# Patient Record
Sex: FEMALE | Race: WHITE | NOT HISPANIC OR LATINO | Employment: FULL TIME | URBAN - METROPOLITAN AREA
[De-identification: names, ages, dates, MRNs, and addresses within clinical notes are randomized per-mention and may not be internally consistent; named-entity substitution may affect disease eponyms.]

---

## 2022-01-10 ENCOUNTER — TELEPHONE (OUTPATIENT)
Dept: OBGYN | Facility: CLINIC | Age: 32
End: 2022-01-10
Payer: COMMERCIAL

## 2022-01-10 DIAGNOSIS — Z32.01 PREGNANCY TEST POSITIVE: Primary | ICD-10-CM

## 2022-01-10 NOTE — TELEPHONE ENCOUNTER
[facility] Call Center    Phone Message    May a detailed message be left on voicemail: yes     Reason for Call: Other: Pt scheduled first OB appt for 1/31/22.  She is wondering if she can continue to take a daily baby aspirin.  Please call Rosemarie to discuss     Action Taken: Message routed to:  [facility] day    Travel Screening: Not Applicable

## 2022-01-10 NOTE — TELEPHONE ENCOUNTER
Called and spoke with patient. States she is not currently taking aspirin, however, was prescribed in a previous pregnancy.    Spoke with PABLO Tejeda CNM. States if indicated, aspirin is started at 12 weeks.     Advised patient to not start taking aspirin at this time, will be evaluated at first visit and will be started at 12 weeks if indicated. Pt verbalized understanding and agreement, denies further questions/concerns.

## 2022-01-10 NOTE — TELEPHONE ENCOUNTER
[facility] Call Center    Phone Message    May a detailed message be left on voicemail: yes     Reason for Call: Order(s): Other:   Reason for requested: US new OBI  Date needed: 1/31/22  Provider name: Latasha Carias      Action Taken: Message routed to:  [facility] s    Travel Screening: Not Applicable

## 2022-01-31 ENCOUNTER — LAB (OUTPATIENT)
Dept: LAB | Facility: CLINIC | Age: 32
End: 2022-01-31
Attending: REGISTERED NURSE
Payer: COMMERCIAL

## 2022-01-31 ENCOUNTER — TRANSCRIBE ORDERS (OUTPATIENT)
Dept: MATERNAL FETAL MEDICINE | Facility: CLINIC | Age: 32
End: 2022-01-31
Payer: COMMERCIAL

## 2022-01-31 ENCOUNTER — ANCILLARY PROCEDURE (OUTPATIENT)
Dept: ULTRASOUND IMAGING | Facility: CLINIC | Age: 32
End: 2022-01-31
Attending: REGISTERED NURSE
Payer: COMMERCIAL

## 2022-01-31 ENCOUNTER — OFFICE VISIT (OUTPATIENT)
Dept: OBGYN | Facility: CLINIC | Age: 32
End: 2022-01-31
Attending: REGISTERED NURSE
Payer: COMMERCIAL

## 2022-01-31 VITALS
WEIGHT: 179 LBS | DIASTOLIC BLOOD PRESSURE: 85 MMHG | BODY MASS INDEX: 27.13 KG/M2 | HEIGHT: 68 IN | HEART RATE: 79 BPM | SYSTOLIC BLOOD PRESSURE: 125 MMHG

## 2022-01-31 DIAGNOSIS — Z87.59 HISTORY OF PRIOR PREGNANCY WITH IUGR NEWBORN: ICD-10-CM

## 2022-01-31 DIAGNOSIS — O10.919 CHRONIC HYPERTENSION AFFECTING PREGNANCY: ICD-10-CM

## 2022-01-31 DIAGNOSIS — Z92.29 COVID-19 VACCINE SERIES COMPLETED: ICD-10-CM

## 2022-01-31 DIAGNOSIS — O09.299 H/O POSTPARTUM HEMORRHAGE, CURRENTLY PREGNANT: ICD-10-CM

## 2022-01-31 DIAGNOSIS — Z32.01 PREGNANCY TEST POSITIVE: ICD-10-CM

## 2022-01-31 DIAGNOSIS — O09.90 HIGH-RISK PREGNANCY, UNSPECIFIED TRIMESTER: Primary | ICD-10-CM

## 2022-01-31 DIAGNOSIS — O09.299 HISTORY OF PLACENTA ACCRETA IN PRIOR PREGNANCY, CURRENTLY PREGNANT: ICD-10-CM

## 2022-01-31 DIAGNOSIS — O09.90 HIGH-RISK PREGNANCY, UNSPECIFIED TRIMESTER: ICD-10-CM

## 2022-01-31 DIAGNOSIS — O26.90 PREGNANCY RELATED CONDITION, ANTEPARTUM: Primary | ICD-10-CM

## 2022-01-31 PROBLEM — O43.219 PLACENTA ACCRETA: Status: RESOLVED | Noted: 2020-04-16 | Resolved: 2022-01-31

## 2022-01-31 PROBLEM — O43.219 PLACENTA ACCRETA: Status: ACTIVE | Noted: 2020-04-16

## 2022-01-31 LAB
ABO/RH(D): NORMAL
ANTIBODY SCREEN: NEGATIVE
DEPRECATED CALCIDIOL+CALCIFEROL SERPL-MC: 31 UG/L (ref 20–75)
ERYTHROCYTE [DISTWIDTH] IN BLOOD BY AUTOMATED COUNT: 12.9 % (ref 10–15)
HBV SURFACE AB SERPL IA-ACNC: 126 M[IU]/ML
HBV SURFACE AG SERPL QL IA: NONREACTIVE
HCT VFR BLD AUTO: 40.3 % (ref 35–47)
HCV AB SERPL QL IA: NONREACTIVE
HGB BLD-MCNC: 13.4 G/DL (ref 11.7–15.7)
HIV 1+2 AB+HIV1 P24 AG SERPL QL IA: NONREACTIVE
MCH RBC QN AUTO: 28.2 PG (ref 26.5–33)
MCHC RBC AUTO-ENTMCNC: 33.3 G/DL (ref 31.5–36.5)
MCV RBC AUTO: 85 FL (ref 78–100)
PLATELET # BLD AUTO: 225 10E3/UL (ref 150–450)
RBC # BLD AUTO: 4.76 10E6/UL (ref 3.8–5.2)
SPECIMEN EXPIRATION DATE: NORMAL
T PALLIDUM AB SER QL: NONREACTIVE
WBC # BLD AUTO: 11.3 10E3/UL (ref 4–11)

## 2022-01-31 PROCEDURE — 99207 PR PRENATAL VISIT: CPT | Performed by: REGISTERED NURSE

## 2022-01-31 PROCEDURE — 86762 RUBELLA ANTIBODY: CPT

## 2022-01-31 PROCEDURE — 86706 HEP B SURFACE ANTIBODY: CPT

## 2022-01-31 PROCEDURE — 86901 BLOOD TYPING SEROLOGIC RH(D): CPT | Performed by: REGISTERED NURSE

## 2022-01-31 PROCEDURE — 87086 URINE CULTURE/COLONY COUNT: CPT | Performed by: REGISTERED NURSE

## 2022-01-31 PROCEDURE — 87340 HEPATITIS B SURFACE AG IA: CPT

## 2022-01-31 PROCEDURE — 36415 COLL VENOUS BLD VENIPUNCTURE: CPT

## 2022-01-31 PROCEDURE — 76817 TRANSVAGINAL US OBSTETRIC: CPT

## 2022-01-31 PROCEDURE — 87389 HIV-1 AG W/HIV-1&-2 AB AG IA: CPT

## 2022-01-31 PROCEDURE — 86780 TREPONEMA PALLIDUM: CPT

## 2022-01-31 PROCEDURE — 86803 HEPATITIS C AB TEST: CPT

## 2022-01-31 PROCEDURE — 85027 COMPLETE CBC AUTOMATED: CPT

## 2022-01-31 PROCEDURE — 82306 VITAMIN D 25 HYDROXY: CPT

## 2022-01-31 PROCEDURE — G0463 HOSPITAL OUTPT CLINIC VISIT: HCPCS

## 2022-01-31 PROCEDURE — 86787 VARICELLA-ZOSTER ANTIBODY: CPT

## 2022-01-31 PROCEDURE — 76817 TRANSVAGINAL US OBSTETRIC: CPT | Mod: 26 | Performed by: OBSTETRICS & GYNECOLOGY

## 2022-01-31 RX ORDER — PRENATAL VIT/IRON FUM/FOLIC AC 27MG-0.8MG
1 TABLET ORAL DAILY
COMMUNITY
End: 2023-12-15

## 2022-01-31 RX ORDER — LOSARTAN POTASSIUM 50 MG/1
1 TABLET ORAL DAILY
COMMUNITY
Start: 2021-10-06 | End: 2022-01-31

## 2022-01-31 RX ORDER — CALCIUM CARBONATE 750 MG/1
2 TABLET, CHEWABLE ORAL
COMMUNITY
End: 2022-11-18

## 2022-01-31 RX ORDER — NIFEDIPINE 30 MG/1
30 TABLET, EXTENDED RELEASE ORAL
COMMUNITY
Start: 2021-08-18 | End: 2022-05-10

## 2022-01-31 ASSESSMENT — MIFFLIN-ST. JEOR: SCORE: 1562.5

## 2022-01-31 ASSESSMENT — PAIN SCALES - GENERAL: PAINLEVEL: NO PAIN (0)

## 2022-01-31 NOTE — LETTER
"2022       RE: Rosemarie Johnson  [address] [place] [address]     Dear Colleague,    Thank you for referring your patient, Rosemarie Johnson, to the [facility] [place] at [facility]. Please see a copy of my visit note below.    S OB Intake note  Subjective   32 year old female presents to clinic for initiation of OB care. Patient's last menstrual period was 2021.  at 7w2d by Estimated Date of Delivery: Sep 17, 2022 based on LMP c/w US. Reviewed dating ultrasound. Pregnancy is unplanned but welcomed.    Partner name - , Shun.     #1 C/S 3/28/2020 @ 30+2 weeks Male \"Mroro\"-- early delivery d/t IUGR (2%ile) w/ intermittent reverse doppler flow and vasa previa w/ focal accreta. Pregnancy was also complicated by chronic hypertension (upon arrival to L&D for delivery was found to have sustained severe range Bps: required nifedipine short and long acting and received 12h magnesium for neuro protection prior to delivery), IUGR, and 2 vessel cord. Morro is doing very well now. On the growth charts and catching up! Delivery was at [facility].       Symptoms since LMP include nausea and fatigue. Patient has tried these relief measures: small frequent meals and increased rest. Tums prn for acid reflux.     - Genetic/Infection questionnaire completed, risks include hx varicella, Japanese heritage, aunt and cousins with Factor V Leiden (she and her mother have been tested and are negative). Pt  does not have a recent known exposure to Parvo or CMV so IgG/IgM testing WILL NOT be ordered.   Recommended Flu Vaccine.  Flu Vaccine Given   Fully vaccinated against COVID-19 + booster     - Current Medications    Current Outpatient Medications   Medication Sig Dispense Refill     calcium carbonate 750 MG CHEW Take 2 tablets by mouth       NIFEdipine ER OSMOTIC (PROCARDIA XL) 30 MG 24 hr tablet Take 30 mg by mouth       Prenatal " Vit-Fe Fumarate-FA (PRENATAL MULTIVITAMIN W/IRON) 27-0.8 MG tablet Take 1 tablet by mouth daily           - Co-morbids:   Past Medical History:   Diagnosis Date     Anemia of mother during pregnancy, delivered 3/28/2020    Formatting of this note might be different from the original. Pitx3, cytotec, hemabate, txax2 Hb 10.1 (from 13.6) Hemorrhage 1760 total      delivery delivered 3/27/2020    Last Assessment & Plan:  Formatting of this note might be different from the original. Rosemarie Johnson is a 30 year old  who was admitted at 30w1d with pregnancy complicated by chronic hypertension, vasa previa, single umbilical artery, and IUGR, 2%ile. She was admitted on 3/27 from a scheduled ultrasound for prolonged monitoring due to decelerations in the heart rate and intermitted reversed do     Chronic hypertension      Delayed or secondary postpartum hemorrhage 3/28/2020    Formatting of this note might be different from the original. 1003cc after expressed clots. cytotec 800, oxytocinx3     High-risk pregnancy, unspecified trimester 2022     PCOS (polycystic ovarian syndrome)     dx age 12     Placenta accreta 2020    Formatting of this note might be different from the original. Pathology after delivery shows focal placenta accreta.  Patient currently asymptomatic     Vasa previa 2020     - Risk for GDM : No known risk factors for GDMso  WILL NOT have an early GCT and Hgb A1C    - High risk factors for Pre E-  Chronic hypertension       - Moderate risk factor for Pre E No moderate risk factors  Meets one high risk factors or one of the moderate risk facrtors  so WILL consider starting low dose aspirin (81mg) starting between 12 and 28 weeks to prevent early onset preeclampsia    - The patient  does not have a history of spontaneous  birth so  WILL NOT consider progesterone starting at 16-20 weeks and/or serial transvaginal cervical length ultrasounds from 16-24 weeks.     -The patient does  not have a history of immunosuppresion or HIV so Toxoplasma IgG/IgM WILL NOT be ordered.    -Assess risk for asymptomatic latent TB (prior infection, recent immigrant from epidemic areas, immunosuppression, living in overcrowded environment):   WILL NOT have PPD skin test or Quantiferon-TB Gold Plus blood draw. *both options valid*       PERSONAL/SOCIAL HISTORY    with partner and almost 1yo  Employment: Full time in marketing.  Job involves light activity. Working from home right now but will move to [employer] in a few weeks. 5 days/week of working out.   Her partner works as a marketing for [employer].   History of anxiety or depression  No   Additional items: None    Objective  -VS: reviewed and within normal limits   -General appearance: no acute distress, patient is comfortable   NEUROLOGICAL/PSYCHIATRIC   - Orientated x3,   -Mood and affect: : normal     Assessment/Plan  Rsoemarie was seen today for prenatal care.    Diagnoses and all orders for this visit:    High-risk pregnancy, unspecified trimester  -     Hepatitis B Surface Antibody; Future  -     Rubella Antibody IgG; Future  -     Varicella Zoster Virus Antibody IgG; Future  -     Urine Culture  -     Hepatitis C antibody; Future  -     ABO/Rh type and screen  -     CBC with platelets; Future  -     Hepatitis B surface antigen; Future  -     HIV Antigen Antibody Combo; Future  -     Treponema Abs w Reflex to RPR and Titer; Future  -     25- OH-Vitamin D; Future  -     Mat Fetal Med Ctr Referral - Pregnancy; Future    Chronic hypertension affecting pregnancy    History of placenta accreta in prior pregnancy, currently pregnant    Vasa previa, single or unspecified fetus    H/O postpartum hemorrhage, currently pregnant    COVID-19 vaccine series completed    History of prior pregnancy with IUGR         32 year old  7w2d weeks of pregnancy with CRISTHIAN of Sep 17, 2022 by LMP of Patient's last menstrual period was 2021.. Ultrasound  confirms.   Outpatient Encounter Medications as of 1/31/2022   Medication Sig Dispense Refill     calcium carbonate 750 MG CHEW Take 2 tablets by mouth       NIFEdipine ER OSMOTIC (PROCARDIA XL) 30 MG 24 hr tablet Take 30 mg by mouth       Prenatal Vit-Fe Fumarate-FA (PRENATAL MULTIVITAMIN W/IRON) 27-0.8 MG tablet Take 1 tablet by mouth daily       [DISCONTINUED] losartan (COZAAR) 50 MG tablet Take 1 tablet by mouth daily (Patient not taking: Reported on 1/31/2022)       No facility-administered encounter medications on file as of 1/31/2022.      Orders Placed This Encounter   Procedures     Hepatitis B Surface Antibody     Rubella Antibody IgG     Varicella Zoster Virus Antibody IgG     Hepatitis C antibody     CBC with platelets     Hepatitis B surface antigen     HIV Antigen Antibody Combo     Treponema Abs w Reflex to RPR and Titer     25- OH-Vitamin D     Mat Fetal Med Ctr Referral - Pregnancy     Adult Type and Screen       Orders Placed This Encounter   Procedures     Hepatitis B Surface Antibody     Rubella Antibody IgG     Varicella Zoster Virus Antibody IgG     Hepatitis C antibody     CBC with platelets     Hepatitis B surface antigen     HIV Antigen Antibody Combo     Treponema Abs w Reflex to RPR and Titer     25- OH-Vitamin D     Mat Fetal Med Ctr Referral - Pregnancy     Adult Type and Screen     ABO/Rh type and screen       - Oriented to Practice, types of care, and how to reach a provider.  Pt prefers MD team  - Patient received 1st trimester new OB education packet complete with aide of The Expectant Family booklet including information on genetic screening test options.  - Patient desires 1st trimester screening and desires level II ultrasound which were ordered.  - Educational handout on the prevention of infections diseases during pregnancy provided.  - Patient was encouraged to start prenatal vitamins as tolerated.    - Patient was sent to lab for routine OB labs including varicella.   -  Reviewed recommendation for 17P, pt not a candidate due to PTB related to placental abnormality and fetal concern.      - Reviewed risk for diabetes in pregnancy, pt not a candidate for early screening. Plan routine screening 24-28 weeks  - Reviewed recommendation for low dose aspirin daily to prevent pre eclampsia, pt agrees, follow up at NOB visit.   - Pregnancy concerns to be addressed by provider at new OB exam include:     #previous  C/S x1 IUGR w/intermittent reverse doppler flow and vasa previa  #chronic hypertension: planning to start ASA @ 12 weeks     Pt to RTO for NOB visit in 3 weeks and prn if questions or concerns    LASHAY Pedraza CNM            Again, thank you for allowing me to participate in the care of your patient.      Sincerely,    LASHAY Pedraza CNM

## 2022-01-31 NOTE — LETTER
Date:February 1, 2022      Patient was self referred, no letter generated. Do not send.        [facility] Health Information

## 2022-01-31 NOTE — NURSING NOTE
Chief Complaint   Patient presents with     Prenatal Care     OB intake CRISTHIAN 9/17/2022   Charito Donaldson LPN

## 2022-01-31 NOTE — PROGRESS NOTES
"[facility] OB Intake note  Subjective   32 year old female presents to clinic for initiation of OB care. Patient's last menstrual period was 2021.  at 7w2d by Estimated Date of Delivery: Sep 17, 2022 based on LMP c/w US. Reviewed dating ultrasound. Pregnancy is unplanned but welcomed.    Partner name - , Shun.     #1 C/S 3/28/2020 @ 30+2 weeks Male \"Morro\"-- early delivery d/t IUGR (2%ile) w/ intermittent reverse doppler flow and vasa previa w/ focal accreta. Pregnancy was also complicated by chronic hypertension (upon arrival to L&D for delivery was found to have sustained severe range Bps: required nifedipine short and long acting and received 12h magnesium for neuro protection prior to delivery), IUGR, and 2 vessel cord. Morro is doing very well now. On the growth charts and catching up! Delivery was at [facility].       Symptoms since LMP include nausea and fatigue. Patient has tried these relief measures: small frequent meals and increased rest. Tums prn for acid reflux.     - Genetic/Infection questionnaire completed, risks include hx varicella, Bulgarian heritage, aunt and cousins with Factor V Leiden (she and her mother have been tested and are negative). Pt  does not have a recent known exposure to Parvo or CMV so IgG/IgM testing WILL NOT be ordered.   Recommended Flu Vaccine.  Flu Vaccine Given   Fully vaccinated against COVID-19 + booster     - Current Medications    Current Outpatient Medications   Medication Sig Dispense Refill     calcium carbonate 750 MG CHEW Take 2 tablets by mouth       NIFEdipine ER OSMOTIC (PROCARDIA XL) 30 MG 24 hr tablet Take 30 mg by mouth       Prenatal Vit-Fe Fumarate-FA (PRENATAL MULTIVITAMIN W/IRON) 27-0.8 MG tablet Take 1 tablet by mouth daily           - Co-morbids:   Past Medical History:   Diagnosis Date     Anemia of mother during pregnancy, delivered 3/28/2020    Formatting of this note might be different from the original. Pitx3, cytotec, hemabate, txax2 " Hb 10.1 (from 13.6) Hemorrhage 1760 total      delivery delivered 3/27/2020    Last Assessment & Plan:  Formatting of this note might be different from the original. Rosemarie Johnson is a 30 year old  who was admitted at 30w1d with pregnancy complicated by chronic hypertension, vasa previa, single umbilical artery, and IUGR, 2%ile. She was admitted on 3/27 from a scheduled ultrasound for prolonged monitoring due to decelerations in the heart rate and intermitted reversed do     Chronic hypertension      Delayed or secondary postpartum hemorrhage 3/28/2020    Formatting of this note might be different from the original. 1003cc after expressed clots. cytotec 800, oxytocinx3     High-risk pregnancy, unspecified trimester 2022     PCOS (polycystic ovarian syndrome)     dx age 12     Placenta accreta 2020    Formatting of this note might be different from the original. Pathology after delivery shows focal placenta accreta.  Patient currently asymptomatic     Vasa previa 2020     - Risk for GDM : No known risk factors for GDMso  WILL NOT have an early GCT and Hgb A1C    - High risk factors for Pre E-  Chronic hypertension       - Moderate risk factor for Pre E No moderate risk factors  Meets one high risk factors or one of the moderate risk facrtors  so WILL consider starting low dose aspirin (81mg) starting between 12 and 28 weeks to prevent early onset preeclampsia    - The patient  does not have a history of spontaneous  birth so  WILL NOT consider progesterone starting at 16-20 weeks and/or serial transvaginal cervical length ultrasounds from 16-24 weeks.     -The patient does not have a history of immunosuppresion or HIV so Toxoplasma IgG/IgM WILL NOT be ordered.    -Assess risk for asymptomatic latent TB (prior infection, recent immigrant from epidemic areas, immunosuppression, living in overcrowded environment):   WILL NOT have PPD skin test or Quantiferon-TB Gold Plus blood draw.  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 5 days/week of working out.   Her partner works as a marketing for [employer].   History of anxiety or depression  No   Additional items: None    Objective  -VS: reviewed and within normal limits   -General appearance: no acute distress, patient is comfortable   NEUROLOGICAL/PSYCHIATRIC   - Orientated x3,   -Mood and affect: : normal     Assessment/Plan  Rosemarie was seen today for prenatal care.    Diagnoses and all orders for this visit:    High-risk pregnancy, unspecified trimester  -     Hepatitis B Surface Antibody; Future  -     Rubella Antibody IgG; Future  -     Varicella Zoster Virus Antibody IgG; Future  -     Urine Culture  -     Hepatitis C antibody; Future  -     ABO/Rh type and screen  -     CBC with platelets; Future  -     Hepatitis B surface antigen; Future  -     HIV Antigen Antibody Combo; Future  -     Treponema Abs w Reflex to RPR and Titer; Future  -     25- OH-Vitamin D; Future  -     Mat Fetal Med Ctr Referral - Pregnancy; Future    Chronic hypertension affecting pregnancy    History of placenta accreta in prior pregnancy, currently pregnant    Vasa previa, single or unspecified fetus    H/O postpartum hemorrhage, currently pregnant    COVID-19 vaccine series completed    History of prior pregnancy with IUGR         32 year old  7w2d weeks of pregnancy with CRISTHIAN of Sep 17, 2022 by LMP of Patient's last menstrual period was 2021.. Ultrasound confirms.   Outpatient Encounter Medications as of 2022   Medication Sig Dispense Refill     calcium carbonate 750 MG CHEW Take 2 tablets by mouth       NIFEdipine ER OSMOTIC (PROCARDIA XL) 30 MG 24 hr tablet Take 30 mg by mouth       Prenatal Vit-Fe Fumarate-FA (PRENATAL MULTIVITAMIN W/IRON) 27-0.8 MG tablet Take  1 tablet by mouth daily       [DISCONTINUED] losartan (COZAAR) 50 MG tablet Take 1 tablet by mouth daily (Patient not taking: Reported on 1/31/2022)       No facility-administered encounter medications on file as of 1/31/2022.      Orders Placed This Encounter   Procedures     Hepatitis B Surface Antibody     Rubella Antibody IgG     Varicella Zoster Virus Antibody IgG     Hepatitis C antibody     CBC with platelets     Hepatitis B surface antigen     HIV Antigen Antibody Combo     Treponema Abs w Reflex to RPR and Titer     25- OH-Vitamin D     Mat Fetal Med Ctr Referral - Pregnancy     Adult Type and Screen       Orders Placed This Encounter   Procedures     Hepatitis B Surface Antibody     Rubella Antibody IgG     Varicella Zoster Virus Antibody IgG     Hepatitis C antibody     CBC with platelets     Hepatitis B surface antigen     HIV Antigen Antibody Combo     Treponema Abs w Reflex to RPR and Titer     25- OH-Vitamin D     Mat Fetal Med Ctr Referral - Pregnancy     Adult Type and Screen     ABO/Rh type and screen       - Oriented to Practice, types of care, and how to reach a provider.  Pt prefers MD team  - Patient received 1st trimester new OB education packet complete with aide of The Expectant Family booklet including information on genetic screening test options.  - Patient desires 1st trimester screening and desires level II ultrasound which were ordered.  - Educational handout on the prevention of infections diseases during pregnancy provided.  - Patient was encouraged to start prenatal vitamins as tolerated.    - Patient was sent to lab for routine OB labs including varicella.   - Reviewed recommendation for 17P, pt not a candidate due to PTB related to placental abnormality and fetal concern.      - Reviewed risk for diabetes in pregnancy, pt not a candidate for early screening. Plan routine screening 24-28 weeks  - Reviewed recommendation for low dose aspirin daily to prevent pre eclampsia, pt  agrees, follow up at NOB visit.   - Pregnancy concerns to be addressed by provider at new OB exam include:     #previous  C/S x1 IUGR w/intermittent reverse doppler flow and vasa previa  #chronic hypertension: planning to start ASA @ 12 weeks     Pt to RTO for NOB visit in 3 weeks and prn if questions or concerns    LASHAY PedrazaM

## 2022-02-01 LAB
BACTERIA UR CULT: NORMAL
RUBV IGG SERPL QL IA: 2.38 INDEX
RUBV IGG SERPL QL IA: POSITIVE
VZV IGG SER QL IA: 1033 INDEX
VZV IGG SER QL IA: POSITIVE

## 2022-02-20 ASSESSMENT — ANXIETY QUESTIONNAIRES
7. FEELING AFRAID AS IF SOMETHING AWFUL MIGHT HAPPEN: NOT AT ALL
4. TROUBLE RELAXING: SEVERAL DAYS
2. NOT BEING ABLE TO STOP OR CONTROL WORRYING: NOT AT ALL
7. FEELING AFRAID AS IF SOMETHING AWFUL MIGHT HAPPEN: NOT AT ALL
5. BEING SO RESTLESS THAT IT IS HARD TO SIT STILL: NOT AT ALL
1. FEELING NERVOUS, ANXIOUS, OR ON EDGE: SEVERAL DAYS
GAD7 TOTAL SCORE: 2
6. BECOMING EASILY ANNOYED OR IRRITABLE: NOT AT ALL
3. WORRYING TOO MUCH ABOUT DIFFERENT THINGS: NOT AT ALL
GAD7 TOTAL SCORE: 2

## 2022-02-21 ENCOUNTER — OFFICE VISIT (OUTPATIENT)
Dept: OBGYN | Facility: CLINIC | Age: 32
End: 2022-02-21
Attending: ADVANCED PRACTICE MIDWIFE
Payer: COMMERCIAL

## 2022-02-21 VITALS
DIASTOLIC BLOOD PRESSURE: 98 MMHG | SYSTOLIC BLOOD PRESSURE: 147 MMHG | HEART RATE: 70 BPM | BODY MASS INDEX: 27.14 KG/M2 | HEIGHT: 68 IN | WEIGHT: 179.1 LBS

## 2022-02-21 DIAGNOSIS — Z87.59 HISTORY OF PRIOR PREGNANCY WITH IUGR NEWBORN: ICD-10-CM

## 2022-02-21 DIAGNOSIS — O09.90 HIGH-RISK PREGNANCY, UNSPECIFIED TRIMESTER: ICD-10-CM

## 2022-02-21 DIAGNOSIS — O10.919 CHRONIC HYPERTENSION AFFECTING PREGNANCY: ICD-10-CM

## 2022-02-21 DIAGNOSIS — Z92.29 COVID-19 VACCINE SERIES COMPLETED: ICD-10-CM

## 2022-02-21 DIAGNOSIS — O09.299 HISTORY OF PLACENTA ACCRETA IN PRIOR PREGNANCY, CURRENTLY PREGNANT: ICD-10-CM

## 2022-02-21 DIAGNOSIS — O09.299 H/O POSTPARTUM HEMORRHAGE, CURRENTLY PREGNANT: ICD-10-CM

## 2022-02-21 PROCEDURE — G0463 HOSPITAL OUTPT CLINIC VISIT: HCPCS

## 2022-02-21 PROCEDURE — 99207 PR PRENATAL VISIT: CPT | Performed by: ADVANCED PRACTICE MIDWIFE

## 2022-02-21 PROCEDURE — 87591 N.GONORRHOEAE DNA AMP PROB: CPT | Performed by: ADVANCED PRACTICE MIDWIFE

## 2022-02-21 PROCEDURE — 87491 CHLMYD TRACH DNA AMP PROBE: CPT | Performed by: ADVANCED PRACTICE MIDWIFE

## 2022-02-21 ASSESSMENT — ANXIETY QUESTIONNAIRES: GAD7 TOTAL SCORE: 2

## 2022-02-21 NOTE — LETTER
2022       RE: Rosemarie Johnson  [address] [place] [address]     Dear Colleague,    Thank you for referring your patient, Rosemarie Johnson, to the [facility] [place] at [facility]. Please see a copy of my visit note below.    SUBJECTIVE:   Rosemarie is a 32 year old female who presents to clinic for a new OB visit.   at 11w1d with Estimated Date of Delivery: Sep 17, 2022 based on US confirms. Feels well. Has  started PNV.     She has not had bleeding since her LMP.   She has had mild nausea. Weight loss has not occurred.   This was a planned pregnancy.   Partner is involved,     OTHER CONCERNS: elevated BPs today. States all nml at home, does endorse getting anxious about coming to clinic, forgot med yesterday but did take this morning  Has follow up appts scheduled with MFM already    ===========================================   ROS: 10 point ROS neg other than the symptoms noted above in the HPI.      PSYCHIATRIC:  Denies mood changes.      PHQ-9 score:  No flowsheet data found.        RITO-7 SCORE 2022   Total Score 2 (minimal anxiety)   Total Score 2         Past History:  Her past medical history   Past Medical History:   Diagnosis Date     Anemia of mother during pregnancy, delivered 3/28/2020    Formatting of this note might be different from the original. Pitx3, cytotec, hemabate, txax2 Hb 10.1 (from 13.6) Hemorrhage 1760 total      delivery delivered 3/27/2020    Last Assessment & Plan:  Formatting of this note might be different from the original. Rosemarie Johnson is a 30 year old  who was admitted at 30w1d with pregnancy complicated by chronic hypertension, vasa previa, single umbilical artery, and IUGR, 2%ile. She was admitted on 3/27 from a scheduled ultrasound for prolonged monitoring due to decelerations in the heart rate and intermitted reversed do     Chronic hypertension      Delayed or  secondary postpartum hemorrhage 3/28/2020    Formatting of this note might be different from the original. 1003cc after expressed clots. cytotec 800, oxytocinx3     High-risk pregnancy, unspecified trimester 1/31/2022     PCOS (polycystic ovarian syndrome)     dx age 12     Placenta accreta 4/16/2020    Formatting of this note might be different from the original. Pathology after delivery shows focal placenta accreta.  Patient currently asymptomatic     Vasa previa 1/30/2020   .   She has a history of  pre-term delivery at 30 weeks  Since her last LMP she denies use of alcohol, tobacco and street drugs.  HISTORY:  Family History   Problem Relation Age of Onset     Hypertension Mother      Hypertension Father      Sleep Apnea Father      Hypertension Maternal Grandmother      Arthritis Maternal Grandmother      Parkinsonism Maternal Grandfather      Heart Disease Maternal Grandfather      Leukemia Maternal Grandfather      Heart Surgery Paternal Grandfather      Social History     Socioeconomic History     Marital status:      Spouse name: Shun     Number of children: 1     Years of education: None     Highest education level: None   Occupational History     Occupation: [employer]-full time   Tobacco Use     Smoking status: Never Smoker     Smokeless tobacco: Never Used   Substance and Sexual Activity     Alcohol use: Not Currently     Drug use: Never     Sexual activity: Yes     Partners: Male     Birth control/protection: None   Other Topics Concern     None   Social History Narrative     None     Social Determinants of Health     Financial Resource Strain: Not on file   Food Insecurity: Not on file   Transportation Needs: Not on file   Physical Activity: Not on file   Stress: Not on file   Social Connections: Not on file   Intimate Partner Violence: Not on file   Housing Stability: Not on file     Current Outpatient Medications   Medication Sig     calcium carbonate 750 MG CHEW Take 2 tablets by mouth      NIFEdipine ER OSMOTIC (PROCARDIA XL) 30 MG 24 hr tablet Take 30 mg by mouth     Prenatal Vit-Fe Fumarate-FA (PRENATAL MULTIVITAMIN W/IRON) 27-0.8 MG tablet Take 1 tablet by mouth daily     No current facility-administered medications for this visit.     No Known Allergies    ============================================  MEDICAL HISTORY  Past Medical History:   Diagnosis Date     Anemia of mother during pregnancy, delivered 3/28/2020    Formatting of this note might be different from the original. Pitx3, cytotec, hemabate, txax2 Hb 10.1 (from 13.6) Hemorrhage 1760 total      delivery delivered 3/27/2020    Last Assessment & Plan:  Formatting of this note might be different from the original. Rosemarie Johnson is a 30 year old  who was admitted at 30w1d with pregnancy complicated by chronic hypertension, vasa previa, single umbilical artery, and IUGR, 2%ile. She was admitted on 3/27 from a scheduled ultrasound for prolonged monitoring due to decelerations in the heart rate and intermitted reversed do     Chronic hypertension      Delayed or secondary postpartum hemorrhage 3/28/2020    Formatting of this note might be different from the original. 1003cc after expressed clots. cytotec 800, oxytocinx3     High-risk pregnancy, unspecified trimester 2022     PCOS (polycystic ovarian syndrome)     dx age 12     Placenta accreta 2020    Formatting of this note might be different from the original. Pathology after delivery shows focal placenta accreta.  Patient currently asymptomatic     Vasa previa 2020     Past Surgical History:   Procedure Laterality Date     C ORAL SURGERY SINGLE TOOTH      removal of impacted tooth     WISDOM TOOTH EXTRACTION         OB History    Para Term  AB Living   2 1 0 1 0 1   SAB IAB Ectopic Multiple Live Births   0 0 0 0 1      # Outcome Date GA Lbr Slava/2nd Weight Sex Delivery Anes PTL Lv   2 Current            1  20 30w2d  0.86 kg (1 lb 14.3 oz)  "M IAB  N RUI         GYN History-  Abnormal Pap Smears                        Cervical procedures: no                        History of STI: no    I personally reviewed the past social/family/medical and surgical history on the date of service.   I reviewed lab work done at Intake visit with patient.    EXAM:  BP (!) 147/98   Pulse 70   Ht 1.715 m (5' 7.5\")   Wt 81.2 kg (179 lb 1.6 oz)   LMP 2021   BMI 27.64 kg/m     EXAM:  GENERAL:  Pleasant pregnant female, alert, cooperative  and well groomed.  SKIN:  Warm and dry, without lesions or rashes  HEAD: Symmetrical features.  MOUTH:  Buccal mucosa pink, moist without lesions.  Teeth in unable to assess repair.    NECK:  Thyroid without enlargement and nodules.  Lymph nodes not palpable.   LUNGS:  Clear to auscultation.  BREAST:    No dominant, fixed or suspicious masses are noted.  No skin or nipple changes or axillary nodes.   Nipples everted.      HEART:  RRR without murmur.  ABDOMEN: Soft without masses , tenderness or organomegaly.  No CVA tenderness.  Uterus not palpable at size equal to dates.  Well healed scar from  section. Fetal heart tones present.  MUSCULOSKELETAL:  Full range of motion  EXTREMITIES:  No edema. No significant varicosities.   PELVIC EXAM: deferred    GC/CHLAMYDIA CULTURE OBTAINED:YES    No results found for: PAP     Recommended Flu Vaccine.  Patient already received Flu Vaccine      ASSESSMENT:  32 year old , 10w2d weeks of pregnancy with CRISTHIAN of Sep 17, 2022 by US confirms  Intrauterine pregnancy 10 w 2d size  consistent with dates  Genetic Screening: First Trimester Screen    ICD-10-CM    1. History of placenta accreta in prior pregnancy, currently pregnant  O09.299    2. Chronic hypertension affecting pregnancy  O10.919    3. H/O postpartum hemorrhage, currently pregnant  O09.299    4. COVID-19 vaccine series completed  Z92.29    5. History of prior pregnancy with IUGR   Z87.59    6. High-risk pregnancy, " unspecified trimester  O09.90 Chlamydia trachomatis PCR     Neisseria gonorrhoeae PCR       PLAN:  - Reviewed use of triage nurse line and contacting the on-call provider after hours for an urgent need such as fever, vagina bleeding, bladder or vaginal infection, rupture of membranes,  or term labor.    - Reviewed best evidence for: weight gain for her weight and height for pregnancy:  Based on pre-pregnancy Body mass index is 27.64 kg/m . RECOMMENDED WEIGHT GAIN: 25-35 lbs.        - Reviewed healthy diet and foods to avoid, exercise and activity during pregnancy; avoiding exposure to toxoplasmosis; and maintenance of a generally healthy lifestyle.   - Discussed the harms, benefits, side effects and alternative therapies for current prescribed and OTC medications.    - Reviewed high risk for gestational diabetes d/t No known risk factors for GDM, IS NOT agreeable to early 1 hour today.  - Reviewed high risk for pre term labor, IS NOTagreeable  to 17P.  Cervical length screening IS NOT indicated.  - Reviewed High risk for Pre Eclampsia d/t Chronic hypertension  or meets two or more of the moderate risk factors including No moderate risk factors and ISagreeable to starting 81mg aspirin daily after 12 weeks .    - All pt's questions discussed and answered.  Pt verbalized understanding of and agreement to plan of care.   -will ref to chandrika for eval of HTN and med use  - Continue scheduled prenatal care and prn if questions or concerns    LASHAY Mackey CNM          Answers for HPI/ROS submitted by the patient on 2022  RITO 7 TOTAL SCORE: 2  General Symptoms: No  Skin Symptoms: No  HENT Symptoms: No  EYE SYMPTOMS: No  HEART SYMPTOMS: No  LUNG SYMPTOMS: No  INTESTINAL SYMPTOMS: No  URINARY SYMPTOMS: No  GYNECOLOGIC SYMPTOMS: No  BREAST SYMPTOMS: No  SKELETAL SYMPTOMS: No  BLOOD SYMPTOMS: No  NERVOUS SYSTEM SYMPTOMS: No  MENTAL HEALTH SYMPTOMS: No

## 2022-02-22 LAB
C TRACH DNA SPEC QL NAA+PROBE: NEGATIVE
N GONORRHOEA DNA SPEC QL NAA+PROBE: NEGATIVE

## 2022-02-25 ENCOUNTER — VIRTUAL VISIT (OUTPATIENT)
Dept: INTERNAL MEDICINE | Facility: CLINIC | Age: 32
End: 2022-02-25
Attending: INTERNAL MEDICINE
Payer: COMMERCIAL

## 2022-02-25 DIAGNOSIS — O10.919 CHRONIC HYPERTENSION AFFECTING PREGNANCY: Primary | ICD-10-CM

## 2022-02-25 PROCEDURE — 99204 OFFICE O/P NEW MOD 45 MIN: CPT | Mod: 95 | Performed by: INTERNAL MEDICINE

## 2022-02-25 NOTE — PROGRESS NOTES
"Rosemarie is a 32 year old who is being evaluated via a billable telephone visit.      What phone number would you like to be contacted at? [phone]  How would you like to obtain your AVS? Elzaharsrinath    Assessment & Plan     Chronic hypertension affecting pregnancy  Patient reports that her blood pressure is currently within acceptable range.  Advised on risks associated with chronic hypertension in pregnancy.  Patient was also advised on signs and symptoms of preeclampsia.  Reviewed preventive steps, such as start of aspirin therapy after 12 weeks gestation.  Patient was advised to continue with current medical therapy with nifedipine without any changes.  Recommend close monitoring.  Patient will come in for blood pressure check at her next prenatal visit.        I spent a total of 45 minutes on the day of the visit.   Time spent doing chart review, history and exam, documentation and further activities per the note       BMI:   Estimated body mass index is 27.64 kg/m  as calculated from the following:    Height as of 2/21/22: 1.715 m (5' 7.5\").    Weight as of 2/21/22: 81.2 kg (179 lb 1.6 oz).           No follow-ups on file.    Marti Russo MD  [facility] [place]    Martha Houston is a 32 year old who presents for the following health issues     HPI     Patient was advised to follow up on elevated blood pressure. She was diagnosed with chronic hypertension in her first pregnancy. She was started on nifedipine and was closely monitored. She was delivered at 30 weeks. She was nifedipine for some time after her delivery and was transitioned to losartan. She has switched to nifedipine again when she decided to have another child. She reports that her blood pressure has been under good control for the most part, however, was she forgot to take the medication one day. She reports that her blood pressure was elevated last Monday, however, she had a stressful weekend. She states that " she has been taking her blood pressure at home. She states that her blood pressure at home has been in 120-130/80's.      Review of Systems   Constitutional, HEENT, cardiovascular, pulmonary, GI, , musculoskeletal, neuro, skin, endocrine and psych systems are negative, except as otherwise noted.      Objective           Vitals:  No vitals were obtained today due to virtual visit.    Physical Exam   healthy, alert and no distress  PSYCH: Alert and oriented times 3; coherent speech, normal   rate and volume, able to articulate logical thoughts, able   to abstract reason, no tangential thoughts, no hallucinations   or delusions  Her affect is normal  RESP: No cough, no audible wheezing, able to talk in full sentences  Remainder of exam unable to be completed due to telephone visits                Phone call duration: 40 minutes

## 2022-02-25 NOTE — LETTER
"2/25/2022       RE: Rosemarie Johnson  [address] [place] [address]     Dear Colleague,    Thank you for referring your patient, Rosemarie Johnson, to the [facility] at [facility]. Please see a copy of my visit note below.    Rosemarie is a 32 year old who is being evaluated via a billable telephone visit.      What phone number would you like to be contacted at? [phone]  How would you like to obtain your AVS? MyChart    Assessment & Plan     Chronic hypertension affecting pregnancy  Patient reports that her blood pressure is currently within acceptable range.  Advised on risks associated with chronic hypertension in pregnancy.  Patient was also advised on signs and symptoms of preeclampsia.  Reviewed preventive steps, such as start of aspirin therapy after 12 weeks gestation.  Patient was advised to continue with current medical therapy with nifedipine without any changes.  Recommend close monitoring.  Patient will come in for blood pressure check at her next prenatal visit.        I spent a total of 45 minutes on the day of the visit.   Time spent doing chart review, history and exam, documentation and further activities per the note       BMI:   Estimated body mass index is 27.64 kg/m  as calculated from the following:    Height as of 2/21/22: 1.715 m (5' 7.5\").    Weight as of 2/21/22: 81.2 kg (179 lb 1.6 oz).           No follow-ups on file.    Marti Russo MD  [facility]    Martha Houston is a 32 year old who presents for the following health issues     HPI     Patient was advised to follow up on elevated blood pressure. She was diagnosed with chronic hypertension in her first pregnancy. She was started on nifedipine and was closely monitored. She was delivered at 30 weeks. She was nifedipine for some time after her delivery and was transitioned to losartan. She has switched to " nifedipine again when she decided to have another child. She reports that her blood pressure has been under good control for the most part, however, was she forgot to take the medication one day. She reports that her blood pressure was elevated last Monday, however, she had a stressful weekend. She states that she has been taking her blood pressure at home. She states that her blood pressure at home has been in 120-130/80's.      Review of Systems   Constitutional, HEENT, cardiovascular, pulmonary, GI, , musculoskeletal, neuro, skin, endocrine and psych systems are negative, except as otherwise noted.      Objective           Vitals:  No vitals were obtained today due to virtual visit.    Physical Exam   healthy, alert and no distress  PSYCH: Alert and oriented times 3; coherent speech, normal   rate and volume, able to articulate logical thoughts, able   to abstract reason, no tangential thoughts, no hallucinations   or delusions  Her affect is normal  RESP: No cough, no audible wheezing, able to talk in full sentences  Remainder of exam unable to be completed due to telephone visits                Phone call duration: 40 minutes      Again, thank you for allowing me to participate in the care of your patient.      Sincerely,    Marti Russo MD

## 2022-02-25 NOTE — LETTER
Date:February 28, 2022      Provider requested that no letter be sent. Do not send.       [facility]

## 2022-02-27 NOTE — PROGRESS NOTES
SUBJECTIVE:   Rosemarie is a 32 year old female who presents to clinic for a new OB visit.   at 11w1d with Estimated Date of Delivery: Sep 17, 2022 based on US confirms. Feels well. Has  started PNV.     She has not had bleeding since her LMP.   She has had mild nausea. Weight loss has not occurred.   This was a planned pregnancy.   Partner is involved,     OTHER CONCERNS: elevated BPs today. States all nml at home, does endorse getting anxious about coming to clinic, forgot med yesterday but did take this morning  Has follow up appts scheduled with MFM already    ===========================================   ROS: 10 point ROS neg other than the symptoms noted above in the HPI.      PSYCHIATRIC:  Denies mood changes.      PHQ-9 score:  No flowsheet data found.        RITO-7 SCORE 2022   Total Score 2 (minimal anxiety)   Total Score 2         Past History:  Her past medical history   Past Medical History:   Diagnosis Date     Anemia of mother during pregnancy, delivered 3/28/2020    Formatting of this note might be different from the original. Pitx3, cytotec, hemabate, txax2 Hb 10.1 (from 13.6) Hemorrhage 1760 total      delivery delivered 3/27/2020    Last Assessment & Plan:  Formatting of this note might be different from the original. Rosemarie Johnson is a 30 year old  who was admitted at 30w1d with pregnancy complicated by chronic hypertension, vasa previa, single umbilical artery, and IUGR, 2%ile. She was admitted on 3/27 from a scheduled ultrasound for prolonged monitoring due to decelerations in the heart rate and intermitted reversed do     Chronic hypertension      Delayed or secondary postpartum hemorrhage 3/28/2020    Formatting of this note might be different from the original. 1003cc after expressed clots. cytotec 800, oxytocinx3     High-risk pregnancy, unspecified trimester 2022     PCOS (polycystic ovarian syndrome)     dx age 12     Placenta accreta 2020    Formatting of  this note might be different from the original. Pathology after delivery shows focal placenta accreta.  Patient currently asymptomatic     Vasa previa 1/30/2020   .   She has a history of  pre-term delivery at 30 weeks  Since her last LMP she denies use of alcohol, tobacco and street drugs.  HISTORY:  Family History   Problem Relation Age of Onset     Hypertension Mother      Hypertension Father      Sleep Apnea Father      Hypertension Maternal Grandmother      Arthritis Maternal Grandmother      Parkinsonism Maternal Grandfather      Heart Disease Maternal Grandfather      Leukemia Maternal Grandfather      Heart Surgery Paternal Grandfather      Social History     Socioeconomic History     Marital status:      Spouse name: Shun     Number of children: 1     Years of education: None     Highest education level: None   Occupational History     Occupation: [employer]-full time   Tobacco Use     Smoking status: Never Smoker     Smokeless tobacco: Never Used   Substance and Sexual Activity     Alcohol use: Not Currently     Drug use: Never     Sexual activity: Yes     Partners: Male     Birth control/protection: None   Other Topics Concern     None   Social History Narrative     None     Social Determinants of Health     Financial Resource Strain: Not on file   Food Insecurity: Not on file   Transportation Needs: Not on file   Physical Activity: Not on file   Stress: Not on file   Social Connections: Not on file   Intimate Partner Violence: Not on file   Housing Stability: Not on file     Current Outpatient Medications   Medication Sig     calcium carbonate 750 MG CHEW Take 2 tablets by mouth     NIFEdipine ER OSMOTIC (PROCARDIA XL) 30 MG 24 hr tablet Take 30 mg by mouth     Prenatal Vit-Fe Fumarate-FA (PRENATAL MULTIVITAMIN W/IRON) 27-0.8 MG tablet Take 1 tablet by mouth daily     No current facility-administered medications for this visit.     No Known  Allergies    ============================================  MEDICAL HISTORY  Past Medical History:   Diagnosis Date     Anemia of mother during pregnancy, delivered 3/28/2020    Formatting of this note might be different from the original. Pitx3, cytotec, hemabate, txax2 Hb 10.1 (from 13.6) Hemorrhage 1760 total      delivery delivered 3/27/2020    Last Assessment & Plan:  Formatting of this note might be different from the original. Rosemarie Johnson is a 30 year old  who was admitted at 30w1d with pregnancy complicated by chronic hypertension, vasa previa, single umbilical artery, and IUGR, 2%ile. She was admitted on 3/27 from a scheduled ultrasound for prolonged monitoring due to decelerations in the heart rate and intermitted reversed do     Chronic hypertension      Delayed or secondary postpartum hemorrhage 3/28/2020    Formatting of this note might be different from the original. 1003cc after expressed clots. cytotec 800, oxytocinx3     High-risk pregnancy, unspecified trimester 2022     PCOS (polycystic ovarian syndrome)     dx age 12     Placenta accreta 2020    Formatting of this note might be different from the original. Pathology after delivery shows focal placenta accreta.  Patient currently asymptomatic     Vasa previa 2020     Past Surgical History:   Procedure Laterality Date     C ORAL SURGERY SINGLE TOOTH      removal of impacted tooth     WISDOM TOOTH EXTRACTION         OB History    Para Term  AB Living   2 1 0 1 0 1   SAB IAB Ectopic Multiple Live Births   0 0 0 0 1      # Outcome Date GA Lbr Slava/2nd Weight Sex Delivery Anes PTL Lv   2 Current            1  20 30w2d  0.86 kg (1 lb 14.3 oz) M IAB  N RUI         GYN History-  Abnormal Pap Smears                        Cervical procedures: no                        History of STI: no    I personally reviewed the past social/family/medical and surgical history on the date of service.   I reviewed lab  "work done at Intake visit with patient.    EXAM:  BP (!) 147/98   Pulse 70   Ht 1.715 m (5' 7.5\")   Wt 81.2 kg (179 lb 1.6 oz)   LMP 2021   BMI 27.64 kg/m     EXAM:  GENERAL:  Pleasant pregnant female, alert, cooperative  and well groomed.  SKIN:  Warm and dry, without lesions or rashes  HEAD: Symmetrical features.  MOUTH:  Buccal mucosa pink, moist without lesions.  Teeth in unable to assess repair.    NECK:  Thyroid without enlargement and nodules.  Lymph nodes not palpable.   LUNGS:  Clear to auscultation.  BREAST:    No dominant, fixed or suspicious masses are noted.  No skin or nipple changes or axillary nodes.   Nipples everted.      HEART:  RRR without murmur.  ABDOMEN: Soft without masses , tenderness or organomegaly.  No CVA tenderness.  Uterus not palpable at size equal to dates.  Well healed scar from  section. Fetal heart tones present.  MUSCULOSKELETAL:  Full range of motion  EXTREMITIES:  No edema. No significant varicosities.   PELVIC EXAM: deferred    GC/CHLAMYDIA CULTURE OBTAINED:YES    No results found for: PAP     Recommended Flu Vaccine.  Patient already received Flu Vaccine      ASSESSMENT:  32 year old , 10w2d weeks of pregnancy with CRISTHIAN of Sep 17, 2022 by US confirms  Intrauterine pregnancy 10 w 2d size  consistent with dates  Genetic Screening: First Trimester Screen    ICD-10-CM    1. History of placenta accreta in prior pregnancy, currently pregnant  O09.299    2. Chronic hypertension affecting pregnancy  O10.919    3. H/O postpartum hemorrhage, currently pregnant  O09.299    4. COVID-19 vaccine series completed  Z92.29    5. History of prior pregnancy with IUGR   Z87.59    6. High-risk pregnancy, unspecified trimester  O09.90 Chlamydia trachomatis PCR     Neisseria gonorrhoeae PCR       PLAN:  - Reviewed use of triage nurse line and contacting the on-call provider after hours for an urgent need such as fever, vagina bleeding, bladder or vaginal infection, " rupture of membranes,  or term labor.    - Reviewed best evidence for: weight gain for her weight and height for pregnancy:  Based on pre-pregnancy Body mass index is 27.64 kg/m . RECOMMENDED WEIGHT GAIN: 25-35 lbs.        - Reviewed healthy diet and foods to avoid, exercise and activity during pregnancy; avoiding exposure to toxoplasmosis; and maintenance of a generally healthy lifestyle.   - Discussed the harms, benefits, side effects and alternative therapies for current prescribed and OTC medications.    - Reviewed high risk for gestational diabetes d/t No known risk factors for GDM, IS NOT agreeable to early 1 hour today.  - Reviewed high risk for pre term labor, IS NOTagreeable  to 17P.  Cervical length screening IS NOT indicated.  - Reviewed High risk for Pre Eclampsia d/t Chronic hypertension  or meets two or more of the moderate risk factors including No moderate risk factors and ISagreeable to starting 81mg aspirin daily after 12 weeks .    - All pt's questions discussed and answered.  Pt verbalized understanding of and agreement to plan of care.   -will ref to chandrika for eval of HTN and med use  - Continue scheduled prenatal care and prn if questions or concerns    LASHAY Mackey CNM          Answers for HPI/ROS submitted by the patient on 2022  RITO 7 TOTAL SCORE: 2  General Symptoms: No  Skin Symptoms: No  HENT Symptoms: No  EYE SYMPTOMS: No  HEART SYMPTOMS: No  LUNG SYMPTOMS: No  INTESTINAL SYMPTOMS: No  URINARY SYMPTOMS: No  GYNECOLOGIC SYMPTOMS: No  BREAST SYMPTOMS: No  SKELETAL SYMPTOMS: No  BLOOD SYMPTOMS: No  NERVOUS SYSTEM SYMPTOMS: No  MENTAL HEALTH SYMPTOMS: No

## 2022-02-28 ENCOUNTER — PRE VISIT (OUTPATIENT)
Dept: MATERNAL FETAL MEDICINE | Facility: CLINIC | Age: 32
End: 2022-02-28
Payer: COMMERCIAL

## 2022-02-28 DIAGNOSIS — O10.919 CHRONIC HYPERTENSION AFFECTING PREGNANCY: Primary | ICD-10-CM

## 2022-03-06 ENCOUNTER — HEALTH MAINTENANCE LETTER (OUTPATIENT)
Age: 32
End: 2022-03-06

## 2022-03-07 ENCOUNTER — OFFICE VISIT (OUTPATIENT)
Dept: MATERNAL FETAL MEDICINE | Facility: CLINIC | Age: 32
End: 2022-03-07
Attending: REGISTERED NURSE
Payer: COMMERCIAL

## 2022-03-07 ENCOUNTER — OFFICE VISIT (OUTPATIENT)
Dept: MATERNAL FETAL MEDICINE | Facility: CLINIC | Age: 32
End: 2022-03-07
Attending: OBSTETRICS & GYNECOLOGY
Payer: COMMERCIAL

## 2022-03-07 ENCOUNTER — HOSPITAL ENCOUNTER (OUTPATIENT)
Dept: ULTRASOUND IMAGING | Facility: CLINIC | Age: 32
End: 2022-03-07
Attending: REGISTERED NURSE
Payer: COMMERCIAL

## 2022-03-07 ENCOUNTER — LAB (OUTPATIENT)
Dept: LAB | Facility: CLINIC | Age: 32
End: 2022-03-07
Attending: REGISTERED NURSE
Payer: COMMERCIAL

## 2022-03-07 VITALS — DIASTOLIC BLOOD PRESSURE: 98 MMHG | SYSTOLIC BLOOD PRESSURE: 137 MMHG

## 2022-03-07 DIAGNOSIS — Z36.9 ENCOUNTER FOR ANTENATAL SCREENING: ICD-10-CM

## 2022-03-07 DIAGNOSIS — O26.90 PREGNANCY RELATED CONDITION, ANTEPARTUM: ICD-10-CM

## 2022-03-07 DIAGNOSIS — Z36.9 ENCOUNTER FOR ANTENATAL SCREENING: Primary | ICD-10-CM

## 2022-03-07 DIAGNOSIS — O10.919 CHRONIC HYPERTENSION AFFECTING PREGNANCY: Primary | ICD-10-CM

## 2022-03-07 PROCEDURE — 76813 OB US NUCHAL MEAS 1 GEST: CPT

## 2022-03-07 PROCEDURE — 96040 HC GENETIC COUNSELING, EACH 30 MINUTES: CPT | Performed by: GENETIC COUNSELOR, MS

## 2022-03-07 PROCEDURE — 36415 COLL VENOUS BLD VENIPUNCTURE: CPT

## 2022-03-07 PROCEDURE — 76813 OB US NUCHAL MEAS 1 GEST: CPT | Mod: 26 | Performed by: OBSTETRICS & GYNECOLOGY

## 2022-03-07 PROCEDURE — 99242 OFF/OP CONSLTJ NEW/EST SF 20: CPT | Mod: 25 | Performed by: OBSTETRICS & GYNECOLOGY

## 2022-03-07 NOTE — PROGRESS NOTES
[facility]  Genetic Counseling Consult    Patient: Rosemarie Johnson YOB: 1990   Date of Service: 3/07/22      Rosemarie Johnson was seen at [facility] for genetic consultation to discuss the options for routine screening for fetal chromosome abnormalities. The patient was accompanied by her partner, Shun to today's visit.       Impression/Plan:   1.  Rosemarie had an ultrasound and blood draw for NIPT through [facility].  Results are expected within 7-10 days, and will be available in [facility].  We will contact her to discuss the results, and a copy will be forwarded to the office of the referring OB provider. Rosemarie provided verbal permission for results to be left on her voicemail, they have opted out of fetal sex reporting.     2. Maternal serum AFP (single marker screen) is recommended after 15 weeks to screen for open neural tube defects. A quad screen should not be performed.    3. Rosemarie has a history of chronic hypertension managed with nifedipine. Rosemarie had a MFM consultation today, please see corresponding report.     Pregnancy History:   /Parity:    Age at Delivery: 32 year old  CRISTHIAN: 2022, by Last Menstrual Period  Gestational Age: 12w2d    No significant complications or exposures were reported in the current pregnancy.    Rosemarie s pregnancy history is significant for  c/s delivery at 30+2, pregnancy was complicated by IUGR, two vessel cord, and vasa previa.     Medical History:   Rosemarie has a history of chronic hypertension managed with nifedipine and she will be beginning baby ASA. She reported undergoing renal ultrasound after her last pregnancy which was WNL. Rosemarie had a MFM consultation today, please see corresponding report.        Family History:   A three-generation pedigree was obtained, and is scanned under the  Media  tab.   The following significant findings were reported by  Rosemarie:  Rosemarie's maternal aunt and maternal first cousin were reported to have factor V leiden and a history of blood clots in her aunt. Factor V Leiden thrombophilia is an inherited blood clotting disorder caused by a particular genetic variant in the F5 gene that provides instructions for a protein called coagulator factor V. Individuals with factor V leiden thrombophilia are at higher risk of developing a deep venous thrombosis (DVT) which can occur throughout the body. Rosemarie reported undergoing testing, which was negative. Result not available for review at time of our discussion.   The couple's son, Morro was reported to have a PDA. We reviewed that this is a normal hole in heart structure that infants are born with that typically closes shortly after birth on it's own. However, in a portion of infants it may not close properly. We discussed that prenatal screening by ultrasound or fetal echocardiogram are not necessarily useful for these types of heart defects as we would expect this hole to be open in a fetus. Rosemarie was encouraged to share this family history information with their pediatrician.   Shun's paternal first cousin was reported to have a concern regarding genitalia at birth and required many healthcare visits. It is also thought she may have a blood disorder. We reviewed that these features can be part of a broader underlying genetic etiology, however without more specific information, micha assessment is challenging. If more information is gathered regarding this individual it would be reasonable to revisit this family history for a more accurate risk assessment.     Otherwise, the reported family history is negative for multiple miscarriages, stillbirths, birth defects, intellectual disability, known genetic conditions, and consanguinity.       Carrier Screening:   The patient reports that she and the father of the pregnancy have  ancestry:     Cystic fibrosis is an autosomal recessive  genetic condition that occurs with increased frequency in individuals of  ancestry and carrier screening for this condition is available.  In addition,  screening in the [facility] includes cystic fibrosis.      Expanded carrier screening for mutations in a large panel of genes associated with autosomal recessive conditions including cystic fibrosis, spinal muscular atrophy, and others, is now available.      The patient has declined the carrier screening options reviewed today.       Risk Assessment for Chromosome Conditions:   We explained that the risk for fetal chromosome abnormalities increases with maternal age. We discussed specific features of common chromosome abnormalities, including Down syndrome, trisomy 13, trisomy 18, and sex chromosome trisomies.      - At age 32 at midtrimester, the risk to have a baby with Down syndrome is 1 in 508.     - At age 32 at midtrimester, the risk to have a baby with any chromosome abnormality is 1 in 254.        Testing Options:   We discussed the following options:   First trimester screening    First trimester ultrasound with nuchal translucency and nasal bone assessments, maternal plasma hCG, DEVAN-A, and AFP measurement    Screens for fetal trisomy 21, trisomy 13, and trisomy 18    Cannot screen for open neural tube defects; maternal serum AFP after 15 weeks is recommended     Non-invasive Prenatal Testing (NIPT)    Maternal plasma cell-free DNA testing; first trimester ultrasound with nuchal translucency and nasal bone assessment is recommended, when appropriate    Screens for fetal trisomy 21, trisomy 13, trisomy 18, and sex chromosome aneuploidy    Cannot screen for open neural tube defects; maternal serum AFP after 15 weeks is recommended     Chorionic villus sampling (CVS)    Invasive procedure typically performed in the first trimester by which placental villi are obtained for the purpose of chromosome analysis and/or other prenatal genetic  analysis    Diagnostic results; >99% sensitivity for fetal chromosome abnormalities    Cannot test for open neural tube defects; maternal serum AFP after 15 weeks is recommended     Genetic Amniocentesis    Invasive procedure typically performed in the second trimester by which amniotic fluid is obtained for the purpose of chromosome analysis and/or other prenatal genetic analysis    Diagnostic results; >99% sensitivity for fetal chromosome abnormalities    AFAFP measurement tests for open neural tube defects     Comprehensive (Level II) ultrasound: Detailed ultrasound performed between 18-22 weeks gestation to screen for major birth defects and markers for aneuploidy.      We reviewed the benefits and limitations of this testing.  Screening tests provide a risk assessment specific to the pregnancy for certain fetal chromosome abnormalities, but cannot definitively diagnose or exclude a fetal chromosome abnormality.  Follow-up genetic counseling and consideration of diagnostic testing is recommended with any abnormal screening result.     Diagnostic tests carry inherent risks- including risk of miscarriage- that require careful consideration.  These tests can detect fetal chromosome abnormalities with greater than 99% certainty.  Results can be compromised by maternal cell contamination or mosaicism, and are limited by the resolution of cytogenetic G-banding technology.  There is no screening nor diagnostic test that can detect all forms of birth defects or mental disability.     It was a pleasure to be involved with Rosemarie [facility]. Face-to-face time of the meeting was 30 minutes.    Kary Rudd MS, [facility]  Licensed Genetic Counselor  [facility]  Maternal Fetal Medicine  Ph: [phone]  [email or web address]

## 2022-03-09 DIAGNOSIS — O09.291 HISTORY OF PRE-ECLAMPSIA IN PRIOR PREGNANCY, CURRENTLY PREGNANT IN FIRST TRIMESTER: Primary | ICD-10-CM

## 2022-03-09 NOTE — PROGRESS NOTES
Dear Dr. Pinto,     Thank you for your request for a Maternal Fetal Medicine consultation on Ms. Rosemarie Johnson due to her prior complicated pregnancy.     Rosemarie is a 31 y/o  @ 12 3/7 weeks gestation here for a consultation and genetic screening.  She is taking nifedipine 30mg XL for chronic HTN.  She has a hx of chronic HTN, presumed to be essential HTN.  She had a renal artery US following in Oct 2020 which was normal.  Strong family hx of HTN.  Patient is not sure how long she has had HTN.  When she donates blood to [facility] she has been turned away previously for higher BP.  Her primary physician at the time was just monitoring it without medications.  During her pregnancy, Rosemarie's BP began increasing around 16 weeks and she was started on nifedipine 30mg XL along with a baby ASA.  Her pregnancy was complicated by FGR diagnosed around 29 weeks with abnormal UA Dopplers with intermittent reversal.  She was sent to [facility] for prolonged fetal monitoring.  While being observed, she had sustained severe range BPs requiring short acting antihypertensive agents. Despite not having proteinuria >300mg on 24 hr specimen, her clinical picture was concerning for superimposed preE.  Other complications in that pregnancy included a 2 vessel umbilical cord, severe FGR 2%ile, vasa previa.  She was delivered via CD. A HIGH TRANSVERSE INCISION was made (thought to be low transverse but after delivery was noted to be in the contractile portion of the uterus).      Her postoperative course was complicated by PPH due to uterine atony. She received pitocin and rectal cytotec, uterine massage in the OR.  Her QBL was 765 ml.  In PACU she had another 1000 ml blood loss and was given hemabate, TXA x 2.  She was ultimately transfused with 1 unit(s) PRBC prior to discharge.  The final placental path showed a small focal accreta which likely contributed to her PPH.  See the full path report below.     Her BP remained mild  and she was maintained on nifedipine 30 mg XL.  She transitioned to an ARB once she wasn't breastfeeding and then went back on nifedipine 30mg XL in preparation for another pregnancy.     Regarding her infant, her baby weighed 840 grams at 30 2/7 weeks (1lb 13 oz).  Baby spent 60 days in the NICU and went home on low flow O2.  Now her almost 2 yr old is great with no complications of prematurity.  Had a PDA (may still have one) and had courses of tylenol in NICU to close it.        Placental Path:    FINAL DIAGNOSIS   A. Allen placenta: Superficially disrupted and   incomplete, 199 g, small (<10th percentile) for gestational   age of 30 weeks.   Umbilical cord   -  Two blood vessels (single umbilical artery)   -  Acute umbilical vein vasculitis, mild   Extraplacental membranes   -  No abnormalities   Chorionic plate   -  Thrombus, multiple, occlusive and non-occlusive   Intervillous space   -  No abnormalities   Stem and terminal villi   -  Distal villous hypoplasia   -  High grade fetal vascular malperfusion:  Villous   stromal-vascular karyorrhexis, stem vessel obliteration,   avascular villi, chorionic plate thrombi (see above)   -  Infarct, single, remote, involving <1% of the placental   parenchyma   Decidua   -  Decidual arteriopathy:  Mural hypertrophy, fibrinoid   necrosis with acute atherosis   -  Focal occult placenta accreta      Rosemarie is understandably worried about this pregnancy given her history.     PMH:   Chronic HTN- on nifedipine. Patient takes her BP at home.  Today BP was 137/98.     PSH:   Primary CD- HIGH TRANSVERSE    Meds:   PNV, Nifedipine  Plans to begin a baby ASA 81 mg today    All: none    FH:   Mom and Dad have HTN  See GC report for further details    Social: moved with  to [facility] area in Nov 2021, works in [employer], denies t/e/d    Chronic Hypertension    The concern with chronic hypertension is that such patients are more likely to develop preeclampsia during the  pregnancy, with a risk of 20-50%.  Women with chronic hypertension are at increased risk for early-onset preeclampsia.  Low dose aspirin has been used to lower this risk.  Chronic hypertension also increases the risk of maternal stroke, pulmonary edema, renal failure, gestational diabetes, iatrogenic  birth, fetal growth restriction, placental abruption and  mortality rate including stillbirth    Without baseline laboratory assessment, it may be difficult to distinguish an exacerbation of hypertension from preeclampsia, especially in the third trimester. We reviewed that it is generally anticipated that blood pressure will gradually decrease during early pregnancy, reaching at cecelia at 28-32 weeks, and then slowly rise to pre-pregnancy levels.     Guidelines suggest starting antihypertensive medication in women with chronic hypertension if the systolic blood pressure is persistently 160 mmHg or higher or the diastolic blood pressure is persistently 110 mmHg or higher.  If there is evidence of end organ damage (renal insufficiency, left ventricular hypertrophy or severe thrombocytopenia) a lower threshold of 150 mmHg systolic and/or 100 mm Hg diastolic is recommended. Treatment with antihypertensives is generally used to maintain blood pressure in the general range of 120-159 mmHg systolic and  mmHg diastolic. Lower blood pressures may increase the risk of fetal growth restriction. The primary reason for antihypertensive is to reduce the risk of maternal stroke. Recommended antihypertensives with studied safety profiles in pregnancy include nifedipine and labetalol. Unfortunately, even exquisite control of blood pressure does not reduce the risk of superimposed preeclampsia.     Recommendations:    Continue/initiate necessary medications and titrate as needed to achieve blood pressure goal    Low dose aspirin for preeclampsia prevention, ideally started between 12-16 weeks.  Patient will start  today.     Baseline studies:   o Electrocardiogram, if abnormal this should be followed up with an echocardiogram  o Liver function tests, creatinine and blood urea nitrogen (ordered, patient to go to the lab)  o Spot protein/creatinine ratio (ordered patient to go to the lab)    Close monitoring evidence of superimposed preeclampsia  o More frequent blood pressure monitoring; she should check her blood pressure at least weekly until 28 weeks at which point she should check it at least two times per week; if elevated or medication adjustments are needed, it should be taken more frequently  o Her blood pressure cuff should be titrated in the office.  She brought it today and it was similar to our clinic BP cuff.   o More frequent prenatal visits are indicated with hypertension when medication modifications are necessary  o Calling parameters should be clearly laid out with regards to blood pressure and symptoms  o Repeat labs as clinically indicated, with a low threshold to rule-out superimposed preeclampsia, especially if it is thought that medication may need to be increased    Comprehensive (level II) anatomic ultrasound- Scheduled with MFM.     Ultrasounds for growth monthly after comprehensive ultrasound- to be done by MFM per patient preference at our [place] location.     fetal surveillance with weekly BPP starting at 32 weeks     Delivery via repeat CD at 36-37 weeks given hx of prior HIGH TRANSVERSE  SECTION.     Early postpartum visit (within the first week) for blood pressure assessment    Postpartum, medication should be adjusted to maintain the systolic blood pressure below 150 mm Hg and the diastolic blood pressure below 100 mm Hg    Long-term care with primary care/cardiology- Patient has seen Dr. Russo.     Postpartum lifestyle modifications if appropriate      Previous Pregnancy Complicated by Fetal Growth Restriction  Fetal growth restriction (FGR) is defined as estimated fetal weight  below the 10th percentile for gestational age.  The etiology of FGR can be classified as maternal, fetal or placental.  Examples of maternal etiologies of FGR include medical conditions such as pre-existing diabetes, lupus, antiphospholipid antibody syndrome, hypertension, certain infections as well as medication/substance use.   IVF is also assocaited with an increased risk. Fetal etiologies include multiple gestation, aneuploidy, and structural anomalies.  Placental or umbilical cord abnormalities can also lead to growth restriction.  Pregnancies with fetal growth restriction are at increased risk of adverse  outcomes, such as fetal demise, especially when the estimated fetal weight is below the 5th percentile.  Other adverse outcomes include hypoglycemia, hyperbilirubienemia, hypothermia, intraventricular hemorrhage, necrotizing enterocolitis, seizures, sepsis, respiratory distress syndrome and  death.  Additionally, some pregnancies are delivered  in the setting of abnormal Doppler studies, lack of interval growth, or abnormal fetal testing on NST or biophysical profile.  Furthermore there is growing evidence that small for gestational age children have increased risks of certain diseases in adulthood.      In women who have previously had a small for gestational age  the recurrence risk is estimated to be 20 percent.  A careful history should be obtained to identify potential modifiable risk factors.  Women should be evaluated for the antiphospholipid antibody syndrome if they have had a prior delivery at or before 34 weeks of a morphologically normal infant in the setting of growth restriction. Although there is no known prevention, these subsequent pregnancies can have serial assessments of fetal growth by ultrasound.      Recommendations:    Serial estimates of fetal weight via ultrasounds- see above for US schedule    APLS evaluation with lupus anticoagulant, anticardiolipin  antibody and beta-2 glycoprotein (ordered)- patient to stop by the lab.       Previous Pregnancy Complicated By Preeclampsia    A history of preeclampsia in the past confers a recurrence risk of up to 25-30%. A history of severe preeclampsia remote from term may have a recurrence risk of almost 70%.  Subsequent pregnancies in women with a history of severe preeclampsia or eclampsia are also at increased risk of other obstetric complications compared to women with no such history. These problems include:  placental abruption,  delivery, intrauterine growth restriction, and increased  mortality.  Low dose aspirin is recommended in women with previous preeclampsia to prevent recurrence.  It should be initiated late in the first trimester. Women who have experienced preeclampsia are at a lifelong increased risk for cardiovascular disease and healthy lifestyle should be emphasized.    Previous Pregnancy Complicated by PPH/focal accreta on placental pathology    We reviewed the higher risk for PPH again or the possibility of abnormal placentation.  We will continue to evaluate the placenta-uterine interface throughout the pregnancy with US, but ultimately, preparations should be in place for the potential of a PPH at the time of her repeat CD.   - Optimize hgb values in pregnancy by treating iron deficiency aggressively.  - Type and cross at the time of delivery  - Uterotonic medications to have on hand include hemabate, pitocin, cytotec  - Consider TXA  - Avoid methergine       Thank you for the opportunity to participate in the care of your patient.  We will continue to co-manage with you.     I spent a total of 38 minutes with patient  - 22 min counseling  - 10 min chart review  - 6 min documentation    Nicole Izaguirre DO [facility]  Maternal Fetal Medicine Specialist  Pager: [phone]  Mobile: [phone]

## 2022-03-14 ENCOUNTER — TELEPHONE (OUTPATIENT)
Dept: MATERNAL FETAL MEDICINE | Facility: CLINIC | Age: 32
End: 2022-03-14
Payer: COMMERCIAL

## 2022-03-14 LAB — SCANNED LAB RESULT: NORMAL

## 2022-03-14 NOTE — TELEPHONE ENCOUNTER
March 14, 2022   I spoke with Rosemarie regarding her NIPT results.     Results indicate NO ANEUPLOIDY DETECTED for chromosomes 21, 18, 13, or sex chromosomes.     This puts her current pregnancy at low risk for Down syndrome, trisomy 18, trisomy 13 and sex chromosome abnormalities. This test is reported to have the following sensitivities: Down syndrome: 99.99%, trisomy 18: 99.99%, and trisomy 13: 99.99%. Although these results are reassuring, this does not replace a standard chromosome analysis from a chorionic villus sampling or amniocentesis.     MSAFP is the appropriate second trimester screening test for open neural tube defects; the maternal quad screen is not recommended.    Her results are available in her Epic chart for her primary OB to review.     Kary Rudd MS, [facility]  Licensed Genetic Counselor  [facility]  Maternal Fetal Medicine  Ph: [phone]  [email or web address]

## 2022-03-31 ENCOUNTER — LAB (OUTPATIENT)
Dept: LAB | Facility: CLINIC | Age: 32
End: 2022-03-31
Payer: COMMERCIAL

## 2022-03-31 DIAGNOSIS — O10.919 CHRONIC HYPERTENSION AFFECTING PREGNANCY: ICD-10-CM

## 2022-03-31 DIAGNOSIS — O09.291 HISTORY OF PRE-ECLAMPSIA IN PRIOR PREGNANCY, CURRENTLY PREGNANT IN FIRST TRIMESTER: ICD-10-CM

## 2022-03-31 LAB
CREAT UR-MCNC: 90 MG/DL
PROT UR-MCNC: 0.08 G/L
PROT/CREAT 24H UR: 0.09 G/G CR (ref 0–0.2)

## 2022-03-31 PROCEDURE — 85730 THROMBOPLASTIN TIME PARTIAL: CPT

## 2022-03-31 PROCEDURE — 36415 COLL VENOUS BLD VENIPUNCTURE: CPT

## 2022-03-31 PROCEDURE — 85613 RUSSELL VIPER VENOM DILUTED: CPT

## 2022-03-31 PROCEDURE — 82565 ASSAY OF CREATININE: CPT

## 2022-03-31 PROCEDURE — 86146 BETA-2 GLYCOPROTEIN ANTIBODY: CPT

## 2022-03-31 PROCEDURE — 85390 FIBRINOLYSINS SCREEN I&R: CPT | Performed by: PATHOLOGY

## 2022-03-31 PROCEDURE — 84156 ASSAY OF PROTEIN URINE: CPT

## 2022-03-31 PROCEDURE — 86147 CARDIOLIPIN ANTIBODY EA IG: CPT

## 2022-03-31 PROCEDURE — 84450 TRANSFERASE (AST) (SGOT): CPT

## 2022-03-31 PROCEDURE — 84460 ALANINE AMINO (ALT) (SGPT): CPT

## 2022-03-31 PROCEDURE — 86146 BETA-2 GLYCOPROTEIN ANTIBODY: CPT | Mod: 59

## 2022-04-01 LAB
ALT SERPL W P-5'-P-CCNC: 22 U/L (ref 0–50)
AST SERPL W P-5'-P-CCNC: 14 U/L (ref 0–45)
CREAT SERPL-MCNC: 0.59 MG/DL (ref 0.52–1.04)
DRVVT SCREEN RATIO: 0.97
GFR SERPL CREATININE-BSD FRML MDRD: >90 ML/MIN/1.73M2
INR PPP: 1.01 (ref 0.85–1.15)
LA PPP-IMP: NEGATIVE
LUPUS INTERPRETATION: NORMAL
PTT RATIO: 0.97
THROMBIN TIME: 15 SECONDS (ref 13–19)

## 2022-04-04 LAB
B2 GLYCOPROT1 IGG SERPL IA-ACNC: <0.8 U/ML
B2 GLYCOPROT1 IGM SERPL IA-ACNC: <2.4 U/ML
CARDIOLIPIN IGG SER IA-ACNC: <2 GPL-U/ML
CARDIOLIPIN IGG SER IA-ACNC: NEGATIVE
CARDIOLIPIN IGM SER IA-ACNC: 2.5 MPL-U/ML
CARDIOLIPIN IGM SER IA-ACNC: NEGATIVE

## 2022-04-11 ENCOUNTER — OFFICE VISIT (OUTPATIENT)
Dept: OBGYN | Facility: CLINIC | Age: 32
End: 2022-04-11
Attending: OBSTETRICS & GYNECOLOGY
Payer: COMMERCIAL

## 2022-04-11 VITALS
HEART RATE: 86 BPM | DIASTOLIC BLOOD PRESSURE: 92 MMHG | WEIGHT: 186 LBS | SYSTOLIC BLOOD PRESSURE: 141 MMHG | BODY MASS INDEX: 28.7 KG/M2

## 2022-04-11 DIAGNOSIS — O09.90 HIGH-RISK PREGNANCY, UNSPECIFIED TRIMESTER: Primary | ICD-10-CM

## 2022-04-11 PROCEDURE — 99207 PR PRENATAL VISIT: CPT | Performed by: OBSTETRICS & GYNECOLOGY

## 2022-04-11 NOTE — PROGRESS NOTES
Subjective:      32 year old  at 17w2d presents for a routine prenatal appointment.      Denies vaginal bleeding or leakage of fluid.  No contractions. Very mild, infrequent, tolerable cramping.   Occasionally notices fetal movements.       No HA, visual changes, RUQ or epigastric pain.   The patient presents with the following concerns: COVID prevention during pregnancy, BP> 140/90 during pregnancy  Level II US  Scheduled.   Offered QS, AFP and DECLINED     Objective:  Vitals:    22 1604   Weight: 84.4 kg (186 lb)     BP (!) 141/92   Pulse 86   Wt 84.4 kg (186 lb)   LMP 2021   Breastfeeding No   BMI 28.70 kg/m      See OB flowsheet    Physical exam:  General - pleasant female in no acute distress.  Neck - supple without lymphadenopathy or thyromegaly.  Lungs - normal work and rate of breathing  Heart - hemodynamically stable  Musculoskeletal - no gross deformities.  Neurological - normal mental status, spontaneous movement  Psych - mentation and affect normal    Assessment/Plan  17-24 week visit     - Fetal heart rate normal at 135-142 bpm  - Provided general masking, social distancing, etc advice for COVID-19, pt has received full series of recommended vaccinations.  - Reinforced that pt BP in goal and goal not to overly-aggressively treat hypertension given risk of fetal growth restriction. Continue on current dose of nifedipine.  - Reviewed why/how to contact provider.    Patient education/orders or handouts today: None   Return to clinic in 12 weeks and prn if questions or concerns.     This report was completed by:  Lino Torres, MS3  [employer]    The above patient was seen and evaluated with the medical student who acted as my scribe for the above note. Agree with note, changes made as appropriate.    Tabitha Pinto MD

## 2022-04-11 NOTE — LETTER
Date:April 14, 2022      Provider requested that no letter be sent. Do not send.       [facility]

## 2022-04-11 NOTE — PROGRESS NOTES
Chief Complaint   Patient presents with     Prenatal Care     MAURICE 17 weeks and 2 days   Charito Donaldson LPN

## 2022-04-11 NOTE — LETTER
2022       RE: Rosemarie Johnson  [address] [place] [address]     Dear Colleague,    Thank you for referring your patient, Rosemarie Johnson, to the [facility] [place] at [facility]. Please see a copy of my visit note below.    Chief Complaint   Patient presents with     Prenatal Care     MAURICE 17 weeks and 2 days   Charito Donaldson LPN    Subjective:      32 year old  at 17w2d presents for a routine prenatal appointment.      Denies vaginal bleeding or leakage of fluid.  No contractions. Very mild, infrequent, tolerable cramping.   Occasionally notices fetal movements.       No HA, visual changes, RUQ or epigastric pain.   The patient presents with the following concerns: COVID prevention during pregnancy, BP> 140/90 during pregnancy  Level II US  Scheduled.   Offered QS, AFP and DECLINED     Objective:  Vitals:    22 1604   Weight: 84.4 kg (186 lb)     BP (!) 141/92   Pulse 86   Wt 84.4 kg (186 lb)   LMP 2021   Breastfeeding No   BMI 28.70 kg/m      See OB flowsheet    Physical exam:  General - pleasant female in no acute distress.  Neck - supple without lymphadenopathy or thyromegaly.  Lungs - normal work and rate of breathing  Heart - hemodynamically stable  Musculoskeletal - no gross deformities.  Neurological - normal mental status, spontaneous movement  Psych - mentation and affect normal    Assessment/Plan  17-24 week visit     - Fetal heart rate normal at 135-142 bpm  - Provided general masking, social distancing, etc advice for COVID-19, pt has received full series of recommended vaccinations.  - Reinforced that pt BP in goal and goal not to overly-aggressively treat hypertension given risk of fetal growth restriction. Continue on current dose of nifedipine.  - Reviewed why/how to contact provider.    Patient education/orders or handouts today: None   Return to clinic in 12 weeks and prn if  questions or concerns.     This report was completed by:  Lino Torres, MS3  [employer]    The above patient was seen and evaluated with the medical student who acted as my scribe for the above note. Agree with note, changes made as appropriate.    Tabitha Pinto MD              Again, thank you for allowing me to participate in the care of your patient.      Sincerely,    Tabitha Pinto MD

## 2022-04-25 ENCOUNTER — HOSPITAL ENCOUNTER (OUTPATIENT)
Dept: ULTRASOUND IMAGING | Facility: CLINIC | Age: 32
Discharge: HOME | End: 2022-04-25
Attending: OBSTETRICS & GYNECOLOGY
Payer: COMMERCIAL

## 2022-04-25 ENCOUNTER — OFFICE VISIT (OUTPATIENT)
Dept: MATERNAL FETAL MEDICINE | Facility: CLINIC | Age: 32
End: 2022-04-25
Attending: OBSTETRICS & GYNECOLOGY
Payer: COMMERCIAL

## 2022-04-25 DIAGNOSIS — O10.919 CHRONIC HYPERTENSION AFFECTING PREGNANCY: Primary | ICD-10-CM

## 2022-04-25 DIAGNOSIS — O10.919 CHRONIC HYPERTENSION AFFECTING PREGNANCY: ICD-10-CM

## 2022-04-25 PROCEDURE — 76811 OB US DETAILED SNGL FETUS: CPT

## 2022-04-25 PROCEDURE — 76811 OB US DETAILED SNGL FETUS: CPT | Mod: 26 | Performed by: OBSTETRICS & GYNECOLOGY

## 2022-04-25 NOTE — PROGRESS NOTES
"Please see \"imaging\" tab under \"Chart Review\" for details of today's US at the [facility].    Zain Savage MD  Maternal-Fetal Medicine    "

## 2022-05-10 ENCOUNTER — OFFICE VISIT (OUTPATIENT)
Dept: INTERNAL MEDICINE | Facility: CLINIC | Age: 32
End: 2022-05-10
Attending: INTERNAL MEDICINE
Payer: COMMERCIAL

## 2022-05-10 VITALS
WEIGHT: 191 LBS | HEART RATE: 88 BPM | BODY MASS INDEX: 29.47 KG/M2 | SYSTOLIC BLOOD PRESSURE: 131 MMHG | DIASTOLIC BLOOD PRESSURE: 94 MMHG

## 2022-05-10 DIAGNOSIS — O10.919 CHRONIC HYPERTENSION AFFECTING PREGNANCY: Primary | ICD-10-CM

## 2022-05-10 PROCEDURE — G0463 HOSPITAL OUTPT CLINIC VISIT: HCPCS

## 2022-05-10 PROCEDURE — 99214 OFFICE O/P EST MOD 30 MIN: CPT | Performed by: INTERNAL MEDICINE

## 2022-05-10 RX ORDER — NIFEDIPINE 60 MG/1
60 TABLET, EXTENDED RELEASE ORAL DAILY
Qty: 30 TABLET | Refills: 3 | Status: SHIPPED | OUTPATIENT
Start: 2022-05-10 | End: 2022-06-20

## 2022-05-10 ASSESSMENT — ANXIETY QUESTIONNAIRES
1. FEELING NERVOUS, ANXIOUS, OR ON EDGE: SEVERAL DAYS
7. FEELING AFRAID AS IF SOMETHING AWFUL MIGHT HAPPEN: NOT AT ALL
8. IF YOU CHECKED OFF ANY PROBLEMS, HOW DIFFICULT HAVE THESE MADE IT FOR YOU TO DO YOUR WORK, TAKE CARE OF THINGS AT HOME, OR GET ALONG WITH OTHER PEOPLE?: NOT DIFFICULT AT ALL
GAD7 TOTAL SCORE: 2
6. BECOMING EASILY ANNOYED OR IRRITABLE: NOT AT ALL
3. WORRYING TOO MUCH ABOUT DIFFERENT THINGS: SEVERAL DAYS
5. BEING SO RESTLESS THAT IT IS HARD TO SIT STILL: NOT AT ALL
GAD7 TOTAL SCORE: 2
GAD7 TOTAL SCORE: 2
4. TROUBLE RELAXING: NOT AT ALL
2. NOT BEING ABLE TO STOP OR CONTROL WORRYING: NOT AT ALL
7. FEELING AFRAID AS IF SOMETHING AWFUL MIGHT HAPPEN: NOT AT ALL

## 2022-05-10 NOTE — PROGRESS NOTES
"  Assessment & Plan     Chronic hypertension affecting pregnancy  Discussed blood pressure goals in pregnancy.  Given chronic hypertension, recommend blood pressure goal under 140/90 given that patient's blood pressure is elevated today, recommend increasing the dose of nifedipine to 60 mg.  Patient has follow-up with OB/GYN for routine prenatal visit in 1 week, will have her blood pressure checked at the visit.  Patient was also advised on home monitoring of blood pressure.  Discussed signs and symptoms of preeclampsia.  Patient is aware of concerning signs and the need to seek urgent medical evaluation.  - NIFEdipine ER OSMOTIC (PROCARDIA XL) 60 MG 24 hr tablet; Take 1 tablet (60 mg) by mouth daily      I spent a total of 30 minutes on the day of the visit.   Time spent doing chart review, history and exam, documentation and further activities per the note       BMI:   Estimated body mass index is 29.47 kg/m  as calculated from the following:    Height as of 2/21/22: 1.715 m (5' 7.5\").    Weight as of this encounter: 86.6 kg (191 lb).           No follow-ups on file.    Marti Russo MD  [facility] J[place] Houston is a 32 year old who presents for the following health issues     HPI     Patient is here for follow up on elevated blood pressure in pregnancy.     Review of Systems   Constitutional, HEENT, cardiovascular, pulmonary, GI, , musculoskeletal, neuro, skin, endocrine and psych systems are negative, except as otherwise noted.      Objective    BP (!) 131/94   Pulse 88   Wt 86.6 kg (191 lb)   LMP 12/11/2021   Breastfeeding No   BMI 29.47 kg/m    Body mass index is 29.47 kg/m .  Physical Exam   GENERAL: healthy, alert and no distress  EYES: Eyes grossly normal to inspection, PERRL and conjunctivae and sclerae normal  NECK: no adenopathy, no asymmetry, masses, or scars and thyroid normal to palpation  RESP: normal effort, no wheezing  CV: regular rates and " rhythm, peripheral pulses strong and no peripheral edema  ABDOMEN: soft, nontender and bowel sounds normal  MS: no gross musculoskeletal defects noted, no edema

## 2022-05-10 NOTE — LETTER
"5/10/2022       RE: Rosemarie Johnson  [address] [place] [address]     Dear Colleague,    Thank you for referring your patient, Rosemarie Johnson, to the [facility] [place] at [facility]. Please see a copy of my visit note below.    Chief Complaint   Patient presents with     [facility] Care     B/P check OB 21 weeks and 3 day   Charito Donaldson LPN  Answers for HPI/ROS submitted by the patient on 5/10/2022  RITO 7 TOTAL SCORE: 2  General Symptoms: No  Skin Symptoms: No  HENT Symptoms: No  EYE SYMPTOMS: No  HEART SYMPTOMS: No  LUNG SYMPTOMS: No  INTESTINAL SYMPTOMS: No  URINARY SYMPTOMS: No  GYNECOLOGIC SYMPTOMS: No  BREAST SYMPTOMS: No  SKELETAL SYMPTOMS: No  BLOOD SYMPTOMS: No  NERVOUS SYSTEM SYMPTOMS: No  MENTAL HEALTH SYMPTOMS: No          Assessment & Plan     Chronic hypertension affecting pregnancy  Discussed blood pressure goals in pregnancy.  Given chronic hypertension, recommend blood pressure goal under 140/90 given that patient's blood pressure is elevated today, recommend increasing the dose of nifedipine to 60 mg.  Patient has follow-up with OB/GYN for routine prenatal visit in 1 week, will have her blood pressure checked at the visit.  Patient was also advised on home monitoring of blood pressure.  Discussed signs and symptoms of preeclampsia.  Patient is aware of concerning signs and the need to seek urgent medical evaluation.  - NIFEdipine ER OSMOTIC (PROCARDIA XL) 60 MG 24 hr tablet; Take 1 tablet (60 mg) by mouth daily      I spent a total of 30 minutes on the day of the visit.   Time spent doing chart review, history and exam, documentation and further activities per the note       BMI:   Estimated body mass index is 29.47 kg/m  as calculated from the following:    Height as of 2/21/22: 1.715 m (5' 7.5\").    Weight as of this encounter: 86.6 kg (191 lb).           No follow-ups on file.    Marti Russo MD  M " [facility] JAY[place] is a 32 year old who presents for the following health issues     HPI     Patient is here for follow up on elevated blood pressure in pregnancy.     Review of Systems   Constitutional, HEENT, cardiovascular, pulmonary, GI, , musculoskeletal, neuro, skin, endocrine and psych systems are negative, except as otherwise noted.      Objective    BP (!) 131/94   Pulse 88   Wt 86.6 kg (191 lb)   LMP 12/11/2021   Breastfeeding No   BMI 29.47 kg/m    Body mass index is 29.47 kg/m .  Physical Exam   GENERAL: healthy, alert and no distress  EYES: Eyes grossly normal to inspection, PERRL and conjunctivae and sclerae normal  NECK: no adenopathy, no asymmetry, masses, or scars and thyroid normal to palpation  RESP: normal effort, no wheezing  CV: regular rates and rhythm, peripheral pulses strong and no peripheral edema  ABDOMEN: soft, nontender and bowel sounds normal  MS: no gross musculoskeletal defects noted, no edema                    Again, thank you for allowing me to participate in the care of your patient.      Sincerely,    Marti Russo MD

## 2022-05-10 NOTE — LETTER
Date:May 16, 2022      Provider requested that no letter be sent. Do not send.       [facility]

## 2022-05-10 NOTE — PROGRESS NOTES
Chief Complaint   Patient presents with     [facility] Care     B/P check OB 21 weeks and 3 day   Charito Donaldson LPN  Answers for HPI/ROS submitted by the patient on 5/10/2022  RITO 7 TOTAL SCORE: 2  General Symptoms: No  Skin Symptoms: No  HENT Symptoms: No  EYE SYMPTOMS: No  HEART SYMPTOMS: No  LUNG SYMPTOMS: No  INTESTINAL SYMPTOMS: No  URINARY SYMPTOMS: No  GYNECOLOGIC SYMPTOMS: No  BREAST SYMPTOMS: No  SKELETAL SYMPTOMS: No  BLOOD SYMPTOMS: No  NERVOUS SYSTEM SYMPTOMS: No  MENTAL HEALTH SYMPTOMS: No

## 2022-05-11 ASSESSMENT — ANXIETY QUESTIONNAIRES: GAD7 TOTAL SCORE: 2

## 2022-05-18 ENCOUNTER — OFFICE VISIT (OUTPATIENT)
Dept: OBGYN | Facility: CLINIC | Age: 32
End: 2022-05-18
Attending: OBSTETRICS & GYNECOLOGY
Payer: COMMERCIAL

## 2022-05-18 VITALS
HEART RATE: 80 BPM | DIASTOLIC BLOOD PRESSURE: 90 MMHG | BODY MASS INDEX: 29.63 KG/M2 | SYSTOLIC BLOOD PRESSURE: 130 MMHG | WEIGHT: 192 LBS

## 2022-05-18 DIAGNOSIS — O09.90 HIGH-RISK PREGNANCY, UNSPECIFIED TRIMESTER: Primary | ICD-10-CM

## 2022-05-18 DIAGNOSIS — O10.919 CHRONIC HYPERTENSION AFFECTING PREGNANCY: ICD-10-CM

## 2022-05-18 PROCEDURE — G0463 HOSPITAL OUTPT CLINIC VISIT: HCPCS

## 2022-05-18 PROCEDURE — 99207 PR PRENATAL VISIT: CPT | Performed by: OBSTETRICS & GYNECOLOGY

## 2022-05-18 NOTE — LETTER
Date:May 20, 2022      Provider requested that no letter be sent. Do not send.       [facility]

## 2022-05-18 NOTE — LETTER
2022       RE: Rosemarie Johnson  [address] [place] [address]     Dear Colleague,    Thank you for referring your patient, Rosemarie Johnson, to the [facility] Agency at [facility]. Please see a copy of my visit note below.    [facility]  Return OB Visit    S:   Denies vaginal bleeding, vaginal discharge, LOF, contractions.  Reports good fetal movement. Denies headache, vision changes, RUQ pain, chest pain, SOB. She does report mild round ligament pain along with constipation and bloating. Her last BM was a few days ago. She notes that her blood pressures have been in the low 120's systolic for the past few days, and she has been diligently taking her procardia, aspirin, and prenatal vitamins.     Her work has been somewhat stressful lately, though she denies any significant anxiety or sleep deficits. She inquires about scheduling all of her 4 week follow-up visits at the same time so she will have to take less time off work. She has no other concerns today.    Pregnancy notable for  - Chronic hypertension  - Prior history of IUGR, vasa previa, and focal placenta accreta    O:  BP (!) 130/90   Pulse 80   Wt 87.1 kg (192 lb)   LMP 2021   Breastfeeding No   BMI 29.63 kg/m   Weight gain: 8.618 kg (19 lb)    Gen: Well-appearing, NAD  Cardiovascular: Regular rate, well perfused  Respiratory: Breathing comfortably on room air  Abdomen: Gravid, soft, non-distended, non-tender,  BPM  Pelvic exam deferred    A/P:  Rosemarie Johnson is a 32 year old  at 22w4d by LMP c/w 7w4d US, here for return OB visit.    Prenatal care  UTD.    28 wk labs at next visit.   Needs  TDAP in 3rd tri   Rh+, rubella immune, infectious labs within normal limits    History of FGR:   - blood pressure well controlled w/ 60mg procardia Qday- recently increased.   - HELLP labs previously wnl  - Continue daily aspirin  - Appropriate weight  gain     Other issues  Patient endorses constipation and mild round ligament pain. Last BM 3-4 days ago. Encouraged OTC miralax, patient verbalizes acknowledgement of this.    Partner's name: Shun  [x ] NOB folder  [x ] Dating LMP c/w US @ 7w 2d  [x ] 3/7/22- First tri screen done, NIPT  [ ] AFPno  [x ] Fetal anatomy US ordered  [X] Rubella and varicella immune  [X] Hep B immune  [na ] Pap last 9/2020 NILM w/ neg HPV Due: 2025  [x ] Started ASA-- plan at 12w  x[ ] NO  plan utox in labor   [x ] COVID vaccine completed + booster  _____________________________________  [ ] EOB folder  [ ] PP Contraception plan: If tubal,consent date:  [ ] Labor plans:  [ ] :  [ ] Infant feeding plan  [ ] FLU shot  [ ] TDAP given  [ ] Rhogam if needed, date:  [ ] TOLAC consent done  [ ] Waterbirth declines, consent done  [ ] GCT, passed  ________________________________________  [ ] OTC PP meds sent  [ ] Planning CS-ERAS pkt    Pap and HPV nml/neg 10/2020      Return to clinic in 4 weeks. Discussed return precautions.    Seen and staffed with my attending, Dr. Pinto, who agrees with the above assessment and plan.    Dequan Carty, MS3  Obstetrics, Gynecology, and Women's Health    The above patient was seen and evaluated with the medical student who acted as my scribe for the above note. Agree with note, changes made as appropriate.    Tabitha Pinto MD        Again, thank you for allowing me to participate in the care of your patient.      Sincerely,    Tabitha Pinto MD

## 2022-05-18 NOTE — PROGRESS NOTES
[facility]  Return OB Visit    S:   Denies vaginal bleeding, vaginal discharge, LOF, contractions.  Reports good fetal movement. Denies headache, vision changes, RUQ pain, chest pain, SOB. She does report mild round ligament pain along with constipation and bloating. Her last BM was a few days ago. She notes that her blood pressures have been in the low 120's systolic for the past few days, and she has been diligently taking her procardia, aspirin, and prenatal vitamins.     Her work has been somewhat stressful lately, though she denies any significant anxiety or sleep deficits. She inquires about scheduling all of her 4 week follow-up visits at the same time so she will have to take less time off work. She has no other concerns today.    Pregnancy notable for  - Chronic hypertension  - Prior history of IUGR, vasa previa, and focal placenta accreta  - history of high transverse c/s .  O:  BP (!) 130/90   Pulse 80   Wt 87.1 kg (192 lb)   LMP 2021   Breastfeeding No   BMI 29.63 kg/m   Weight gain: 8.618 kg (19 lb)    Gen: Well-appearing, NAD  Cardiovascular: Regular rate, well perfused  Respiratory: Breathing comfortably on room air  Abdomen: Gravid, soft, non-distended, non-tender,  BPM  Pelvic exam deferred    A/P:  Rosemarie Johnson is a 32 year old  at 22w4d by LMP c/w 7w4d US, here for return OB visit.    Prenatal care  UTD.    28 wk labs at next visit.   Needs  TDAP in 3rd tri   Rh+, rubella immune, infectious labs within normal limits    History of FGR:   - blood pressure well controlled w/ 60mg procardia Qday- recently increased.   - HELLP labs previously wnl  - Continue daily aspirin  - Appropriate weight gain     Other issues  Patient endorses constipation and mild round ligament pain. Last BM 3-4 days ago. Encouraged OTC miralax, patient verbalizes acknowledgement of this.    Need to address delivery timing.  Given op note review shows Low transverse incision in the  contractile portion of the uterus, recommend no labor and delivery ~ 37 wks.      Partner's name: Shun  [x ] NOB folder  [x ] Dating LMP c/w US @ 7w 2d  [x ] 3/7/22- First tri screen done, NIPT  [ ] AFPno  [x ] Fetal anatomy US ordered  [X] Rubella and varicella immune  [X] Hep B immune  [na ] Pap last 9/2020 NILM w/ neg HPV Due: 2025  [x ] Started ASA-- plan at 12w  x[ ] NO  plan utox in labor   [x ] COVID vaccine completed + booster  _____________________________________  [ ] EOB folder  [ ] PP Contraception plan: If tubal,consent date:  [ ] Labor plans:  [ ] :  [ ] Infant feeding plan  [ ] FLU shot  [ ] TDAP given  [ ] Rhogam if needed, date:  [ ] TOLAC consent done  [ ] Waterbirth declines, consent done  [ ] GCT, passed  ________________________________________  [ ] OTC PP meds sent  [ ] Planning CS-ERAS pkt    Pap and HPV nml/neg 10/2020      Return to clinic in 4 weeks. Discussed return precautions.    Seen and staffed with my attending, Dr. Pinto, who agrees with the above assessment and plan.    Dequan Carty, MS3  Obstetrics, Gynecology, and Women's Health    The above patient was seen and evaluated with the medical student who acted as my scribe for the above note. Agree with note, changes made as appropriate.    Tabitha Pinto MD

## 2022-05-18 NOTE — NURSING NOTE
Chief Complaint   Patient presents with     Prenatal Care     MAURICE 22 weeks and 4 days   Charito Donaldson LPN

## 2022-05-23 ENCOUNTER — OFFICE VISIT (OUTPATIENT)
Dept: MATERNAL FETAL MEDICINE | Facility: CLINIC | Age: 32
End: 2022-05-23
Attending: OBSTETRICS & GYNECOLOGY
Payer: COMMERCIAL

## 2022-05-23 ENCOUNTER — HOSPITAL ENCOUNTER (OUTPATIENT)
Dept: ULTRASOUND IMAGING | Facility: CLINIC | Age: 32
Discharge: HOME | End: 2022-05-23
Attending: OBSTETRICS & GYNECOLOGY
Payer: COMMERCIAL

## 2022-05-23 DIAGNOSIS — Z87.59 HISTORY OF PRIOR PREGNANCY WITH IUGR NEWBORN: ICD-10-CM

## 2022-05-23 DIAGNOSIS — O10.919 CHRONIC HYPERTENSION AFFECTING PREGNANCY: ICD-10-CM

## 2022-05-23 DIAGNOSIS — O10.919 CHRONIC HYPERTENSION AFFECTING PREGNANCY: Primary | ICD-10-CM

## 2022-05-23 PROCEDURE — 76816 OB US FOLLOW-UP PER FETUS: CPT | Mod: 26 | Performed by: OBSTETRICS & GYNECOLOGY

## 2022-05-23 PROCEDURE — 76816 OB US FOLLOW-UP PER FETUS: CPT

## 2022-05-23 NOTE — PROGRESS NOTES
Rosemarie Johnson was seen for an ultrasound today at the Maternal-Fetal Medicine center.      For the details of the ultrasound please see the report which can be found under the imaging tab.      Laila Sellers MD  , OB/GYN  Maternal-Fetal Medicine  [email or web address]  [phone] (Main MFM Office)  [phone] or [phone] (for 24 hour MFM questions)  [phone] (Pager)

## 2022-06-19 NOTE — PROGRESS NOTES
EOB Visit 2022    S: Overall doing well.  Happy baby girl is looking well on her US today, that was very encouraging.  Patient did see Dr. Russo earlier today for BP followup and did decide to increase Nifedipine to 90 mg today as having persistent diastolic > 90s.  Patient denies any ctx/pelvic pain, VB or LOF.  + FM.  Denies any HA, vision changes, SOB, RUQ pain.  Did notice small increase in swelling recently but nothing significant.    Education completed today includes breast feeding, [facility] hand out, contraception, counting movements, signs of pre-term labor, when to present to birthplace, post partum depression, GBS, getting enough iron, vitamin K and hypertension disorders.  Birth preferences reviewed: Medicated  Labor support:    [place] Feeding plans :   Contraception planned:  Mirena IUD  The following labs were ordered today:       GCT, CBC w platelets, Vitamin D and Anti-treponema  Water birth consent form was not given.    Blood type:   Antibody Screen   Date Value Ref Range Status   2022 Negative Negative Final   Rhogam was not given.  TDAP was given.    O:  See OB Flowsheet    A/P: 31 yo  @ 27w2d presents for EOB visit  1) PNC: Rh positive, Karin negative, Infectious labs wnl. EOB labs today.  2) Genetic screening: Normal NT, Low risk NIPT, Level II US normal.  3) CHTN, History of SI preeclampsia: Increased Nifedipine to 90 mg XL daily today, follows with Dr. Russo.  Goal BP < 140/90.  Patient checking BP twice weekly at home.  On ASA for preeclampisa prophylaxis, preeclampsia precautions discussed.  Baseline HELLP labs obtained and normal, UPC 0.09.  Repeat today for 3rd trimester values. Having serial growth scans every 4 weeks.  Growth today EFW 51%ile, AC 53%ile. Plan weekly BPP at 32 weeks, ordered.   4) History of FGR: Likely related to above which was unknown in first pregnancy.  Having serial growth scans with results as above.  Had negative  APLS testing.  5) History of PHTCS: Needs delivery at 36-37 weeks,  section orders placed today for scheduling, patient prefers closer to 37 weeks if possible.  Will schedule for 22 at 37w2d per patient request.  Aware may need to be changed if concerns for rising BP or other indications for earlier delivery.  Surgical orders placed today.  6) History of PPH/focal accreta on placental pathology: No concerns on prior US this pregnancy with interface.  Does have posterior placenta with left lateral succenturiate lobe.  Plan to T&C for delivery and have uterotonics in OR at time of delivery.  7) Delivery planning: Spinal/Brerastfeed/Mirena IUD  8) Tdap today, s/p COVID x 2  9) RTC in 2 weeks for MAURICE visit, 4 weeks for Growth and MAURICE visit    Laila Ziegler MD

## 2022-06-20 ENCOUNTER — OFFICE VISIT (OUTPATIENT)
Dept: OBGYN | Facility: CLINIC | Age: 32
End: 2022-06-20
Attending: OBSTETRICS & GYNECOLOGY
Payer: COMMERCIAL

## 2022-06-20 ENCOUNTER — OFFICE VISIT (OUTPATIENT)
Dept: INTERNAL MEDICINE | Facility: CLINIC | Age: 32
End: 2022-06-20
Attending: INTERNAL MEDICINE
Payer: COMMERCIAL

## 2022-06-20 ENCOUNTER — LAB (OUTPATIENT)
Dept: LAB | Facility: CLINIC | Age: 32
End: 2022-06-20
Attending: INTERNAL MEDICINE
Payer: COMMERCIAL

## 2022-06-20 ENCOUNTER — ANCILLARY PROCEDURE (OUTPATIENT)
Dept: ULTRASOUND IMAGING | Facility: CLINIC | Age: 32
End: 2022-06-20
Attending: OBSTETRICS & GYNECOLOGY
Payer: COMMERCIAL

## 2022-06-20 VITALS
WEIGHT: 198 LBS | HEART RATE: 84 BPM | DIASTOLIC BLOOD PRESSURE: 93 MMHG | SYSTOLIC BLOOD PRESSURE: 132 MMHG | BODY MASS INDEX: 30.55 KG/M2

## 2022-06-20 VITALS
HEART RATE: 84 BPM | DIASTOLIC BLOOD PRESSURE: 93 MMHG | WEIGHT: 198 LBS | BODY MASS INDEX: 30.55 KG/M2 | SYSTOLIC BLOOD PRESSURE: 132 MMHG

## 2022-06-20 DIAGNOSIS — O09.90 HIGH-RISK PREGNANCY, UNSPECIFIED TRIMESTER: Primary | ICD-10-CM

## 2022-06-20 DIAGNOSIS — R12 HEARTBURN: Primary | ICD-10-CM

## 2022-06-20 DIAGNOSIS — O09.90 HIGH-RISK PREGNANCY, UNSPECIFIED TRIMESTER: ICD-10-CM

## 2022-06-20 DIAGNOSIS — O09.299 H/O POSTPARTUM HEMORRHAGE, CURRENTLY PREGNANT: ICD-10-CM

## 2022-06-20 DIAGNOSIS — O10.919 CHRONIC HYPERTENSION AFFECTING PREGNANCY: ICD-10-CM

## 2022-06-20 DIAGNOSIS — O09.299 HISTORY OF PLACENTA ACCRETA IN PRIOR PREGNANCY, CURRENTLY PREGNANT: ICD-10-CM

## 2022-06-20 DIAGNOSIS — Z87.59 HISTORY OF PRIOR PREGNANCY WITH IUGR NEWBORN: ICD-10-CM

## 2022-06-20 DIAGNOSIS — Z98.891 HISTORY OF CLASSICAL CESAREAN SECTION: ICD-10-CM

## 2022-06-20 LAB
ALT SERPL W P-5'-P-CCNC: 22 U/L (ref 0–50)
AST SERPL W P-5'-P-CCNC: 16 U/L (ref 0–45)
CREAT SERPL-MCNC: 0.51 MG/DL (ref 0.52–1.04)
CREAT UR-MCNC: 83 MG/DL
ERYTHROCYTE [DISTWIDTH] IN BLOOD BY AUTOMATED COUNT: 12.9 % (ref 10–15)
GFR SERPL CREATININE-BSD FRML MDRD: >90 ML/MIN/1.73M2
GLUCOSE 1H P 50 G GLC PO SERPL-MCNC: 122 MG/DL (ref 70–129)
HCT VFR BLD AUTO: 37.6 % (ref 35–47)
HGB BLD-MCNC: 12.7 G/DL (ref 11.7–15.7)
MCH RBC QN AUTO: 29.1 PG (ref 26.5–33)
MCHC RBC AUTO-ENTMCNC: 33.8 G/DL (ref 31.5–36.5)
MCV RBC AUTO: 86 FL (ref 78–100)
PLATELET # BLD AUTO: 196 10E3/UL (ref 150–450)
PROT UR-MCNC: 0.11 G/L
PROT/CREAT 24H UR: 0.13 G/G CR (ref 0–0.2)
RBC # BLD AUTO: 4.36 10E6/UL (ref 3.8–5.2)
T PALLIDUM AB SER QL: NONREACTIVE
WBC # BLD AUTO: 12.6 10E3/UL (ref 4–11)

## 2022-06-20 PROCEDURE — 82950 GLUCOSE TEST: CPT

## 2022-06-20 PROCEDURE — 76816 OB US FOLLOW-UP PER FETUS: CPT | Mod: 26 | Performed by: OBSTETRICS & GYNECOLOGY

## 2022-06-20 PROCEDURE — 84460 ALANINE AMINO (ALT) (SGPT): CPT | Performed by: OBSTETRICS & GYNECOLOGY

## 2022-06-20 PROCEDURE — 250N000011 HC RX IP 250 OP 636

## 2022-06-20 PROCEDURE — 76816 OB US FOLLOW-UP PER FETUS: CPT

## 2022-06-20 PROCEDURE — 86780 TREPONEMA PALLIDUM: CPT

## 2022-06-20 PROCEDURE — 85014 HEMATOCRIT: CPT

## 2022-06-20 PROCEDURE — 36415 COLL VENOUS BLD VENIPUNCTURE: CPT

## 2022-06-20 PROCEDURE — G0463 HOSPITAL OUTPT CLINIC VISIT: HCPCS

## 2022-06-20 PROCEDURE — 84156 ASSAY OF PROTEIN URINE: CPT | Performed by: OBSTETRICS & GYNECOLOGY

## 2022-06-20 PROCEDURE — 90715 TDAP VACCINE 7 YRS/> IM: CPT

## 2022-06-20 PROCEDURE — 99207 PR PRENATAL VISIT: CPT | Performed by: OBSTETRICS & GYNECOLOGY

## 2022-06-20 PROCEDURE — 82565 ASSAY OF CREATININE: CPT | Performed by: OBSTETRICS & GYNECOLOGY

## 2022-06-20 PROCEDURE — 90471 IMMUNIZATION ADMIN: CPT

## 2022-06-20 PROCEDURE — 84450 TRANSFERASE (AST) (SGOT): CPT | Performed by: OBSTETRICS & GYNECOLOGY

## 2022-06-20 PROCEDURE — 99214 OFFICE O/P EST MOD 30 MIN: CPT | Performed by: INTERNAL MEDICINE

## 2022-06-20 RX ORDER — MISOPROSTOL 200 UG/1
800 TABLET ORAL
Status: CANCELLED | OUTPATIENT
Start: 2022-06-20

## 2022-06-20 RX ORDER — CITRIC ACID/SODIUM CITRATE 334-500MG
30 SOLUTION, ORAL ORAL
Status: CANCELLED | OUTPATIENT
Start: 2022-06-20

## 2022-06-20 RX ORDER — LIDOCAINE 40 MG/G
CREAM TOPICAL
Status: CANCELLED | OUTPATIENT
Start: 2022-06-20

## 2022-06-20 RX ORDER — OXYTOCIN 10 [USP'U]/ML
10 INJECTION, SOLUTION INTRAMUSCULAR; INTRAVENOUS
Status: CANCELLED | OUTPATIENT
Start: 2022-06-20

## 2022-06-20 RX ORDER — FAMOTIDINE 40 MG/1
40 TABLET, FILM COATED ORAL
Qty: 60 TABLET | Refills: 3 | Status: ON HOLD | OUTPATIENT
Start: 2022-06-20 | End: 2022-07-30

## 2022-06-20 RX ORDER — NIFEDIPINE 90 MG/1
90 TABLET, EXTENDED RELEASE ORAL DAILY
Qty: 30 TABLET | Refills: 3 | Status: ON HOLD | OUTPATIENT
Start: 2022-06-20 | End: 2022-07-30

## 2022-06-20 RX ORDER — ACETAMINOPHEN 325 MG/1
975 TABLET ORAL ONCE
Status: CANCELLED | OUTPATIENT
Start: 2022-06-20 | End: 2022-06-20

## 2022-06-20 RX ORDER — CARBOPROST TROMETHAMINE 250 UG/ML
250 INJECTION, SOLUTION INTRAMUSCULAR
Status: CANCELLED | OUTPATIENT
Start: 2022-06-20

## 2022-06-20 RX ORDER — TRANEXAMIC ACID 10 MG/ML
1 INJECTION, SOLUTION INTRAVENOUS EVERY 30 MIN PRN
Status: CANCELLED | OUTPATIENT
Start: 2022-06-20

## 2022-06-20 RX ORDER — OXYTOCIN/0.9 % SODIUM CHLORIDE 30/500 ML
340 PLASTIC BAG, INJECTION (ML) INTRAVENOUS CONTINUOUS PRN
Status: CANCELLED | OUTPATIENT
Start: 2022-06-20

## 2022-06-20 RX ORDER — CEFAZOLIN SODIUM 2 G/100ML
2 INJECTION, SOLUTION INTRAVENOUS
Status: CANCELLED | OUTPATIENT
Start: 2022-06-20

## 2022-06-20 RX ORDER — OXYTOCIN/0.9 % SODIUM CHLORIDE 30/500 ML
100-340 PLASTIC BAG, INJECTION (ML) INTRAVENOUS CONTINUOUS PRN
Status: CANCELLED | OUTPATIENT
Start: 2022-06-20

## 2022-06-20 RX ORDER — CEFAZOLIN SODIUM 2 G/100ML
2 INJECTION, SOLUTION INTRAVENOUS SEE ADMIN INSTRUCTIONS
Status: CANCELLED | OUTPATIENT
Start: 2022-06-20

## 2022-06-20 RX ORDER — SODIUM CHLORIDE, SODIUM LACTATE, POTASSIUM CHLORIDE, CALCIUM CHLORIDE 600; 310; 30; 20 MG/100ML; MG/100ML; MG/100ML; MG/100ML
INJECTION, SOLUTION INTRAVENOUS CONTINUOUS
Status: CANCELLED | OUTPATIENT
Start: 2022-06-20

## 2022-06-20 RX ORDER — MISOPROSTOL 200 UG/1
400 TABLET ORAL
Status: CANCELLED | OUTPATIENT
Start: 2022-06-20

## 2022-06-20 ASSESSMENT — PAIN SCALES - GENERAL: PAINLEVEL: NO PAIN (0)

## 2022-06-20 NOTE — LETTER
6/20/2022       RE: Rosemarie Johnson  [address] [place] [address]     Dear Colleague,    Thank you for referring your patient, Rosemarie Johnson, to the [facility]. Please see a copy of my visit note below.    Chief Complaint   Patient presents with     RECHECK     OB 27 weeks and 2 days , B/P check   Charito Donaldson LPN      Assessment & Plan     Heartburn  Discussed management of heartburn with patient.  Recommend starting famotidine once daily at night on a consistent basis.  May increase the dose to twice daily if relief of heartburn is not sufficient.  Patient is in agreement with the plan.  - famotidine (PEPCID) 40 MG tablet; Take 1 tablet (40 mg) by mouth nightly as needed for heartburn    Chronic hypertension affecting pregnancy  Blood pressure is elevated today above goal of under 140/90.  Recommend increasing the dose of nifedipine to 90 mg daily.  Patient was also advised on the need for evaluation for preeclampsia.  Case discussed with Dr. Ziegler who ordered blood work for the patient today.  We will follow-up in approximately 1 week for recheck of blood pressure.  Patient was instructed on signs and symptoms of preeclampsia as well as the need for urgent attention if she has any of the symptoms.  She was encouraged to continue with home blood pressure monitoring.  - NIFEdipine ER OSMOTIC (PROCARDIA XL) 90 MG 24 hr tablet; Take 1 tablet (90 mg) by mouth daily      I spent a total of 30 minutes on the day of the visit.   Time spent doing chart review, history and exam, documentation and further activities per the note           Return in about 1 week (around 6/27/2022).    Marti Russo MD  [facility]    Martha Houston is a 32 year old, presenting for the following health issues:  RECHECK (OB 27 weeks and 2 days , B/P check)      HPI       Patient is here  for follow-up on hypertension and pregnancy.  She reports that she has recently traveled to [place] with her .  She had some lower extremity edema over the weekend.  She denies headaches, vision changes, right upper quadrant pain or radiation of pain into the right shoulder.  She has been checking her blood pressure at home intermittently and reports that it has been within about the same range, 130/90 on several occasions.  She is currently on nifedipine extended release 60 mg once daily.    Patient also reports that she has noticed worsening of heartburn over the last several weeks.  She has been taking Tums.  However, she reports that she does feel nauseous after meals, mostly in the evening.  She also reports that she is often waking up at night with significant chest pain and heartburn.    Review of Systems   Constitutional, HEENT, cardiovascular, pulmonary, GI, , musculoskeletal, neuro, skin, endocrine and psych systems are negative, except as otherwise noted.      Objective    BP (!) 132/93   Pulse 84   Wt 89.8 kg (198 lb)   LMP 12/11/2021   Breastfeeding No   BMI 30.55 kg/m    Body mass index is 30.55 kg/m .  Physical Exam   GENERAL: healthy, alert and no distress  EYES: Eyes grossly normal to inspection, PERRL and conjunctivae and sclerae normal  RESP: normal effort, no wheezing  CV: regular rates and rhythm  MS: trace edema - ankles                    .  ..      Again, thank you for allowing me to participate in the care of your patient.      Sincerely,    Marti Russo MD

## 2022-06-20 NOTE — LETTER
2022       RE: Rosemarie Johnson  [address] [place] [address]     Dear Colleague,    Thank you for referring your patient, Rosemarie Johnson, to the [facility] [place] at [facility]. Please see a copy of my visit note below.    EOB Visit 2022    S: Overall doing well.  Happy baby girl is looking well on her US today, that was very encouraging.  Patient did see Dr. Russo earlier today for BP followup and did decide to increase Nifedipine to 90 mg today as having persistent diastolic > 90s.  Patient denies any ctx/pelvic pain, VB or LOF.  + FM.  Denies any HA, vision changes, SOB, RUQ pain.  Did notice small increase in swelling recently but nothing significant.    Education completed today includes breast feeding, [facility] hand out, contraception, counting movements, signs of pre-term labor, when to present to birthplace, post partum depression, GBS, getting enough iron, vitamin K and hypertension disorders.  Birth preferences reviewed: Medicated  Labor support:    [place] Feeding plans :   Contraception planned:  Mirena IUD  The following labs were ordered today:       GCT, CBC w platelets, Vitamin D and Anti-treponema  Water birth consent form was not given.    Blood type:   Antibody Screen   Date Value Ref Range Status   2022 Negative Negative Final   Rhogam was not given.  TDAP was given.    O:  See OB Flowsheet    A/P: 31 yo  @ 27w2d presents for EOB visit  1) PNC: Rh positive, Karin negative, Infectious labs wnl. EOB labs today.  2) Genetic screening: Normal NT, Low risk NIPT, Level II US normal.  3) CHTN, History of SI preeclampsia: Increased Nifedipine to 90 mg XL daily today, follows with Dr. Russo.  Goal BP < 140/90.  Patient checking BP twice weekly at home.  On ASA for preeclampisa prophylaxis, preeclampsia precautions discussed.  Baseline HELLP labs obtained and normal,  UPC 0.09.  Repeat today for 3rd trimester values. Having serial growth scans every 4 weeks.  Growth today EFW 51%ile, AC 53%ile. Plan weekly BPP at 32 weeks, ordered.   4) History of FGR: Likely related to above which was unknown in first pregnancy.  Having serial growth scans with results as above.  Had negative APLS testing.  5) History of PHTCS: Needs delivery at 36-37 weeks,  section orders placed today for scheduling, patient prefers closer to 37 weeks if possible.  Will schedule for 22 at 37w2d per patient request.  Aware may need to be changed if concerns for rising BP or other indications for earlier delivery.  Surgical orders placed today.  6) History of PPH/focal accreta on placental pathology: No concerns on prior US this pregnancy with interface.  Does have posterior placenta with left lateral succenturiate lobe.  Plan to T&C for delivery and have uterotonics in OR at time of delivery.  7) Delivery planning: Spinal/Brerastfeed/Mirena IUD  8) Tdap today, s/p COVID x 2  9) RTC in 2 weeks for MAURICE visit, 4 weeks for Growth and MAURICE visit    Laila Ziegler MD      Again, thank you for allowing me to participate in the care of your patient.      Sincerely,    Laila Ziegler MD

## 2022-06-20 NOTE — PROGRESS NOTES
Assessment & Plan     Heartburn  Discussed management of heartburn with patient.  Recommend starting famotidine once daily at night on a consistent basis.  May increase the dose to twice daily if relief of heartburn is not sufficient.  Patient is in agreement with the plan.  - famotidine (PEPCID) 40 MG tablet; Take 1 tablet (40 mg) by mouth nightly as needed for heartburn    Chronic hypertension affecting pregnancy  Blood pressure is elevated today above goal of under 140/90.  Recommend increasing the dose of nifedipine to 90 mg daily.  Patient was also advised on the need for evaluation for preeclampsia.  Case discussed with Dr. Ziegler who ordered blood work for the patient today.  We will follow-up in approximately 1 week for recheck of blood pressure.  Patient was instructed on signs and symptoms of preeclampsia as well as the need for urgent attention if she has any of the symptoms.  She was encouraged to continue with home blood pressure monitoring.  - NIFEdipine ER OSMOTIC (PROCARDIA XL) 90 MG 24 hr tablet; Take 1 tablet (90 mg) by mouth daily      I spent a total of 30 minutes on the day of the visit.   Time spent doing chart review, history and exam, documentation and further activities per the note           Return in about 1 week (around 6/27/2022).    Marti Russo MD  [facility] JAYDEN Houston is a 32 year old, presenting for the following health issues:  RECHECK (OB 27 weeks and 2 days , B/P check)      HPI       Patient is here for follow-up on hypertension and pregnancy.  She reports that she has recently traveled to [place] with her .  She had some lower extremity edema over the weekend.  She denies headaches, vision changes, right upper quadrant pain or radiation of pain into the right shoulder.  She has been checking her blood pressure at home intermittently and reports that it has been within about the same range, 130/90 on several  occasions.  She is currently on nifedipine extended release 60 mg once daily.    Patient also reports that she has noticed worsening of heartburn over the last several weeks.  She has been taking Tums.  However, she reports that she does feel nauseous after meals, mostly in the evening.  She also reports that she is often waking up at night with significant chest pain and heartburn.    Review of Systems   Constitutional, HEENT, cardiovascular, pulmonary, GI, , musculoskeletal, neuro, skin, endocrine and psych systems are negative, except as otherwise noted.      Objective    BP (!) 132/93   Pulse 84   Wt 89.8 kg (198 lb)   LMP 12/11/2021   Breastfeeding No   BMI 30.55 kg/m    Body mass index is 30.55 kg/m .  Physical Exam   GENERAL: healthy, alert and no distress  EYES: Eyes grossly normal to inspection, PERRL and conjunctivae and sclerae normal  RESP: normal effort, no wheezing  CV: regular rates and rhythm  MS: trace edema - ankles                    .  ..

## 2022-06-20 NOTE — PROGRESS NOTES
Chief Complaint   Patient presents with     RECHECK     OB 27 weeks and 2 days , B/P check   Charito Donaldson LPN

## 2022-06-20 NOTE — LETTER
Date:June 21, 2022      Provider requested that no letter be sent. Do not send.       [facility]

## 2022-06-20 NOTE — NURSING NOTE
Chief Complaint   Patient presents with     Prenatal Care     MAURICE 27 weeks and 2 days   Charito Donaldson LPN

## 2022-06-20 NOTE — LETTER
Date:June 21, 2022      Provider requested that no letter be sent. Do not send.       [facility]

## 2022-06-27 ENCOUNTER — TELEPHONE (OUTPATIENT)
Dept: INTERNAL MEDICINE | Facility: CLINIC | Age: 32
End: 2022-06-27

## 2022-06-27 NOTE — TELEPHONE ENCOUNTER
Called and spoke with Rosemarie. She reports vomiting and diarrhea starting overnight. Patient states this woke her up during the night. Patient took a Covid test that had a negative result. Patient reports positive fetal movement. Patient states this seems like a stomach bug, and does not think it is heartburn related. Blood pressure a couple of days ago was 130/90. Patient states she will check her blood pressure and send the result via [employer].     Patient encouraged to rest, stay hydrated, and let us know if anything worsens or changes. Patient verbalized understanding and agreement to plan.

## 2022-06-27 NOTE — TELEPHONE ENCOUNTER
[facility] Call Center    Phone Message    May a detailed message be left on voicemail: yes     Reason for Call: Patient is calling to cancel her BP check appt today with Dr. Russo, she states she's too ill to come in but wonders if she can take her BP at home and call it in to Dr. Russo.  Please reach out to patient to discuss.  Thank you.    Action Taken: Other: [facility]    Travel Screening: Not Applicable

## 2022-07-06 ENCOUNTER — OFFICE VISIT (OUTPATIENT)
Dept: OBGYN | Facility: CLINIC | Age: 32
End: 2022-07-06
Payer: COMMERCIAL

## 2022-07-06 VITALS
SYSTOLIC BLOOD PRESSURE: 142 MMHG | WEIGHT: 201 LBS | HEART RATE: 91 BPM | BODY MASS INDEX: 31.02 KG/M2 | DIASTOLIC BLOOD PRESSURE: 99 MMHG

## 2022-07-06 DIAGNOSIS — Z3A.29 29 WEEKS GESTATION OF PREGNANCY: ICD-10-CM

## 2022-07-06 DIAGNOSIS — O10.919 CHRONIC HYPERTENSION AFFECTING PREGNANCY: Primary | ICD-10-CM

## 2022-07-06 PROCEDURE — 99207 PR PRENATAL VISIT: CPT | Mod: GE | Performed by: OBSTETRICS & GYNECOLOGY

## 2022-07-06 PROCEDURE — G0463 HOSPITAL OUTPT CLINIC VISIT: HCPCS

## 2022-07-06 ASSESSMENT — PAIN SCALES - GENERAL: PAINLEVEL: NO PAIN (0)

## 2022-07-06 NOTE — PROGRESS NOTES
MAURICE Visit 2022    S: Doing okay. Harder to adjust to this increase in nifedipine dosing compared to previous. She has had more headaches in the morning, including one that she had to take Tylenol to resolve. She denies vision changes, SOB, chest pain, RUQ pain. She reports gradually worsening lower extremity edema. Was not able to make it to her most recent scheduled appointment with Dr. Gray due to illness. She is planning to follow up with Dr. Gray early next week. She has been monitoring her blood pressure at home and has had a couple of DBPs>100s but largely 140s/90s. She denies ctx, LOF, VB. +FM.     O:  BP (!) 142/99   Pulse 91   Wt 91.2 kg (201 lb)   LMP 2021   Breastfeeding No   BMI 31.02 kg/m    BPs in clinic today: 147/103 > 143/96 > 136/99 > 142/99    See OB flowsheet       A/P: 31 yo  @ 29w4d presents for MAURICE visit  1) PNC: Rh positive, Karin negative, Infectious labs wnl. Third trimester labs wnl.   2) Genetic screening: Normal NT, Low risk NIPS, Level II US normal.  3) CHTN, History of SI preeclampsia: Continue Procardia XL 90 mg daily, follows with Dr. Russo. Goal BP < 140/90.  BP slightly above goal. Discussed diminishing returns with increasing Procardia XL further to 120 mg and given patient's worsening side effects, I would favor addition of labetalol 200 mg BID. Discussed option to begin today, and patient prefers to follow up with Dr. Russo early next week. Reviewed that it would be reasonable to repeat HELLP labs and UPC today as BPs are continuing to up trend despite increasing dose of Procardia XL. Patient declined today. Recommend repeat HELLP labs, UPC  at next visit. Patient checking BP twice weekly at home.  On ASA for preeclampisa prophylaxis, preeclampsia precautions discussed.  Baseline HELLP labs obtained and normal, UPC 0.09.  Repeat today for 3rd trimester values. Continue serial growth USevery 4 weeks.  Last growth  EFW 52%ile, AC 53%ile. Next  growth US . Plan weekly BPP at 32 weeks.   4) History of FGR: Likely related to above which was unknown in first pregnancy.  Having serial growth scans with results as above.  Had negative APLS testing.  5) History of PHTCS iso vasa previa: Needs delivery at 36-37 weeks. Repeat  section schedule for 22 at 37w2d per patient request.  Aware may need to be changed if concerns for rising BP or other indications for earlier delivery.  Pre-op orders placed previously.   6) History of PPH/focal accreta on placental pathology: No concerns on prior US this pregnancy with interface.  Does have posterior placenta with left lateral succenturiate lobe.  Plan to T&C for delivery and have uterotonics in OR at time of delivery.  7) Delivery planning: Spinal/Brerastfeed/Mirena IUD  8) s/p Tdap, s/p COVID x3    Reviewed kick counts, s/s of preeclampsia. RTC in 2 weeks for MAURICE visit and growth US.     Discussed with Dr. Jace Cooper MD  Ob/Gyn Resident, PGY-4  2022 3:06 PM    The Patient was seen in [facility] by MARIFER COOPER.  I reviewed the history & exam. Assessment and plan were jointly made.    Aniya Mccormick MD

## 2022-07-06 NOTE — NURSING NOTE
Chief Complaint   Patient presents with     Prenatal Care     MAURICE 29 weeks and 4 days   Charito Donaldson LPN

## 2022-07-19 ENCOUNTER — TELEPHONE (OUTPATIENT)
Dept: OBGYN | Facility: CLINIC | Age: 32
End: 2022-07-19

## 2022-07-19 NOTE — TELEPHONE ENCOUNTER
Received fax for disability paperwork to be completed.     Forms placed in Dr. Alvarez's basket in nurse triage area.     - Nicole Guzman   Clinic Services Assistant

## 2022-07-21 ENCOUNTER — LAB (OUTPATIENT)
Dept: LAB | Facility: CLINIC | Age: 32
End: 2022-07-21
Attending: OBSTETRICS & GYNECOLOGY
Payer: COMMERCIAL

## 2022-07-21 ENCOUNTER — OFFICE VISIT (OUTPATIENT)
Dept: OBGYN | Facility: CLINIC | Age: 32
End: 2022-07-21
Attending: OBSTETRICS & GYNECOLOGY
Payer: COMMERCIAL

## 2022-07-21 ENCOUNTER — ANCILLARY PROCEDURE (OUTPATIENT)
Dept: ULTRASOUND IMAGING | Facility: CLINIC | Age: 32
End: 2022-07-21
Attending: OBSTETRICS & GYNECOLOGY
Payer: COMMERCIAL

## 2022-07-21 VITALS
SYSTOLIC BLOOD PRESSURE: 140 MMHG | WEIGHT: 203 LBS | DIASTOLIC BLOOD PRESSURE: 97 MMHG | BODY MASS INDEX: 31.33 KG/M2 | HEART RATE: 101 BPM

## 2022-07-21 DIAGNOSIS — O09.299 H/O POSTPARTUM HEMORRHAGE, CURRENTLY PREGNANT: ICD-10-CM

## 2022-07-21 DIAGNOSIS — O09.893 SUPERVISION OF OTHER HIGH RISK PREGNANCIES, THIRD TRIMESTER: Primary | ICD-10-CM

## 2022-07-21 DIAGNOSIS — Z98.891 HISTORY OF CLASSICAL CESAREAN SECTION: ICD-10-CM

## 2022-07-21 DIAGNOSIS — O10.919 HTN IN PREGNANCY, CHRONIC: ICD-10-CM

## 2022-07-21 DIAGNOSIS — O09.299 HISTORY OF PLACENTA ACCRETA IN PRIOR PREGNANCY, CURRENTLY PREGNANT: ICD-10-CM

## 2022-07-21 DIAGNOSIS — O10.919 CHRONIC HYPERTENSION AFFECTING PREGNANCY: ICD-10-CM

## 2022-07-21 DIAGNOSIS — Z87.59 HISTORY OF PRIOR PREGNANCY WITH IUGR NEWBORN: ICD-10-CM

## 2022-07-21 LAB
ALT SERPL W P-5'-P-CCNC: 28 U/L (ref 0–50)
AST SERPL W P-5'-P-CCNC: 21 U/L (ref 0–45)
CREAT SERPL-MCNC: 0.52 MG/DL (ref 0.52–1.04)
CREAT UR-MCNC: 111 MG/DL
ERYTHROCYTE [DISTWIDTH] IN BLOOD BY AUTOMATED COUNT: 13 % (ref 10–15)
GFR SERPL CREATININE-BSD FRML MDRD: >90 ML/MIN/1.73M2
HCT VFR BLD AUTO: 40 % (ref 35–47)
HGB BLD-MCNC: 13.2 G/DL (ref 11.7–15.7)
MCH RBC QN AUTO: 28.6 PG (ref 26.5–33)
MCHC RBC AUTO-ENTMCNC: 33 G/DL (ref 31.5–36.5)
MCV RBC AUTO: 87 FL (ref 78–100)
PLATELET # BLD AUTO: 215 10E3/UL (ref 150–450)
PROT UR-MCNC: 0.11 G/L
PROT/CREAT 24H UR: 0.1 G/G CR (ref 0–0.2)
RBC # BLD AUTO: 4.61 10E6/UL (ref 3.8–5.2)
WBC # BLD AUTO: 13.9 10E3/UL (ref 4–11)

## 2022-07-21 PROCEDURE — 76816 OB US FOLLOW-UP PER FETUS: CPT

## 2022-07-21 PROCEDURE — 84156 ASSAY OF PROTEIN URINE: CPT

## 2022-07-21 PROCEDURE — 84460 ALANINE AMINO (ALT) (SGPT): CPT

## 2022-07-21 PROCEDURE — 82565 ASSAY OF CREATININE: CPT

## 2022-07-21 PROCEDURE — 85014 HEMATOCRIT: CPT

## 2022-07-21 PROCEDURE — 76816 OB US FOLLOW-UP PER FETUS: CPT | Mod: 26 | Performed by: OBSTETRICS & GYNECOLOGY

## 2022-07-21 PROCEDURE — 36415 COLL VENOUS BLD VENIPUNCTURE: CPT

## 2022-07-21 PROCEDURE — 99207 PR PRENATAL VISIT: CPT | Performed by: OBSTETRICS & GYNECOLOGY

## 2022-07-21 PROCEDURE — 84450 TRANSFERASE (AST) (SGOT): CPT

## 2022-07-21 PROCEDURE — G0463 HOSPITAL OUTPT CLINIC VISIT: HCPCS

## 2022-07-21 NOTE — PROGRESS NOTES
Chief Complaint   Patient presents with     Prenatal Care     MAURICE 31 weeks 5 days   Charito Donaldson LPN

## 2022-07-21 NOTE — LETTER
2022       RE: Rosemarie Johnson  [address] [place] [address]     Dear Colleague,    Thank you for referring your patient, Rosemarie Johnson, to the [facility] [place] at [facility]. Please see a copy of my visit note below.    Chief Complaint   Patient presents with     Prenatal Care     MAURICE 31 weeks 5 days   Charito Donaldson LPN    S:  Doing well overall-has made it farther this pregnancy than last.  Blood pressures at home remain at or below her goal of 140/100. No pre-e symptoms.  Planning a long weekend up [place] with friends this weekend.      O: see flow    Growth ultrasound today-EFW 20.6%ile, AC 14.3, MVP 6.54    A:  31 y/o  at 31+5 weeks, doing well.  Pregnancy complicated by chronic HTN on nifedipine 90 mg daily, history of superimposed pre-e with delivery by high transverse c/s at 30 weeks (also vasa previa, FGR, PPH with focal accreta on placenta path)    P:  Continue serial growth assessment, weekly BPPs to start next week at 32 weeks.  Will repeat pre-eclampsia labs today in anticipation of a trip this weekend given that the hospital in the town where she is going would not be able to care for her at this gestational age.      Keyanna Alvarez MD, FACOG

## 2022-07-21 NOTE — PROGRESS NOTES
S:  Doing well overall-has made it farther this pregnancy than last.  Blood pressures at home remain at or below her goal of 140/100. No pre-e symptoms.  [place]      O: see flow    Growth ultrasound today-EFW 20.6%ile, AC 14.3, MVP 6.54    A:  33 y/o  at 31+5 weeks, doing well.  Pregnancy complicated by chronic HTN on nifedipine 90 mg daily, history of superimposed pre-e with delivery by high transverse c/s at 30 weeks (also vasa previa, FGR, PPH with focal accreta on placenta path)    P:  Continue serial growth assessment, weekly BPPs to start next week at 32 weeks.  Will repeat pre-eclampsia labs today in anticipation of a trip this weekend given that the hospital in the town where she is going would not be able to care for her at this gestational age.      Keyanna Alvarez MD, FACOG

## 2022-07-28 ENCOUNTER — ALLIED HEALTH/NURSE VISIT (OUTPATIENT)
Dept: OBGYN | Facility: CLINIC | Age: 32
End: 2022-07-28
Attending: MIDWIFE
Payer: COMMERCIAL

## 2022-07-28 ENCOUNTER — ANCILLARY PROCEDURE (OUTPATIENT)
Dept: ULTRASOUND IMAGING | Facility: CLINIC | Age: 32
End: 2022-07-28
Attending: OBSTETRICS & GYNECOLOGY
Payer: COMMERCIAL

## 2022-07-28 ENCOUNTER — TELEPHONE (OUTPATIENT)
Dept: OBGYN | Facility: CLINIC | Age: 32
End: 2022-07-28

## 2022-07-28 ENCOUNTER — HOSPITAL ENCOUNTER (INPATIENT)
Facility: CLINIC | Age: 32
LOS: 2 days | Discharge: HOME | End: 2022-07-30
Attending: OBSTETRICS & GYNECOLOGY | Admitting: OBSTETRICS & GYNECOLOGY
Payer: COMMERCIAL

## 2022-07-28 VITALS — DIASTOLIC BLOOD PRESSURE: 105 MMHG | SYSTOLIC BLOOD PRESSURE: 149 MMHG

## 2022-07-28 DIAGNOSIS — O10.919 CHRONIC HYPERTENSION AFFECTING PREGNANCY: Primary | ICD-10-CM

## 2022-07-28 DIAGNOSIS — K21.00 GASTROESOPHAGEAL REFLUX DISEASE WITH ESOPHAGITIS WITHOUT HEMORRHAGE: ICD-10-CM

## 2022-07-28 DIAGNOSIS — O10.919 HTN IN PREGNANCY, CHRONIC: Primary | ICD-10-CM

## 2022-07-28 PROBLEM — Z36.89 ENCOUNTER FOR TRIAGE IN PREGNANT PATIENT: Status: ACTIVE | Noted: 2022-07-28

## 2022-07-28 LAB
ABO/RH(D): NORMAL
ALT SERPL W P-5'-P-CCNC: 33 U/L (ref 0–50)
ANTIBODY SCREEN: NEGATIVE
AST SERPL W P-5'-P-CCNC: 21 U/L (ref 0–45)
CREAT SERPL-MCNC: 0.51 MG/DL (ref 0.52–1.04)
CREAT UR-MCNC: 39 MG/DL
ERYTHROCYTE [DISTWIDTH] IN BLOOD BY AUTOMATED COUNT: 13 % (ref 10–15)
GFR SERPL CREATININE-BSD FRML MDRD: >90 ML/MIN/1.73M2
HCT VFR BLD AUTO: 39.2 % (ref 35–47)
HGB BLD-MCNC: 13.3 G/DL (ref 11.7–15.7)
MCH RBC QN AUTO: 28.9 PG (ref 26.5–33)
MCHC RBC AUTO-ENTMCNC: 33.9 G/DL (ref 31.5–36.5)
MCV RBC AUTO: 85 FL (ref 78–100)
PLATELET # BLD AUTO: 219 10E3/UL (ref 150–450)
PROT UR-MCNC: 0.08 G/L
PROT/CREAT 24H UR: 0.21 G/G CR (ref 0–0.2)
RBC # BLD AUTO: 4.61 10E6/UL (ref 3.8–5.2)
SARS-COV-2 RNA RESP QL NAA+PROBE: NEGATIVE
SPECIMEN EXPIRATION DATE: NORMAL
WBC # BLD AUTO: 12.3 10E3/UL (ref 4–11)

## 2022-07-28 PROCEDURE — 99252 IP/OBS CONSLTJ NEW/EST SF 35: CPT | Performed by: OBSTETRICS & GYNECOLOGY

## 2022-07-28 PROCEDURE — 84450 TRANSFERASE (AST) (SGOT): CPT | Performed by: OBSTETRICS & GYNECOLOGY

## 2022-07-28 PROCEDURE — 82565 ASSAY OF CREATININE: CPT | Performed by: OBSTETRICS & GYNECOLOGY

## 2022-07-28 PROCEDURE — G0463 HOSPITAL OUTPT CLINIC VISIT: HCPCS | Mod: 25

## 2022-07-28 PROCEDURE — 250N000011 HC RX IP 250 OP 636

## 2022-07-28 PROCEDURE — 120N000002 HC R&B MED SURG/OB UMMC

## 2022-07-28 PROCEDURE — 87635 SARS-COV-2 COVID-19 AMP PRB: CPT | Performed by: OBSTETRICS & GYNECOLOGY

## 2022-07-28 PROCEDURE — 84156 ASSAY OF PROTEIN URINE: CPT | Performed by: OBSTETRICS & GYNECOLOGY

## 2022-07-28 PROCEDURE — C9803 HOPD COVID-19 SPEC COLLECT: HCPCS

## 2022-07-28 PROCEDURE — 86850 RBC ANTIBODY SCREEN: CPT | Performed by: OBSTETRICS & GYNECOLOGY

## 2022-07-28 PROCEDURE — 36415 COLL VENOUS BLD VENIPUNCTURE: CPT | Performed by: OBSTETRICS & GYNECOLOGY

## 2022-07-28 PROCEDURE — 250N000011 HC RX IP 250 OP 636: Performed by: OBSTETRICS & GYNECOLOGY

## 2022-07-28 PROCEDURE — 99207 PR NO BILLABLE SERVICE THIS VISIT: CPT

## 2022-07-28 PROCEDURE — 85027 COMPLETE CBC AUTOMATED: CPT | Performed by: OBSTETRICS & GYNECOLOGY

## 2022-07-28 PROCEDURE — 250N000013 HC RX MED GY IP 250 OP 250 PS 637

## 2022-07-28 PROCEDURE — 96372 THER/PROPH/DIAG INJ SC/IM: CPT

## 2022-07-28 PROCEDURE — 999N000103 HC STATISTIC NO CHARGE FACILITY FEE

## 2022-07-28 PROCEDURE — 84460 ALANINE AMINO (ALT) (SGPT): CPT | Performed by: OBSTETRICS & GYNECOLOGY

## 2022-07-28 PROCEDURE — 76819 FETAL BIOPHYS PROFIL W/O NST: CPT | Mod: 26 | Performed by: OBSTETRICS & GYNECOLOGY

## 2022-07-28 PROCEDURE — 76819 FETAL BIOPHYS PROFIL W/O NST: CPT

## 2022-07-28 RX ORDER — SODIUM CHLORIDE, SODIUM LACTATE, POTASSIUM CHLORIDE, CALCIUM CHLORIDE 600; 310; 30; 20 MG/100ML; MG/100ML; MG/100ML; MG/100ML
10-125 INJECTION, SOLUTION INTRAVENOUS CONTINUOUS
Status: DISCONTINUED | OUTPATIENT
Start: 2022-07-28 | End: 2022-07-28

## 2022-07-28 RX ORDER — CALCIUM CARBONATE 500 MG/1
500 TABLET, CHEWABLE ORAL 3 TIMES DAILY PRN
Status: DISCONTINUED | OUTPATIENT
Start: 2022-07-28 | End: 2022-07-30 | Stop reason: HOSPADM

## 2022-07-28 RX ORDER — ASPIRIN 81 MG/1
81 TABLET, CHEWABLE ORAL DAILY
Status: ON HOLD | COMMUNITY
End: 2022-08-29

## 2022-07-28 RX ORDER — FAMOTIDINE 20 MG/1
40 TABLET, FILM COATED ORAL 2 TIMES DAILY
Status: DISCONTINUED | OUTPATIENT
Start: 2022-07-28 | End: 2022-07-30 | Stop reason: HOSPADM

## 2022-07-28 RX ORDER — BETAMETHASONE SODIUM PHOSPHATE AND BETAMETHASONE ACETATE 3; 3 MG/ML; MG/ML
12 INJECTION, SUSPENSION INTRA-ARTICULAR; INTRALESIONAL; INTRAMUSCULAR; SOFT TISSUE EVERY 24 HOURS
Status: COMPLETED | OUTPATIENT
Start: 2022-07-28 | End: 2022-07-29

## 2022-07-28 RX ORDER — LORAZEPAM 2 MG/ML
2 INJECTION INTRAMUSCULAR
Status: DISCONTINUED | OUTPATIENT
Start: 2022-07-28 | End: 2022-07-30 | Stop reason: HOSPADM

## 2022-07-28 RX ORDER — NIFEDIPINE 30 MG/1
90 TABLET, EXTENDED RELEASE ORAL DAILY
Status: DISCONTINUED | OUTPATIENT
Start: 2022-07-28 | End: 2022-07-28

## 2022-07-28 RX ORDER — PROCHLORPERAZINE MALEATE 10 MG
10 TABLET ORAL EVERY 6 HOURS PRN
Status: DISCONTINUED | OUTPATIENT
Start: 2022-07-28 | End: 2022-07-30 | Stop reason: HOSPADM

## 2022-07-28 RX ORDER — LABETALOL HYDROCHLORIDE 5 MG/ML
20-80 INJECTION, SOLUTION INTRAVENOUS EVERY 10 MIN PRN
Status: DISCONTINUED | OUTPATIENT
Start: 2022-07-28 | End: 2022-07-30 | Stop reason: HOSPADM

## 2022-07-28 RX ORDER — PANTOPRAZOLE SODIUM 40 MG/1
40 TABLET, DELAYED RELEASE ORAL
Status: DISCONTINUED | OUTPATIENT
Start: 2022-07-29 | End: 2022-07-30 | Stop reason: HOSPADM

## 2022-07-28 RX ORDER — NIFEDIPINE 30 MG/1
60 TABLET, EXTENDED RELEASE ORAL EVERY 12 HOURS
Status: DISCONTINUED | OUTPATIENT
Start: 2022-07-29 | End: 2022-07-30 | Stop reason: HOSPADM

## 2022-07-28 RX ORDER — HYDROXYZINE HYDROCHLORIDE 50 MG/1
100 TABLET, FILM COATED ORAL
Status: DISCONTINUED | OUTPATIENT
Start: 2022-07-28 | End: 2022-07-30 | Stop reason: HOSPADM

## 2022-07-28 RX ORDER — LIDOCAINE 40 MG/G
CREAM TOPICAL
Status: DISCONTINUED | OUTPATIENT
Start: 2022-07-28 | End: 2022-07-30 | Stop reason: HOSPADM

## 2022-07-28 RX ORDER — MAGNESIUM SULFATE HEPTAHYDRATE 40 MG/ML
4 INJECTION, SOLUTION INTRAVENOUS
Status: DISCONTINUED | OUTPATIENT
Start: 2022-07-28 | End: 2022-07-30 | Stop reason: HOSPADM

## 2022-07-28 RX ORDER — METOCLOPRAMIDE HYDROCHLORIDE 5 MG/ML
10 INJECTION INTRAMUSCULAR; INTRAVENOUS EVERY 6 HOURS PRN
Status: DISCONTINUED | OUTPATIENT
Start: 2022-07-28 | End: 2022-07-30 | Stop reason: HOSPADM

## 2022-07-28 RX ORDER — CALCIUM CARBONATE 500 MG/1
1500 TABLET, CHEWABLE ORAL DAILY
Status: DISCONTINUED | OUTPATIENT
Start: 2022-07-28 | End: 2022-07-30 | Stop reason: HOSPADM

## 2022-07-28 RX ORDER — MAGNESIUM SULFATE HEPTAHYDRATE 40 MG/ML
2 INJECTION, SOLUTION INTRAVENOUS
Status: DISCONTINUED | OUTPATIENT
Start: 2022-07-28 | End: 2022-07-30 | Stop reason: HOSPADM

## 2022-07-28 RX ORDER — METOCLOPRAMIDE 10 MG/1
10 TABLET ORAL EVERY 6 HOURS PRN
Status: DISCONTINUED | OUTPATIENT
Start: 2022-07-28 | End: 2022-07-30 | Stop reason: HOSPADM

## 2022-07-28 RX ORDER — ONDANSETRON 4 MG/1
4 TABLET, ORALLY DISINTEGRATING ORAL EVERY 6 HOURS PRN
Status: DISCONTINUED | OUTPATIENT
Start: 2022-07-28 | End: 2022-07-30 | Stop reason: HOSPADM

## 2022-07-28 RX ORDER — MAGNESIUM SULFATE HEPTAHYDRATE 500 MG/ML
10 INJECTION, SOLUTION INTRAMUSCULAR; INTRAVENOUS
Status: DISCONTINUED | OUTPATIENT
Start: 2022-07-28 | End: 2022-07-30 | Stop reason: HOSPADM

## 2022-07-28 RX ORDER — ONDANSETRON 2 MG/ML
4 INJECTION INTRAMUSCULAR; INTRAVENOUS EVERY 6 HOURS PRN
Status: DISCONTINUED | OUTPATIENT
Start: 2022-07-28 | End: 2022-07-30 | Stop reason: HOSPADM

## 2022-07-28 RX ORDER — ACETAMINOPHEN 325 MG/1
650 TABLET ORAL EVERY 4 HOURS PRN
Status: DISCONTINUED | OUTPATIENT
Start: 2022-07-28 | End: 2022-07-30 | Stop reason: HOSPADM

## 2022-07-28 RX ORDER — LIDOCAINE 40 MG/G
CREAM TOPICAL
Status: DISCONTINUED | OUTPATIENT
Start: 2022-07-28 | End: 2022-07-28

## 2022-07-28 RX ORDER — HYDRALAZINE HYDROCHLORIDE 20 MG/ML
10 INJECTION INTRAMUSCULAR; INTRAVENOUS
Status: DISCONTINUED | OUTPATIENT
Start: 2022-07-28 | End: 2022-07-30 | Stop reason: HOSPADM

## 2022-07-28 RX ORDER — PROCHLORPERAZINE 25 MG
25 SUPPOSITORY, RECTAL RECTAL EVERY 12 HOURS PRN
Status: DISCONTINUED | OUTPATIENT
Start: 2022-07-28 | End: 2022-07-30 | Stop reason: HOSPADM

## 2022-07-28 RX ORDER — ASPIRIN 81 MG/1
81 TABLET, CHEWABLE ORAL DAILY
Status: DISCONTINUED | OUTPATIENT
Start: 2022-07-28 | End: 2022-07-30 | Stop reason: HOSPADM

## 2022-07-28 RX ORDER — PRENATAL VIT/IRON FUM/FOLIC AC 27MG-0.8MG
1 TABLET ORAL DAILY
Status: DISCONTINUED | OUTPATIENT
Start: 2022-07-28 | End: 2022-07-30 | Stop reason: HOSPADM

## 2022-07-28 RX ADMIN — ASPIRIN 81 MG CHEWABLE TABLET 81 MG: 81 TABLET CHEWABLE at 18:29

## 2022-07-28 RX ADMIN — PRENATAL VIT W/ FE FUMARATE-FA TAB 27-0.8 MG 1 TABLET: 27-0.8 TAB at 18:30

## 2022-07-28 RX ADMIN — NIFEDIPINE 90 MG: 30 TABLET, FILM COATED, EXTENDED RELEASE ORAL at 18:31

## 2022-07-28 RX ADMIN — BETAMETHASONE SODIUM PHOSPHATE AND BETAMETHASONE ACETATE 12 MG: 3; 3 INJECTION, SUSPENSION INTRA-ARTICULAR; INTRALESIONAL; INTRAMUSCULAR at 15:29

## 2022-07-28 RX ADMIN — LABETALOL HYDROCHLORIDE 20 MG: 5 INJECTION, SOLUTION INTRAVENOUS at 15:18

## 2022-07-28 RX ADMIN — LABETALOL HYDROCHLORIDE 40 MG: 5 INJECTION, SOLUTION INTRAVENOUS at 13:47

## 2022-07-28 RX ADMIN — LABETALOL HYDROCHLORIDE 20 MG: 5 INJECTION, SOLUTION INTRAVENOUS at 13:22

## 2022-07-28 RX ADMIN — FAMOTIDINE 20 MG: 20 TABLET ORAL at 17:36

## 2022-07-28 ASSESSMENT — ACTIVITIES OF DAILY LIVING (ADL)
VISION_MANAGEMENT: CONTACTS AND GLASSES
CONCENTRATING,_REMEMBERING_OR_MAKING_DECISIONS_DIFFICULTY: NO
DRESSING/BATHING_DIFFICULTY: NO
CHANGE_IN_FUNCTIONAL_STATUS_SINCE_ONSET_OF_CURRENT_ILLNESS/INJURY: NO
ADLS_ACUITY_SCORE: 20
FALL_HISTORY_WITHIN_LAST_SIX_MONTHS: NO
ADLS_ACUITY_SCORE: 20
WEAR_GLASSES_OR_BLIND: YES
TOILETING_ISSUES: NO
ADLS_ACUITY_SCORE: 20
DOING_ERRANDS_INDEPENDENTLY_DIFFICULTY: NO
DIFFICULTY_EATING/SWALLOWING: NO
ADLS_ACUITY_SCORE: 20
WALKING_OR_CLIMBING_STAIRS_DIFFICULTY: NO

## 2022-07-28 NOTE — PROVIDER NOTIFICATION
07/28/22 1352   Provider Notification   Provider Name/Title Dr Moyer   Method of Notification Electronic Page   Request Evaluate - Remote   Notification Reason Maternal Vital Sign Change     Pt had severe range BP after 20mg of labetalol dose 10 mins later, 40mg of labetalol given per protocol. /91 after dose.   72 year old female presents to ed with chief complaint of generalized rash. Patient saw dermatologist yesterday. Patient denies fever chest pain sob

## 2022-07-28 NOTE — TELEPHONE ENCOUNTER
Pt came to clinic for BPP and requested BP.  Pt BPs 148/110,151/98,148/108 with MAP of 149/105.  Discussed with Dr Sharif.  Pt was to be sent over to be evaluated.  Called Charge and on call provider to give hand off report.

## 2022-07-28 NOTE — PROVIDER NOTIFICATION
07/28/22 1319   Provider Notification   Provider Name/Title Dr Moyer   Method of Notification Electronic Page   Request Evaluate - Remote   Notification Reason Maternal Vital Sign Change   Patient had two severe range BPs 15 mins apart, 20mg IV labetalol given.

## 2022-07-28 NOTE — H&P
Antepartum History and Physical   2022  Rosemarie Johnson  [id number]      HPI: Rosemarie Johnson is a 32 year old  at 32w5d by LMP c/w 7w4d  US who presents from clinic with severe range blood pressures. Patient had BPP today in clinic which was normal and then had her blood pressures check, which was in the severe range X2. She was sent to triage for further evaluation. Patient has a history of chronic hypertension and takes 90mg Procardia, XL nightly. She took her medications last night. She notes her blood pressures have been 130-40s/90s-100s at home. She denies any HA, vision changes, chest pain, shortness of breath, fever, chills, nausea, vomiting or other systemic complaints. She denies vaginal bleeding or loss of fluid and is feeling normal fetal movement.    Her pregnancy has been complicated by:  - Chronic Hypertension, on PTA 90mg Procardia XL  - History of SI Preeclampsia, on ASA   - History of PHTCS iso Vasa Previa  - History of PTD at 30w2d  - History of FGR  - History of PPH 2/2 focal accreta  - Placenta with Left Lateral Succenturiate Lobe    OBHX:   OB History    Para Term  AB Living   2 1 0 1 0 1   SAB IAB Ectopic Multiple Live Births   0 0 0 0 1      # Outcome Date GA Lbr Slava/2nd Weight Sex Delivery Anes PTL Lv   2 Current            1  20 30w2d  0.86 kg (1 lb 14.3 oz) M IAB  N RUI       MedicalHX:   Past Medical History:   Diagnosis Date    Anemia of mother during pregnancy, delivered 3/28/2020    Formatting of this note might be different from the original. Pitx3, cytotec, hemabate, txax2 Hb 10.1 (from 13.6) Hemorrhage 1760 total     delivery delivered 3/27/2020    Last Assessment & Plan:  Formatting of this note might be different from the original. Rosemarie Johnson is a 30 year old  who was admitted at 30w1d with pregnancy complicated by chronic hypertension, vasa previa, single umbilical artery, and IUGR, 2%ile. She was admitted on 3/27 from a  scheduled ultrasound for prolonged monitoring due to decelerations in the heart rate and intermitted reversed do    Chronic hypertension     Delayed or secondary postpartum hemorrhage 3/28/2020    Formatting of this note might be different from the original. 1003cc after expressed clots. cytotec 800, oxytocinx3    High-risk pregnancy, unspecified trimester 1/31/2022    PCOS (polycystic ovarian syndrome)     dx age 12    Placenta accreta 4/16/2020    Formatting of this note might be different from the original. Pathology after delivery shows focal placenta accreta.  Patient currently asymptomatic    Vasa previa 1/30/2020       SurgicalHX:   Past Surgical History:   Procedure Laterality Date    C ORAL SURGERY SINGLE TOOTH      removal of impacted tooth    WISDOM TOOTH EXTRACTION         Medications:   No current facility-administered medications on file prior to encounter.  aspirin (ASA) 81 MG chewable tablet, Take 81 mg by mouth daily  calcium carbonate 750 MG CHEW, Take 2 tablets by mouth  famotidine (PEPCID) 40 MG tablet, Take 1 tablet (40 mg) by mouth nightly as needed for heartburn  NIFEdipine ER OSMOTIC (PROCARDIA XL) 90 MG 24 hr tablet, Take 1 tablet (90 mg) by mouth daily  Prenatal Vit-Fe Fumarate-FA (PRENATAL MULTIVITAMIN W/IRON) 27-0.8 MG tablet, Take 1 tablet by mouth daily        Allergies:  No Known Allergies    FamilyHX:  Family History   Problem Relation Age of Onset    Hypertension Mother     Hypertension Father     Sleep Apnea Father     Hypertension Maternal Grandmother     Arthritis Maternal Grandmother     Parkinsonism Maternal Grandfather     Heart Disease Maternal Grandfather     Leukemia Maternal Grandfather     Heart Surgery Paternal Grandfather        SocialHX:   Social History     Socioeconomic History    Marital status:      Spouse name: Shun    Number of children: 1    Years of education: None    Highest education level: None   Occupational History    Occupation: [employer]-full time    Tobacco Use    Smoking status: Never Smoker    Smokeless tobacco: Never Used   Substance and Sexual Activity    Alcohol use: Not Currently    Drug use: Never    Sexual activity: Yes     Partners: Male     Birth control/protection: None       ROS: 10-point ROS negative except as indicated in HPI.    Physical Exam:  Vitals:    07/28/22 1411 07/28/22 1421 07/28/22 1431 07/28/22 1441   BP: (!) 141/94 (!) 139/96 (!) 149/100 (!) 145/102   BP Location:       Patient Position:       Resp: 18      Temp:       TempSrc:         General: alert, oriented female, resting in bed in NAD  CV: regular rate and rhythm, normal s1 and s2, no murmurs  Lungs: clear bilaterally, no crackles or wheezes  Abdomen: soft, gravid, non-tender  Extremities: bilateral lower extremities non-tender with trace edema    FHT: baseline 125, moderate variability accelerations, absent decelerations  [place]: quiet     Prenatal ultrasounds:  7/21/22 US Follow-up  BPD 7.97 cm 32w0d 49%   HC 29.51 cm 32w4d 36%   AC 26.29 cm 30w3d 14%   FL 6.02 cm 31w2d 26%   EFW (lbs/oz) 3 lbs               12ozs       EFW (g) 1,689 g 20%       Prenatal Labs:    Lab Results   Component Value Date    AS Negative 07/28/2022    HEPBANG Nonreactive 01/31/2022    CHPCRT Negative 02/21/2022    GCPCRT Negative 02/21/2022    HGB 13.3 07/28/2022       GBS Status:   No results found for: GBS    No results found for: PAP    Labs:   Results for orders placed or performed during the hospital encounter of 07/28/22 (from the past 24 hour(s))   Protein  random urine   Result Value Ref Range    Total Protein Random Urine g/L 0.08 g/L    Total Protein Urine g/gr Creatinine 0.21 (H) 0.00 - 0.20 g/g Cr    Creatinine Urine mg/dL 39 mg/dL   ABO/Rh type and screen    Narrative    The following orders were created for panel order ABO/Rh type and screen.  Procedure                               Abnormality         Status                     ---------                               -----------          ------                     Adult Type and Screen[[id number]]                            Final result                 Please view results for these tests on the individual orders.   CBC with platelets   Result Value Ref Range    WBC Count 12.3 (H) 4.0 - 11.0 10e3/uL    RBC Count 4.61 3.80 - 5.20 10e6/uL    Hemoglobin 13.3 11.7 - 15.7 g/dL    Hematocrit 39.2 35.0 - 47.0 %    MCV 85 78 - 100 fL    MCH 28.9 26.5 - 33.0 pg    MCHC 33.9 31.5 - 36.5 g/dL    RDW 13.0 10.0 - 15.0 %    Platelet Count 219 150 - 450 10e3/uL   AST   Result Value Ref Range    AST 21 0 - 45 U/L   ALT   Result Value Ref Range    ALT 33 0 - 50 U/L   Creatinine   Result Value Ref Range    Creatinine 0.51 (L) 0.52 - 1.04 mg/dL    GFR Estimate >90 >60 mL/min/1.73m2   Adult Type and Screen   Result Value Ref Range    ABO/RH(D) A POS     Antibody Screen Negative Negative    SPECIMEN EXPIRATION DATE [id number]          Assessment/Plan: 32 year old  at 32w5d by LMP c/w 7w4d US, here for continued monitoring and evaluation of severe range blood pressures in the setting of chronic hypertension.      # cHTN, rule out SI Pre-eclampsia w/ SF  Patient with a history of cHTN. She has history of SI preeclampsia in her previous pregnancy. Patient currently taking 90 mg Procardia, XL nightly. She presented to triage with sustained severe range blood pressures requiring 20, 40 and then repeat 20mg IV labetolol. HELLP labs normal and UPC 0.26. At this point, this is most consistent with uncontrolled cHTN.  Patient is currently asymptomatic. Plan to admit for further workup, BP monitoring, and evaluation for SI preE w/SF.   - Admit to antepartum   - Serial BP monitoring   - IV Antihypertensives prn for sustained severe range blood pressures (>160/>110)  - Will continue PTA 90mg Procardia XL; plan to adjust BP meds, PRN  - repeat HELLP labs in AM  - Daily weights, strict I&Os   - Will consult M at this time.     #History of High Transverse C/S  - Plan for  repeat CS  - CS consent form to be signed     Fetal well-being  - Category I FHT  - TID monitoring as long as tracing continues to be reassuring for gestational age  - Betamethasone today, 2nd dose in 24 hours    Patient seen and care plan discussed under supervision of Dr. Ziegler.    Charlotte Moyer MD  OB/GYN Resident, PGY-2    Staff MD Note    I appreciate the note by Dr. Moyer.  Any necessary changes have been made by me.  I saw and evaluated the patient and agree with the findings and plan of care as documented in the note.  Discussed patient with Dr. Nowak and will plan admission to Antepartum service for continued management.    Laila Ziegler MD

## 2022-07-28 NOTE — PLAN OF CARE
Goal Outcome Evaluation:    Plan of Care Reviewed With: patient, spouse     Overall Patient Progress: no change    Patient transferred to antepartum at 1550 for blood pressure observation and management. Patient remains stable at this time. Continue with plan of care.

## 2022-07-28 NOTE — PROVIDER NOTIFICATION
22 1246   Provider Notification   Provider Name/Title Dr Moyer   Method of Notification Electronic Page   Request Evaluate in Person   Notification Reason Patient Arrived   Patient here from clinic for rule out pre-e, elevated BPs in clinic. H/o CHTN on meds,  section, , 32w5d. Denies headache, vision changes, epigastric pain. First BP elevated 160/109, patient feeling stressed and upset about triage admission.

## 2022-07-28 NOTE — PROVIDER NOTIFICATION
07/28/22 1450   Provider Notification   Provider Name/Title Dr Ziegler   Method of Notification At Bedside   Notification Reason Status Update     Provider at bedside to discuss plan of care with patient. Plan per provider is to admit patient to antepartum for close observation of blood pressures and beta. Patient understands the plan of care and all questions were answered. Will continue to monitor blood pressures closely.

## 2022-07-28 NOTE — LETTER
July 30, 2022        Rosemarie GALVEZ Alex  [address] [place] [address]    To Whom it May Concern:    Due to her medical care needs, please allow Rosemarie Johnson to work from home when needed. Please contact us if there are any questions.     Sincerely,        Pete Mobley MD

## 2022-07-29 LAB
ALBUMIN SERPL-MCNC: 2.7 G/DL (ref 3.4–5)
ALP SERPL-CCNC: 146 U/L (ref 40–150)
ALT SERPL W P-5'-P-CCNC: 27 U/L (ref 0–50)
ALT SERPL W P-5'-P-CCNC: 29 U/L (ref 0–50)
ANION GAP SERPL CALCULATED.3IONS-SCNC: 8 MMOL/L (ref 3–14)
AST SERPL W P-5'-P-CCNC: 16 U/L (ref 0–45)
AST SERPL W P-5'-P-CCNC: 18 U/L (ref 0–45)
BILIRUB SERPL-MCNC: 0.2 MG/DL (ref 0.2–1.3)
BUN SERPL-MCNC: 11 MG/DL (ref 7–30)
CALCIUM SERPL-MCNC: 8.9 MG/DL (ref 8.5–10.1)
CHLORIDE BLD-SCNC: 106 MMOL/L (ref 94–109)
CO2 SERPL-SCNC: 23 MMOL/L (ref 20–32)
CREAT SERPL-MCNC: 0.38 MG/DL (ref 0.52–1.04)
CREAT SERPL-MCNC: 0.44 MG/DL (ref 0.52–1.04)
CREAT UR-MCNC: 40 MG/DL
ERYTHROCYTE [DISTWIDTH] IN BLOOD BY AUTOMATED COUNT: 12.9 % (ref 10–15)
ERYTHROCYTE [DISTWIDTH] IN BLOOD BY AUTOMATED COUNT: 13 % (ref 10–15)
GFR SERPL CREATININE-BSD FRML MDRD: >90 ML/MIN/1.73M2
GFR SERPL CREATININE-BSD FRML MDRD: >90 ML/MIN/1.73M2
GLUCOSE BLD-MCNC: 146 MG/DL (ref 70–99)
HCT VFR BLD AUTO: 36.1 % (ref 35–47)
HCT VFR BLD AUTO: 36.4 % (ref 35–47)
HGB BLD-MCNC: 12.1 G/DL (ref 11.7–15.7)
HGB BLD-MCNC: 12.2 G/DL (ref 11.7–15.7)
MCH RBC QN AUTO: 28.9 PG (ref 26.5–33)
MCH RBC QN AUTO: 28.9 PG (ref 26.5–33)
MCHC RBC AUTO-ENTMCNC: 33.5 G/DL (ref 31.5–36.5)
MCHC RBC AUTO-ENTMCNC: 33.5 G/DL (ref 31.5–36.5)
MCV RBC AUTO: 86 FL (ref 78–100)
MCV RBC AUTO: 86 FL (ref 78–100)
PLATELET # BLD AUTO: 203 10E3/UL (ref 150–450)
PLATELET # BLD AUTO: 204 10E3/UL (ref 150–450)
POTASSIUM BLD-SCNC: 3.6 MMOL/L (ref 3.4–5.3)
PROT SERPL-MCNC: 6.5 G/DL (ref 6.8–8.8)
PROT UR-MCNC: 0.06 G/L
PROT/CREAT 24H UR: 0.15 G/G CR (ref 0–0.2)
RBC # BLD AUTO: 4.18 10E6/UL (ref 3.8–5.2)
RBC # BLD AUTO: 4.22 10E6/UL (ref 3.8–5.2)
SODIUM SERPL-SCNC: 137 MMOL/L (ref 133–144)
WBC # BLD AUTO: 16.8 10E3/UL (ref 4–11)
WBC # BLD AUTO: 18 10E3/UL (ref 4–11)

## 2022-07-29 PROCEDURE — 250N000013 HC RX MED GY IP 250 OP 250 PS 637: Performed by: OBSTETRICS & GYNECOLOGY

## 2022-07-29 PROCEDURE — 84460 ALANINE AMINO (ALT) (SGPT): CPT | Performed by: OBSTETRICS & GYNECOLOGY

## 2022-07-29 PROCEDURE — 120N000002 HC R&B MED SURG/OB UMMC

## 2022-07-29 PROCEDURE — 80053 COMPREHEN METABOLIC PANEL: CPT | Performed by: OBSTETRICS & GYNECOLOGY

## 2022-07-29 PROCEDURE — 84156 ASSAY OF PROTEIN URINE: CPT | Performed by: OBSTETRICS & GYNECOLOGY

## 2022-07-29 PROCEDURE — 85027 COMPLETE CBC AUTOMATED: CPT | Performed by: OBSTETRICS & GYNECOLOGY

## 2022-07-29 PROCEDURE — 250N000013 HC RX MED GY IP 250 OP 250 PS 637

## 2022-07-29 PROCEDURE — 250N000011 HC RX IP 250 OP 636

## 2022-07-29 PROCEDURE — 59025 FETAL NON-STRESS TEST: CPT | Mod: 26 | Performed by: OBSTETRICS & GYNECOLOGY

## 2022-07-29 PROCEDURE — 36415 COLL VENOUS BLD VENIPUNCTURE: CPT | Performed by: OBSTETRICS & GYNECOLOGY

## 2022-07-29 PROCEDURE — 99232 SBSQ HOSP IP/OBS MODERATE 35: CPT | Mod: 25 | Performed by: OBSTETRICS & GYNECOLOGY

## 2022-07-29 PROCEDURE — 84450 TRANSFERASE (AST) (SGOT): CPT | Performed by: OBSTETRICS & GYNECOLOGY

## 2022-07-29 PROCEDURE — 250N000009 HC RX 250

## 2022-07-29 RX ORDER — LABETALOL 200 MG/1
200 TABLET, FILM COATED ORAL 2 TIMES DAILY
Status: DISCONTINUED | OUTPATIENT
Start: 2022-07-29 | End: 2022-07-30 | Stop reason: HOSPADM

## 2022-07-29 RX ADMIN — ASPIRIN 81 MG CHEWABLE TABLET 81 MG: 81 TABLET CHEWABLE at 20:11

## 2022-07-29 RX ADMIN — PRENATAL VIT W/ FE FUMARATE-FA TAB 27-0.8 MG 1 TABLET: 27-0.8 TAB at 20:11

## 2022-07-29 RX ADMIN — NIFEDIPINE 60 MG: 30 TABLET, FILM COATED, EXTENDED RELEASE ORAL at 08:08

## 2022-07-29 RX ADMIN — NIFEDIPINE 60 MG: 30 TABLET, FILM COATED, EXTENDED RELEASE ORAL at 21:10

## 2022-07-29 RX ADMIN — FAMOTIDINE 40 MG: 20 TABLET ORAL at 17:41

## 2022-07-29 RX ADMIN — LABETALOL HCL 200 MG: 200 TABLET, FILM COATED ORAL at 19:04

## 2022-07-29 RX ADMIN — Medication 20 MG: at 06:36

## 2022-07-29 RX ADMIN — BETAMETHASONE SODIUM PHOSPHATE AND BETAMETHASONE ACETATE 12 MG: 3; 3 INJECTION, SUSPENSION INTRA-ARTICULAR; INTRALESIONAL; INTRAMUSCULAR at 15:20

## 2022-07-29 RX ADMIN — PANTOPRAZOLE SODIUM 40 MG: 40 TABLET, DELAYED RELEASE ORAL at 06:35

## 2022-07-29 ASSESSMENT — ACTIVITIES OF DAILY LIVING (ADL)
ADLS_ACUITY_SCORE: 20

## 2022-07-29 NOTE — PLAN OF CARE
Data: Today is hospital day 2. Maternal status VSS, afebrile. Denies painful ctx, leaking of fluid, and/or vaginal bleeding. Endorses positive fetal movement. Denies s/sx of preE. Reports acid reflux with meals that is well controlled with medications. Fetal assessment is appropriate per gestational age.   Action: Continue with plan of care, which is adjust medications for hypertension for appropriate management and monitor VS and labs. Patient encouraged to move extremities while in bed.  Response: Patient coping well overall. She is motivated to return home to her 2-year old son.

## 2022-07-29 NOTE — PROVIDER NOTIFICATION
07/28/22 1909   Provider Notification   Provider Name/Title Dr Nowak   Method of Notification In Department   Dr Nowak okayed patient to switch to TID monitoring and come off continuous.

## 2022-07-29 NOTE — PROGRESS NOTES
"Antepartum Progress Note    S: Patient feeling well today. She denies any chest pain, SOB, headache, vision changes, or RUQ pain. She denies any contractions, vaginal bleeding or LOF. +FM.     O:   Patient Vitals for the past 24 hrs:   BP Temp Temp src Pulse Resp Height Weight   07/29/22 1417 (!) 149/89 98.8  F (37.1  C) Oral -- 18 -- --   07/29/22 1219 (!) 148/96 -- -- -- -- -- --   07/29/22 1008 (!) 142/96 -- -- -- -- -- --   07/29/22 0700 -- -- -- -- -- -- 93.5 kg (206 lb 3.2 oz)   07/29/22 0534 (!) 136/90 -- -- 76 -- -- --   07/29/22 0132 123/84 -- -- 80 -- -- --   07/28/22 2138 139/83 99.1  F (37.3  C) Oral -- 16 -- --   07/28/22 2033 132/88 -- -- -- -- -- --   07/28/22 1933 (!) 146/92 -- -- -- -- -- --   07/28/22 1825 (!) 140/92 97.9  F (36.6  C) Oral -- -- -- --   07/28/22 1752 (!) 136/97 -- -- -- -- -- --   07/28/22 1720 (!) 150/103 -- -- -- -- -- --   07/28/22 1705 (!) 144/98 -- -- -- -- -- --   07/28/22 1650 (!) 144/98 -- -- -- -- -- --   07/28/22 1635 (!) 149/97 -- -- -- -- -- --   07/28/22 1616 (!) 142/97 -- -- -- -- -- --   07/28/22 1606 (!) 140/96 -- -- -- 16 -- --   07/28/22 1545 (!) 146/101 -- -- -- -- -- --   07/28/22 1535 (!) 139/97 -- -- -- -- -- --   07/28/22 1514 (!) 165/105 -- -- -- -- -- --   07/28/22 1500 -- -- -- -- -- 1.715 m (5' 7.5\") 92.1 kg (203 lb)   07/28/22 1459 (!) 166/107 -- -- -- 18 -- --     Gen: Appears comfortable, NAD, sitting in bed  CV: S1, S2, no murmurs, Regular rate and rhythm  Pulm: non-labored breathing, CTAB  Abd: Gravid, non-tender   Ext: Warm, well perfused, 1+ edema bilaterally    Weight:   Wt Readings from Last 2 Encounters:   07/29/22 93.5 kg (206 lb 3.2 oz)   07/21/22 92.1 kg (203 lb)       FHT: 125 baseline, moderate variability, + accels, no decels  TOCO: No ctx in 10 minute period    I/Os:   Intake/Output Summary (Last 24 hours) at 7/29/2022 1532  Last data filed at 7/29/2022 1400  Gross per 24 hour   Intake 2350 ml   Output 4475 ml   Net -2125 ml       Labs: "    Latest Reference Range & Units 22 06:24   Creatinine 0.52 - 1.04 mg/dL 0.44 (L)   GFR Estimate >60 mL/min/1.73m2 >90   ALT 0 - 50 U/L 27   AST 0 - 45 U/L 18     Platelet Count   Date Value Ref Range Status   2022 203 150 - 450 10e3/uL Final     Imaging: No results found for this or any previous visit (from the past 24 hour(s)).      Assessment/Plan: 32 year old  at 32w5d by LMP c/w 7w4d US, here for continued monitoring and evaluation of severe range blood pressures in the setting of chronic hypertension.     # cHTN, rule out SI Pre-eclampsia w/ SF  Patient with a history of cHTN. She has history of SI preeclampsia in her previous pregnancy. Patient currently taking 90 mg Procardia, XL nightly. She presented to triage with sustained severe range blood pressures requiring 20, 40 and then repeat 20mg IV labetolol. HELLP labs normal and UPC 0.26. Suspect that current hypertension is related to uncontrolled cHTN, and not superimposed preeclampsia. Her blood pressures today are significantly changed from her baseline blood pressures. We discussed the updated blood pressure goals for pregnancy in women with cHTN of <140/90.  Patient is currently asymptomatic.  This afternoon her BPs rised to 140's/90.   - Antepartum admission for serial blood pressure monitoring  - IV Antihypertensives prn for sustained severe range blood pressures (>160/>110)  - Procardia XL 60 mg BID  - Labetalol 200 BID  - Daily weights, strict I&Os   - Betamethasone course  and   - Serial ultrasounds to monitor fetal growth every 4 weeks  - Weekly BPP at this time. Would recommend increasing to twice weekly if diagnosed with SI Pree.     #GERD  - Continue Pepcid 20 mg twice daily  - Add Protonix 40 mg daily  - TUMS prn     #History of High Transverse C/S  - Plan for repeat CS  - CS consent form to be signed  - T&C 2 units of blood at the time of delivery given history of PPH focal accreta     # Fetal well-being  -  Category 1 FHT  - TID monitoring  - Betamethasone ,      Discharge Plan: Anticipate inpatient observation through tonight to ensure adequate blood pressure control with new dosage of Procardia XL and Labetalol and that no other signs or symptoms of preeclampsia develop. Plan to resume care with S after discharge.    Patient seen and discussed with Dr. Samantha Brar, MS4  [facility] Medical Student    MFM Attending Attestation  I saw this patient with the medical student and agree with the medical student's findings and plan of care as documented in the medical student's note. I personally reviewed her vital signs, medications, labs, pertinent imaging, and fetal monitoring. In summary, Ms. Redmond is a 32 year old  at 32w6d admitted with a suspected exacerbation of chronic hypertension. Her pregnancy is otherwise complicated by a history of superimposed preeclampsia with severe features (based on severe range blood pressures) necessitating delivery at 30w2d due to FGR with reversed end diastolic flow. Her delivery was complicated by a postpartum hemorrhage with a focal placental accreta found on placental pathology.       With regards to her elevated blood pressures - she has been on Nifedipine this entire pregnancy (switched from her prior to pregnancy Losartan at the time of IUD removal). She was on 30mg XL until approximately 20 weeks at which time she was increased to 60mgXL. She was subsequently increased to 90mg XL at 27 weeks. In reviewing her blood pressures from both clinic and home (she has a blood pressure cuff and keeps a log) - they have been suboptimally controlled all pregnancy mostly in the 130s-140s/90s-100s. On admission yesterday she did require multiple doses of IV antihypertensives to bring her blood pressures out of severe range. She is currently asymptomatic with normal labs and clinical exam, however, her UPC increased from 0.1 to 0.21. We reviewed  "that this is likely an exacerbation of her chronic hypertension, however, given her history I would recommend ongoing inpatient observation with close blood pressure monitoring and repeat labs tomorrow. We will plan to increase her blood pressure medication to Nifedipine 120mg XL (60mg BID) and add Labetalol 200mg BID. We discussed indications for ongoing admission and reviewed what criteria we use to make a diagnosis of preeclampsia with severe features. We discussed that at this time her working diagnosis is an exacerbation of chronic hypertension but this may change as her pregnancy continues to progress. They are amenable to this plan.    BP (!) 149/89   Pulse 76   Temp 98.8  F (37.1  C) (Oral)   Resp 18   Ht 1.715 m (5' 7.5\")   Wt 93.5 kg (206 lb 3.2 oz)   LMP 12/11/2021   BMI 31.82 kg/m        FHT: Baseline 120s, moderate variability, +accels, no decels  TOCO: None  NST interpretation for today: reactive, reassuring and appropriate for GA    Time Spent on this Encounter   I spent a total of 25 minutes face-to-face or coordinating care of Ms. Johnson. Over 50% of my time on the unit was spent counseling the patient and /or coordinating care regarding diagnosis, diagnostic results, prognosis and risks and benefits of treatment options.     Date of service (when I saw the patient): 7/29/2022     Gris Singh MD  , OB/GYN  Maternal-Fetal Medicine  [phone] (Pager)           "

## 2022-07-29 NOTE — DISCHARGE SUMMARY
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  - Placenta with Left Lateral Succenturiate Lobe    Discharge Diagnosis:  Same  Chronic hypertension exacerbation    Procedures:  Betamethasone course (-)    Consultations:  M    Medications prior to admission:  Medications Prior to Admission   Medication Sig Dispense Refill Last Dose     aspirin (ASA) 81 MG chewable tablet Take 81 mg by mouth daily   2022 at Unknown time     calcium carbonate 750 MG CHEW Take 2 tablets by mouth   2022 at Unknown time     Prenatal Vit-Fe Fumarate-FA (PRENATAL MULTIVITAMIN W/IRON) 27-0.8 MG tablet Take 1 tablet by mouth daily   2022 at Unknown time     [DISCONTINUED] famotidine (PEPCID) 40 MG tablet Take 1 tablet (40 mg) by mouth nightly as needed for heartburn 60 tablet 3 2022 at Unknown time     [DISCONTINUED] NIFEdipine ER OSMOTIC (PROCARDIA XL) 90 MG 24 hr tablet Take 1 tablet (90 mg) by mouth daily 30 tablet 3 2022 at Unknown time     Brief History of Presentation:   Rosemarie Johnson is a 32 year old  at 32w5d by LMP consistent with 7w4d US who was admitted for evaluation for superimposed preeclampsia after she had severe range blood pressure in clinic today at the time of her scheduled BPP. She was sent to L&D for further evaluation for preeclampsia and her blood pressure were initially in the severe range persistently. Rosemarie was treated with labetalol 20 mg, 40 mg followed by a third dose of 20 mg IV with improvement in her blood pressures.     Hospital Course:   Rosemarie was given a betamethasone course (7/28-29) for fetal lung maturity. Her blood pressure meds were uptitrated to Nifedipine 60 mg XL BID. BPs continued to be elevated on 7/29 and added PO Labetalol 200 mg BID. On this regimen, her BP were normal range. Her HELLP labs remained normal and her UPC was 0.15 at discharge. She was discharged with strict return precautions and close follow up.     Discharge Instructions:  Call or present to labor and delivery if you experience:   -Regular painful contractions concerning for labor   -Leakage of fluid concerning for ruptured membranes   -Decreased fetal movement   -Bright red vaginal bleeding    -Headache, vision changes, upper abdominal pain, significant increase in swelling, generalized unwell feeling   - BP >160 or >110    Follow up:  Follow up in clinic on next week with Dr. Alvarez as scheduled      Discharge Medications:  Current Discharge Medication List      START taking these medications    Details   labetalol (NORMODYNE) 200 MG tablet Take 1 tablet (200 mg) by mouth 2 times daily  Qty: 60 tablet, Refills: 2    Associated Diagnoses: Chronic hypertension affecting pregnancy      pantoprazole (PROTONIX) 40 MG EC tablet Take 1 tablet (40 mg) by mouth every morning (before breakfast)  Qty: 30 tablet, Refills: 2    Associated Diagnoses: Gastroesophageal reflux disease with esophagitis without hemorrhage         CONTINUE these medications which have CHANGED    Details   famotidine (PEPCID) 40 MG tablet Take 1 tablet (40 mg) by mouth 2 times daily  Qty: 100 tablet, Refills: 1    Associated Diagnoses: Gastroesophageal reflux disease with esophagitis without hemorrhage      NIFEdipine ER OSMOTIC (ADALAT CC) 60 MG 24 hr tablet Take 1 tablet (60 mg) by mouth every 12 hours  Qty: 90 tablet, Refills: 1    Associated Diagnoses: Chronic hypertension affecting pregnancy         CONTINUE these medications which have NOT CHANGED    Details   aspirin (ASA) 81 MG chewable tablet  Take 81 mg by mouth daily      calcium carbonate 750 MG CHEW Take 2 tablets by mouth      Prenatal Vit-Fe Fumarate-FA (PRENATAL MULTIVITAMIN W/IRON) 27-0.8 MG tablet Take 1 tablet by mouth daily             Pete Mobley MD  OB/GYN PGY-3  07/30/2022 10:57 AM

## 2022-07-29 NOTE — CONSULTS
Inpatient Maternal-Fetal Medicine Consultation    Rosemarie Johsnon  : 1990  MRN: [id number]    REFERRAL: Dr. Ziegler    HPI:  Rosemarie Johnson is a 32 year old  at 32w5d by LMP consistent with 7w4d US who was admitted for evaluation for superimposed preeclampsia after she had severe range blood pressure in clinic today at the time of her scheduled BPP. She was sent to L&D for further evaluation for preeclampsia and her blood pressure were initially in the severe range persistently. Rosemarie was treated with labetalol 20 mg, 40 mg followed by a third dose of 20 mg IV with improvement in her blood pressures. She denies any signs or symptoms of preeclampsia. No headache, abdominal pain, scotoma. She does have mild lower edema, for which she has been wearing hazel stockings.     Rosemarie has been on Procardia XL 90 mg every night. She monitors her blood pressures at home and they have been in the 140's-150's/90's-100's.    Rosemarie does not a history of heartburn in the pregnancy that has been getting worse. She is currently on Pepcid twice daily, TUMS as needed, and monitoring her diet (avoiding big meals or spicy foods), but she continues to have symptomatic heartburn.    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  Pitx3, cytotec, hemabate, txax2 Hb 10.1 (from 13.6) Hemorrhage 1760 total      delivery delivered 3/27/2020    Last Assessment & Plan:  Formatting of this note might be different from the original. Rosemarie Johnson is a 30 year old  who was admitted at 30w1d with pregnancy complicated by chronic hypertension, vasa previa, single umbilical artery, and IUGR, 2%ile. She was admitted on 3/27 from a scheduled ultrasound for prolonged monitoring due to decelerations in the heart rate and intermitted reversed do     Chronic hypertension      Delayed or secondary postpartum hemorrhage 3/28/2020    Formatting of this note might be different from the original. 1003cc after expressed clots. cytotec 800, oxytocinx3     High-risk pregnancy, unspecified trimester 2022     PCOS (polycystic ovarian syndrome)     dx age 12     Placenta accreta 2020    Formatting of this note might be different from the original. Pathology after delivery shows focal placenta accreta.  Patient currently asymptomatic     Vasa previa 2020       Past Surgical History:  Past Surgical History:   Procedure Laterality Date     C ORAL SURGERY SINGLE TOOTH      removal of impacted tooth     WISDOM TOOTH EXTRACTION       Current Medications:  Prior to Admission medications    Medication Sig Last Dose Taking? Auth Provider Long Term End Date   aspirin (ASA) 81 MG chewable tablet Take 81 mg by mouth daily 2022 at Unknown time Yes Reported, Patient     calcium carbonate 750 MG CHEW Take 2 tablets by mouth 2022 at Unknown time Yes Reported, Patient     famotidine (PEPCID) 40 MG tablet Take 1 tablet (40 mg) by mouth nightly as needed for heartburn 2022 at Unknown time Yes Marti Russo MD     NIFEdipine ER OSMOTIC (PROCARDIA XL) 90 MG 24 hr tablet Take 1 tablet (90 mg) by mouth daily 2022 at Unknown time Yes Marti Russo MD Yes    Prenatal Vit-Fe Fumarate-FA (PRENATAL MULTIVITAMIN W/IRON) 27-0.8 MG tablet  "Take 1 tablet by mouth daily 7/27/2022 at Unknown time Yes Reported, Patient       Allergies:  Patient has no known allergies.    Social History:   Social History     Socioeconomic History     Marital status:      Spouse name: Shun     Number of children: 1     Years of education: Not on file     Highest education level: Not on file   Occupational History     Occupation: [employer]-full time   Tobacco Use     Smoking status: Never Smoker     Smokeless tobacco: Never Used   Substance and Sexual Activity     Alcohol use: Not Currently     Drug use: Never     Sexual activity: Yes     Partners: Male     Birth control/protection: None   Other Topics Concern     Not on file   Social History Narrative     Not on file     Social Determinants of Health     Financial Resource Strain: Not on file   Food Insecurity: Not on file   Transportation Needs: Not on file   Physical Activity: Not on file   Stress: Not on file   Social Connections: Not on file   Intimate Partner Violence: Not on file   Housing Stability: Not on file     Family History:  Family History   Problem Relation Age of Onset     Hypertension Mother      Hypertension Father      Sleep Apnea Father      Hypertension Maternal Grandmother      Arthritis Maternal Grandmother      Parkinsonism Maternal Grandfather      Heart Disease Maternal Grandfather      Leukemia Maternal Grandfather      Heart Surgery Paternal Grandfather      ROS:  10-point ROS negative except as in HPI     PHYSICAL EXAM:  /88   Temp 97.9  F (36.6  C) (Oral)   Resp 16   Ht 1.715 m (5' 7.5\")   Wt 92.1 kg (203 lb)   LMP 12/11/2021   BMI 31.33 kg/m    Gen: NAD, well appearing  Chest: Non-labored breathing  Abdomen: gravid  Extremities: trace lower extremity edema    Labs:   Results for orders placed or performed during the hospital encounter of 07/28/22 (from the past 24 hour(s))   Protein  random urine   Result Value Ref Range     Total Protein Random Urine g/L 0.08 g/L     Total " Protein Urine g/gr Creatinine 0.21 (H) 0.00 - 0.20 g/g Cr     Creatinine Urine mg/dL 39 mg/dL   ABO/Rh type and screen     Narrative     The following orders were created for panel order ABO/Rh type and screen.  Procedure                               Abnormality         Status                     ---------                               -----------         ------                     Adult Type and Screen[[id number]]                            Final result                  Please view results for these tests on the individual orders.   CBC with platelets   Result Value Ref Range     WBC Count 12.3 (H) 4.0 - 11.0 10e3/uL     RBC Count 4.61 3.80 - 5.20 10e6/uL     Hemoglobin 13.3 11.7 - 15.7 g/dL     Hematocrit 39.2 35.0 - 47.0 %     MCV 85 78 - 100 fL     MCH 28.9 26.5 - 33.0 pg     MCHC 33.9 31.5 - 36.5 g/dL     RDW 13.0 10.0 - 15.0 %     Platelet Count 219 150 - 450 10e3/uL   AST   Result Value Ref Range     AST 21 0 - 45 U/L   ALT   Result Value Ref Range     ALT 33 0 - 50 U/L   Creatinine   Result Value Ref Range     Creatinine 0.51 (L) 0.52 - 1.04 mg/dL     GFR Estimate >90 >60 mL/min/1.73m2   Adult Type and Screen   Result Value Ref Range     ABO/RH(D) A POS       Antibody Screen Negative Negative     SPECIMEN EXPIRATION DATE [id number]         Assessment/Plan: 32 year old  at 32w5d by LMP c/w 7w4d US, here for continued monitoring and evaluation of severe range blood pressures in the setting of chronic hypertension.     # cHTN, rule out SI Pre-eclampsia w/ SF  Patient with a history of cHTN. She has history of SI preeclampsia in her previous pregnancy. Patient currently taking 90 mg Procardia, XL nightly. She presented to triage with sustained severe range blood pressures requiring 20, 40 and then repeat 20mg IV labetolol. HELLP labs normal and UPC 0.26. Suspect that current hypertension is related to uncontrolled cHTN, and not superimposed preeclampsia. Her blood pressures today are significantly  changed from her baseline blood pressures. We discussed the updated blood pressure goals for pregnancy in women with cHTN of <140/90.  Patient is currently asymptomatic.    - Agree with antepartum assessment for serial blood pressure monitoring  - IV Antihypertensives prn for sustained severe range blood pressures (>160/>110)  - Will adjust her PTA medication to Procardia XL 60 mg twice daily starting tomorrow morning  - repeat HELLP labs in AM  - Daily weights, strict I&Os   - Betamethasone course for FLM  - Serial ultrasounds to monitor fetal growth every 4 weeks  - Weekly BPP at this time. Would recommend increasing to twice weekly if diagnosed with SI Pree.    #GERD  - Continue Pepcid 20 mg twice daily  - Add Protonix 40 mg daily  - TUMS prn    #History of High Transverse C/S  - Plan for repeat CS  - CS consent form to be signed  - T&C 2 units of blood at the time of delivery given history of PPH focal accreta     # Fetal well-being  - Category I FHT  - TID monitoring  - Betamethasone today, 2nd dose in 24 hours    Dispo: Anticipate inpatient observation through tomorrow evening or Saturday to ensure adequate blood pressure control with new dosage of Procardia XL and that no other signs or symptoms of preeclampsia develop. While inpatient we will assume care for Rosemarie on the Maternal-Fetal Medicine service with plan to resume care with [facility] after discharge.    Eleonora Nowak MD  Specialist in Maternal-Fetal Medicine    July 28, 2022 , 10:08 PM

## 2022-07-29 NOTE — PLAN OF CARE
Patient is here for elevated BPs, all of which have been WNL or in the mild-range overnight. All other vitals WNL. Patient denies HA, vision changes, or RUQ pain. Patient denies vaginal bleeding, changes in discharge, pelvic pressure or back pain. Plan is to start 60 mg nifedipine BID this morning. Patient to get second betamethasone later today. EFM as charted. Continue with plan of care.

## 2022-07-30 VITALS
DIASTOLIC BLOOD PRESSURE: 95 MMHG | TEMPERATURE: 98.2 F | HEIGHT: 68 IN | RESPIRATION RATE: 16 BRPM | SYSTOLIC BLOOD PRESSURE: 142 MMHG | WEIGHT: 206.2 LBS | BODY MASS INDEX: 31.25 KG/M2 | HEART RATE: 76 BPM

## 2022-07-30 DIAGNOSIS — O10.919 CHRONIC HYPERTENSION AFFECTING PREGNANCY: ICD-10-CM

## 2022-07-30 PROCEDURE — 250N000013 HC RX MED GY IP 250 OP 250 PS 637: Performed by: OBSTETRICS & GYNECOLOGY

## 2022-07-30 PROCEDURE — 250N000013 HC RX MED GY IP 250 OP 250 PS 637

## 2022-07-30 PROCEDURE — 59025 FETAL NON-STRESS TEST: CPT | Mod: 26 | Performed by: OBSTETRICS & GYNECOLOGY

## 2022-07-30 PROCEDURE — 99238 HOSP IP/OBS DSCHRG MGMT 30/<: CPT | Mod: 25 | Performed by: OBSTETRICS & GYNECOLOGY

## 2022-07-30 RX ORDER — LABETALOL 200 MG/1
200 TABLET, FILM COATED ORAL 2 TIMES DAILY
Qty: 60 TABLET | Refills: 2 | Status: SHIPPED | OUTPATIENT
Start: 2022-07-30 | End: 2022-07-30

## 2022-07-30 RX ORDER — LABETALOL 200 MG/1
200 TABLET, FILM COATED ORAL 2 TIMES DAILY
Qty: 60 TABLET | Refills: 2 | Status: ON HOLD | OUTPATIENT
Start: 2022-07-30 | End: 2022-08-30

## 2022-07-30 RX ORDER — FAMOTIDINE 40 MG/1
40 TABLET, FILM COATED ORAL 2 TIMES DAILY
Qty: 100 TABLET | Refills: 1 | Status: SHIPPED | OUTPATIENT
Start: 2022-07-30 | End: 2022-10-10

## 2022-07-30 RX ORDER — PANTOPRAZOLE SODIUM 40 MG/1
40 TABLET, DELAYED RELEASE ORAL
Qty: 30 TABLET | Refills: 2 | Status: SHIPPED | OUTPATIENT
Start: 2022-07-31 | End: 2022-11-18

## 2022-07-30 RX ADMIN — PANTOPRAZOLE SODIUM 40 MG: 40 TABLET, DELAYED RELEASE ORAL at 07:27

## 2022-07-30 RX ADMIN — Medication 20 MG: at 07:27

## 2022-07-30 RX ADMIN — NIFEDIPINE 60 MG: 30 TABLET, FILM COATED, EXTENDED RELEASE ORAL at 08:10

## 2022-07-30 RX ADMIN — LABETALOL HCL 200 MG: 200 TABLET, FILM COATED ORAL at 06:30

## 2022-07-30 ASSESSMENT — ACTIVITIES OF DAILY LIVING (ADL)
ADLS_ACUITY_SCORE: 20
ADLS_ACUITY_SCORE: 20
DEPENDENT_IADLS:: INDEPENDENT
ADLS_ACUITY_SCORE: 20

## 2022-07-30 NOTE — DISCHARGE INSTRUCTIONS
Understanding Preeclampsia  Preeclampsia is high blood pressure (hypertension) that happens during pregnancy. It often shows up around the 20th week of pregnancy. It often goes back to normal by the 12th week after you give birth. It can lead to serious health risks for you and your baby. During your pregnancy, your healthcare provider will watch your blood pressure.      Your blood pressure will be monitored regularly throughout your pregnancy to help check for preeclampsia.   Symptoms  A common symptom of preeclampsia is high blood pressure. Other symptoms may include:   Rapid weight gain  Protein in your urine  Headache  Belly (abdominal) pain on your right side  Vision problems. These include flashes or spots.  Swelling (edema) in your face or hands. This also often happens near the end of normal pregnancies, even without preeclampsia.  Tests you may have  Your healthcare provider will want to check your blood pressure throughout your pregnancy. If your blood pressure is high, you may have the following tests:   Urine tests to look for protein  Blood tests to confirm preeclampsia  Fetal monitoring to make sure that your baby is healthy  Treating preeclampsia  You may need to take a daily low dose of aspirin if you are at risk for preeclampsia. Preeclampsia almost always ends soon after you give birth. Until then, your healthcare provider can help manage your condition. If your symptoms are mild, you may need activity limits at home, including bed rest and no heavy lifting. If your symptoms are severe, you will stay in the hospital. Hospital treatment includes:   Activity limits to help control blood pressure. This means no heavy lifting and 8 hours per day lying down with the feet up.  Magnesium IV (intravenous) drip during labor to prevent seizures  Induced labor or surgical delivery by  section. Delivery is considered the cure for preeclampsia.  When to call your healthcare provider  Call your  healthcare provider if swelling, weight gain, or other symptoms come on quickly or are severe. Some cases of preeclampsia are more severe than others. Your symptoms also may change or get worse as you get closer to your due date.   Who s at risk?  No one knows what causes preeclampsia. Preeclampsia can happen in any pregnant woman. But it is more common in first-time pregnancies. Things that increase the risk include:   Previous pregnancies. You are at risk if you had preeclampsia, intrauterine growth retardation (IUGR),  birth, placental abruption, or fetal death in a past pregnancy.  Health history of mother. You are at risk if you have diabetes, high blood pressure, obesity, kidney disease, autoimmune disease such as lupus, or a family history of preeclampsia.  Current pregnancy. You are at risk if this is your first pregnancy, or if you have multiple fetuses, are younger than age 18 or older than 40, or used in vitro  fertilization.  Race. You are at risk if you are black.  Dangers of preeclampsia  If not treated, preeclampsia can cause problems for you and your baby. The placenta is the organ that nourishes your baby. It may tear away from the uterine wall. This can put the baby at risk for health problems (fetal distress) and premature birth. Preeclampsia can also cause these health problems:   Kidney failure or other organ damage  Seizures  Stroke  Once you give birth  In most cases, preeclampsia goes away on its own soon after you give birth. This is often by the 12th week after you deliver. Within days of delivery, your blood pressure, swelling, and other symptoms should get better. For some women, problems from preeclampsia can continue after delivery.   Postpartum preeclampsia that develops within the first 48 hours after delivery is rare. Another type of postpartum preeclampsia that develops more than 48 hours after delivery is called late-onset preeclampsia. It is also rare. Contact your  healthcare provider right away if you have symptoms of preeclampsia after you deliver.   [employer] last reviewed this educational content on 12/1/2019 2000-2021 [employer]. All rights reserved. This information is not intended as a substitute for professional medical care. Always follow your healthcare professional's instructions.

## 2022-07-30 NOTE — PROGRESS NOTES
Antepartum Progress Note    S: Patient feeling well today. She denies any chest pain, SOB, headache, vision changes, or RUQ pain. She denies any contractions, vaginal bleeding or LOF. +FM.     O:   Patient Vitals for the past 24 hrs:   BP Temp Temp src Resp   22 1012 (!) 142/95 -- -- --   22 0609 135/85 98.2  F (36.8  C) Oral 22 0217 117/75 -- -- --   223 128/81 -- -- --   22 139/88 -- -- --   22 136/87 -- -- --   22 (!) 152/101 98.2  F (36.8  C) Oral 22 1827 (!) 152/94 98.1  F (36.7  C) Oral 22 1642 (!) 138/93 -- -- --   22 1417 (!) 149/89 98.8  F (37.1  C) Oral 18   22 1219 (!) 148/96 -- -- --     Gen: Appears comfortable, NAD, sitting in bed  CV: S1, S2, no murmurs, Regular rate and rhythm  Pulm: non-labored breathing, CTAB  Abd: Gravid, non-tender   Ext: Warm, well perfused, 1+ edema bilaterally    Weight:   Wt Readings from Last 2 Encounters:   22 93.5 kg (206 lb 3.2 oz)   22 92.1 kg (203 lb)       FHT: 130 baseline, moderate variability, + accels, no decels  TOCO: No ctx    I/Os:   I/O last 3 completed shifts:  In: 2750 [P.O.:2750]  Out: 2750 [Urine:2750]    Labs:   No results found for this or any previous visit (from the past 12 hour(s)).    Assessment/Plan: 32 year old  at 32w5d by LMP c/w 7w4d US, here for continued monitoring and evaluation of severe range blood pressures in the setting of chronic hypertension.     # cHTN, rule out SI Pre-eclampsia w/ SF  Patient with a history of cHTN. She has history of SI preeclampsia in her previous pregnancy. Patient was taking 90 mg Procardia, XL nightly. She presented to triage with sustained severe range blood pressures requiring 20, 40 and then repeat 20mg IV labetolol. HELLP labs normal and UPC 0.26. Suspect that current hypertension is related to uncontrolled cHTN, and not superimposed preeclampsia. Her BP medications were titrated up to  procardia 60 BID and labetalol 200 BID. Patient is currently asymptomatic.   - IV Antihypertensives prn for sustained severe range blood pressures (>160/>110)  - Procardia XL 60 mg BID  - Labetalol 200 BID  - HELLP labs normal last night, UPC 0.15 last night, down from 0.21  - Daily weights, strict I&Os   - s/p Betamethasone course  - Serial ultrasounds to monitor fetal growth every 4 weeks, Weekly BPP at this time     #GERD  - Continue Pepcid 20 mg twice daily  - continue Protonix 40 mg daily  - TUMS prn     #History of High Transverse C/S  - Plan for repeat CS  - T&C 2 units of blood at the time of delivery given history of PPH focal accreta     # Fetal well-being  - Category 1 FHT  - TID monitoring  - Betamethasone ,      Discharge Plan: plan for discharge today as her BP have been well controlled on her current BP regimen.     Patient seen and discussed with Dr. Samantha Mobley MD  OB/GYN PGY-3  2022 10:56 AM    MFM Attending Attestation  I saw this patient with the resident and agree with the resident's findings and plan of care as documented in the resident's note. I personally reviewed her vital signs, medications, labs, pertinent imaging, and fetal monitoring. Ms. Redmond is a 32 year old  at 33w0d admitted with a suspected exacerbation of chronic hypertension. Her pregnancy is otherwise complicated by a history of superimposed preeclampsia with severe features (based on severe range blood pressures) necessitating delivery at 30w2d due to FGR with reversed end diastolic flow. Her delivery was complicated by a postpartum hemorrhage with a focal placental accreta found on placental pathology.        With regards to her elevated blood pressures she has been normal to mild range on her new regimen of Nifedipine 60mg XL BID and Labetalol 200mg BID. She continues to be asymptomatic with normal labs and a normal UPC this AM of 0.15. We discussed that it is reasonable for her to  "discharge at this time with strict return precautions. We reviewed the signs/symptoms of preeclampsia in detail and discussed blood pressure parameters. She has a blood pressure cuff for home and will plan to monitor her blood pressures twice a day. She is scheduled for weekly  surveillance with our office and has an OB visit with her primary provider Thursday.     BP (!) 142/95   Pulse 76   Temp 98.2  F (36.8  C) (Oral)   Resp 16   Ht 1.715 m (5' 7.5\")   Wt 93.5 kg (206 lb 3.2 oz)   LMP 2021   BMI 31.82 kg/m        FHT: Baseline 130s, moderate variability, +accels, no decels  TOCO: None  NST interpretation for today: reactive, reassuring and appropriate for GA    Time Spent on this Encounter   I spent a total of 15 minutes face-to-face or coordinating care of Ms. Johnson. Over 50% of my time on the unit was spent counseling the patient and /or coordinating care regarding diagnosis, diagnostic results, prognosis and risks and benefits of treatment options.     Date of service (when I saw the patient): 2022     Gris Singh MD  , OB/GYN  Maternal-Fetal Medicine  [phone] (Pager)         "

## 2022-07-30 NOTE — PLAN OF CARE
Data: Blood pressures within parameters. Signs and symptoms of pre-eclampsia absent. Fetal assessment Reactive.  Interventions: Monitor blood pressures and indicators of pre-eclampsia every 4 hours. Continue uterine/fetal assessment TID. Activity level Regular activity. Preventive measures include Medications, Positioning, and Frequent voiding. Encourage active range of motion and frequent position changes.  Plan: Continue expectant management. Observe for and notify care provider of indicators of worsening pre-eclampsia or signs of fetal/maternal compromise.

## 2022-07-30 NOTE — PROGRESS NOTES
Data: [facility] at 1218 on 22.   Reason for maternal/fetal assessment per patient is Rule Out Pre-eclampsia  . Patient is a . Prenatal record reviewed.      OB History    Para Term  AB Living   2 1 0 1 0 1   SAB IAB Ectopic Multiple Live Births   0 0 0 0 1      # Outcome Date GA Lbr Slava/2nd Weight Sex Delivery Anes PTL Lv   2 Current            1  20 30w2d  0.86 kg (1 lb 14.3 oz) M IAB  N RUI      Medical History:   Past Medical History:   Diagnosis Date     Anemia of mother during pregnancy, delivered 3/28/2020    Formatting of this note might be different from the original. Pitx3, cytotec, hemabate, txax2 Hb 10.1 (from 13.6) Hemorrhage 1760 total      delivery delivered 3/27/2020    Last Assessment & Plan:  Formatting of this note might be different from the original. Rosemarie Johnson is a 30 year old  who was admitted at 30w1d with pregnancy complicated by chronic hypertension, vasa previa, single umbilical artery, and IUGR, 2%ile. She was admitted on 3/27 from a scheduled ultrasound for prolonged monitoring due to decelerations in the heart rate and intermitted reversed do     Chronic hypertension      Delayed or secondary postpartum hemorrhage 3/28/2020    Formatting of this note might be different from the original. 1003cc after expressed clots. cytotec 800, oxytocinx3     High-risk pregnancy, unspecified trimester 2022     PCOS (polycystic ovarian syndrome)     dx age 12     Placenta accreta 2020    Formatting of this note might be different from the original. Pathology after delivery shows focal placenta accreta.  Patient currently asymptomatic     Vasa previa 2020   . Gestational Age 33w0d. VSS. Cervix: not examined.  Fetal movement present. Patient denies cramping, backache, vaginal discharge, pelvic pressure, UTI symptoms, GI problems, bloody show, vaginal bleeding, edema, headache, visual disturbances, epigastric  or URQ pain, abdominal pain, rupture of membranes. Adequate intake and output. Support persons, Shun, present.  Action: EFM applied for TID EFM monitoring. Uterine assessment and Fetal assessment: Presumed adequate fetal oxygenation documented (see flow record). Patient education pamphlets given on fetal movement counts and s/sx of preE. Patient instructed to report change in fetal movement, vaginal leaking of fluid or bleeding, abdominal pain, or any concerns related to the pregnancy to her nurse/physician. List of medications to take and pharmacy medications sent to given to patient. Verbalized understanding and teach back utilized. PIV removed.  Response: Dr. Mobley and Dr. Singh informed of VS and overall status today. Plan per provider is discharge home. Patient verbalized understanding of education and verbalized agreement with plan. Discharged ambulatory at 1111.

## 2022-07-30 NOTE — PROGRESS NOTES
Leandra TA informed via page that the patient went to her [facility]'s to  her prescriptions after discharge, and it was closed. Patient requested her medications be sent to [facility] at [address] [place] [address]. Patient instructed to call back or message [facility] if there is any further difficulty picking up prescriptions. Verbalized understanding.

## 2022-08-04 ENCOUNTER — ANCILLARY PROCEDURE (OUTPATIENT)
Dept: ULTRASOUND IMAGING | Facility: CLINIC | Age: 32
End: 2022-08-04
Attending: OBSTETRICS & GYNECOLOGY
Payer: COMMERCIAL

## 2022-08-04 ENCOUNTER — OFFICE VISIT (OUTPATIENT)
Dept: OBGYN | Facility: CLINIC | Age: 32
End: 2022-08-04
Attending: OBSTETRICS & GYNECOLOGY
Payer: COMMERCIAL

## 2022-08-04 VITALS
HEART RATE: 76 BPM | BODY MASS INDEX: 31.02 KG/M2 | SYSTOLIC BLOOD PRESSURE: 137 MMHG | WEIGHT: 201 LBS | DIASTOLIC BLOOD PRESSURE: 99 MMHG

## 2022-08-04 DIAGNOSIS — O10.919 HTN IN PREGNANCY, CHRONIC: ICD-10-CM

## 2022-08-04 DIAGNOSIS — O09.299 HISTORY OF PLACENTA ACCRETA IN PRIOR PREGNANCY, CURRENTLY PREGNANT: ICD-10-CM

## 2022-08-04 DIAGNOSIS — O09.893 SUPERVISION OF OTHER HIGH RISK PREGNANCIES, THIRD TRIMESTER: Primary | ICD-10-CM

## 2022-08-04 DIAGNOSIS — Z98.891 HISTORY OF CLASSICAL CESAREAN SECTION: ICD-10-CM

## 2022-08-04 DIAGNOSIS — Z01.812 ENCOUNTER FOR PRE-OPERATIVE LABORATORY TESTING: Primary | ICD-10-CM

## 2022-08-04 PROCEDURE — G0463 HOSPITAL OUTPT CLINIC VISIT: HCPCS

## 2022-08-04 PROCEDURE — 99207 PR PRENATAL VISIT: CPT | Performed by: OBSTETRICS & GYNECOLOGY

## 2022-08-04 PROCEDURE — 76819 FETAL BIOPHYS PROFIL W/O NST: CPT

## 2022-08-04 PROCEDURE — 76819 FETAL BIOPHYS PROFIL W/O NST: CPT | Mod: 26 | Performed by: OBSTETRICS & GYNECOLOGY

## 2022-08-04 NOTE — PROGRESS NOTES
Subjective:      Rosemarie is a 32 year old  at 33w5d complicated by chronic HTN on nifedipine and labetalol presents for a routine prenatal appointment.    Rosemarie was recently admitted to L&D ( - ) for evaluation of pre-eclampsia at the time of her scheduled BPP. She was given a betamethasone course. Nifedipine was uptitrated to 60 mg XL BID and labetalol 200 mg BID was added. This is her current BP regimen.    Prior pregnancy 2020 complicated by postpartum hemorrhage with focal placenta accreta, pre-eclampsia, FGR, and high transverse .    Denies vaginal bleeding or leakage of fluid. No contractions. Active fetal movement.        No HA, visual changes, RUQ or epigastric pain.     Patient feeling well overall.      Objective:  Vitals:    22 0850 22 0851 22 0852 22 0853   BP: (!) 135/97 (!) 137/99 (!) 138/101 (!) 137/99   Pulse: 76 76 76 76   Weight: 91.2 kg (201 lb)          Assessment/Plan  Rosemarie is a 32 year old  at 33w5d doing well overall. Pregnancy complicated by chronic HTN on nifedipine 60mg BID and labetalol 200mg BID. History of superimposed pre-e with delivery by high transverse c/s at 30 weeks (also vasa previa, FGR, PPH with focal accreta on placenta path).    - Continue monitoring BP at home. Goal is below 140/100  - Today's BPP w/o NST . Weekly BPP scheduled: 8/10, ,   - Growth scan on   - COVID testing on , repeat c/s on   - Repeat pre-eclampsia labs next week in anticipation of a trip ~2hr outside of [facility].    - Reviewed why/how to contact provider if headache/visual changes/RUQ or epigastric pain, decreased fetal movement, vaginal bleeding, leakage of fluid or more than 4 contractions in an hour.    - Return to clinic in 1 week and prn if questions or concerns.       Helio Obando  Medical Student, MS3    Patient was seen with student who acted as my scribe and was present for learning. I personally assessed, examined  and made medical decisions reflected in the documentation. Any necessary edits to the note have been made by myself. Keyanna Alvarez MD

## 2022-08-04 NOTE — LETTER
2022       RE: Rosemarie Johnson  [address] [place] [address]     Dear Colleague,    Thank you for referring your patient, Rosemarie Johnson, to the [facility] [place] at [facility]. Please see a copy of my visit note below.    Subjective:      Rosemarie is a 32 year old  at 33w5d complicated by chronic HTN on nifedipine and labetalol presents for a routine prenatal appointment.    Rosemarie was recently admitted to L&D ( - ) for evaluation of pre-eclampsia at the time of her scheduled BPP. She was given a betamethasone course. Nifedipine was uptitrated to 60 mg XL BID and labetalol 200 mg BID was added. This is her current BP regimen.    Prior pregnancy 2020 complicated by postpartum hemorrhage with focal placenta accreta, pre-eclampsia, FGR, and high transverse .    Denies vaginal bleeding or leakage of fluid. No contractions. Active fetal movement.        No HA, visual changes, RUQ or epigastric pain.     Patient feeling well overall.      Objective:  Vitals:    22 0850 22 0851 22 0852 22 0853   BP: (!) 135/97 (!) 137/99 (!) 138/101 (!) 137/99   Pulse: 76 76 76 76   Weight: 91.2 kg (201 lb)          Assessment/Plan  Rosemarie is a 32 year old  at 33w5d doing well overall. Pregnancy complicated by chronic HTN on nifedipine 60mg BID and labetalol 200mg BID. History of superimposed pre-e with delivery by high transverse c/s at 30 weeks (also vasa previa, FGR, PPH with focal accreta on placenta path).    - Continue monitoring BP at home. Goal is below 140/100  - Today's BPP w/o NST . Weekly BPP scheduled: 8/10, ,   - Growth scan on   - COVID testing on , repeat c/s on   - Repeat pre-eclampsia labs next week in anticipation of a trip ~2hr outside of [place].    - Reviewed why/how to contact provider if headache/visual changes/RUQ or epigastric pain, decreased fetal  movement, vaginal bleeding, leakage of fluid or more than 4 contractions in an hour.    - Return to clinic in 1 week and prn if questions or concerns.       Helio Obando  Medical Student, MS3    Patient was seen with student who acted as my scribe and was present for learning. I personally assessed, examined and made medical decisions reflected in the documentation. Any necessary edits to the note have been made by myself. Keyanna Alvarez MD

## 2022-08-10 ENCOUNTER — LAB (OUTPATIENT)
Dept: LAB | Facility: CLINIC | Age: 32
End: 2022-08-10
Attending: OBSTETRICS & GYNECOLOGY
Payer: COMMERCIAL

## 2022-08-10 ENCOUNTER — ANCILLARY PROCEDURE (OUTPATIENT)
Dept: ULTRASOUND IMAGING | Facility: CLINIC | Age: 32
End: 2022-08-10
Attending: OBSTETRICS & GYNECOLOGY
Payer: COMMERCIAL

## 2022-08-10 DIAGNOSIS — O10.919 CHRONIC HYPERTENSION AFFECTING PREGNANCY: ICD-10-CM

## 2022-08-10 DIAGNOSIS — Z98.891 PREVIOUS CESAREAN SECTION: Primary | ICD-10-CM

## 2022-08-10 LAB
ALT SERPL W P-5'-P-CCNC: 23 U/L (ref 0–50)
AST SERPL W P-5'-P-CCNC: 20 U/L (ref 0–45)
CREAT SERPL-MCNC: 0.64 MG/DL (ref 0.52–1.04)
CREAT UR-MCNC: 160 MG/DL
ERYTHROCYTE [DISTWIDTH] IN BLOOD BY AUTOMATED COUNT: 12.9 % (ref 10–15)
GFR SERPL CREATININE-BSD FRML MDRD: >90 ML/MIN/1.73M2
HCT VFR BLD AUTO: 39.8 % (ref 35–47)
HGB BLD-MCNC: 13.1 G/DL (ref 11.7–15.7)
MCH RBC QN AUTO: 28.7 PG (ref 26.5–33)
MCHC RBC AUTO-ENTMCNC: 32.9 G/DL (ref 31.5–36.5)
MCV RBC AUTO: 87 FL (ref 78–100)
PLATELET # BLD AUTO: 182 10E3/UL (ref 150–450)
PROT UR-MCNC: 0.14 G/L
PROT/CREAT 24H UR: 0.09 G/G CR (ref 0–0.2)
RBC # BLD AUTO: 4.56 10E6/UL (ref 3.8–5.2)
WBC # BLD AUTO: 10.7 10E3/UL (ref 4–11)

## 2022-08-10 PROCEDURE — 84156 ASSAY OF PROTEIN URINE: CPT | Performed by: OBSTETRICS & GYNECOLOGY

## 2022-08-10 PROCEDURE — 76819 FETAL BIOPHYS PROFIL W/O NST: CPT

## 2022-08-10 PROCEDURE — 84460 ALANINE AMINO (ALT) (SGPT): CPT | Performed by: OBSTETRICS & GYNECOLOGY

## 2022-08-10 PROCEDURE — 85027 COMPLETE CBC AUTOMATED: CPT | Performed by: OBSTETRICS & GYNECOLOGY

## 2022-08-10 PROCEDURE — 36415 COLL VENOUS BLD VENIPUNCTURE: CPT | Performed by: OBSTETRICS & GYNECOLOGY

## 2022-08-10 PROCEDURE — 84450 TRANSFERASE (AST) (SGOT): CPT | Performed by: OBSTETRICS & GYNECOLOGY

## 2022-08-10 PROCEDURE — 76819 FETAL BIOPHYS PROFIL W/O NST: CPT | Mod: 26 | Performed by: OBSTETRICS & GYNECOLOGY

## 2022-08-10 PROCEDURE — 82565 ASSAY OF CREATININE: CPT

## 2022-08-11 ENCOUNTER — TELEPHONE (OUTPATIENT)
Dept: OBGYN | Facility: CLINIC | Age: 32
End: 2022-08-11

## 2022-08-11 NOTE — TELEPHONE ENCOUNTER
----- Message from Cathi Dela Cruz RN sent at 8/11/2022 10:20 AM CDT -----  Regarding: pregnant and calling about what to take for diarrhea  Patient is pregnant and has diarrhea and would like to know what she can take for this.

## 2022-08-11 NOTE — TELEPHONE ENCOUNTER
Writer called and spoke with patient. Writer encouraged patient to take imodium if loose stools continue. Patient verbalized understanding and agreement to plan.

## 2022-08-12 ENCOUNTER — MYC MEDICAL ADVICE (OUTPATIENT)
Dept: OBGYN | Facility: CLINIC | Age: 32
End: 2022-08-12

## 2022-08-17 ENCOUNTER — TELEPHONE (OUTPATIENT)
Dept: OBGYN | Facility: CLINIC | Age: 32
End: 2022-08-17

## 2022-08-17 NOTE — TELEPHONE ENCOUNTER
[facility] Call Center    Phone Message    May a detailed message be left on voicemail: yes     Reason for Call: Patient is calling regarding an order for a breast pump prescription from [employer].  Pt states [employer] has sent over 3 faxes relating to this issue, but none of been answered.  Please reach out to patient to discuss.  Thank you.    Action Taken: Other: [facility]    Travel Screening: Not Applicable

## 2022-08-19 ENCOUNTER — ANCILLARY PROCEDURE (OUTPATIENT)
Dept: ULTRASOUND IMAGING | Facility: CLINIC | Age: 32
End: 2022-08-19
Attending: OBSTETRICS & GYNECOLOGY
Payer: COMMERCIAL

## 2022-08-19 ENCOUNTER — OFFICE VISIT (OUTPATIENT)
Dept: OBGYN | Facility: CLINIC | Age: 32
End: 2022-08-19
Attending: OBSTETRICS & GYNECOLOGY
Payer: COMMERCIAL

## 2022-08-19 ENCOUNTER — LAB (OUTPATIENT)
Dept: LAB | Facility: CLINIC | Age: 32
End: 2022-08-19
Attending: OBSTETRICS & GYNECOLOGY
Payer: COMMERCIAL

## 2022-08-19 VITALS
DIASTOLIC BLOOD PRESSURE: 108 MMHG | SYSTOLIC BLOOD PRESSURE: 158 MMHG | HEIGHT: 68 IN | BODY MASS INDEX: 31.52 KG/M2 | WEIGHT: 208 LBS

## 2022-08-19 DIAGNOSIS — O92.70 LACTATION DISORDER: ICD-10-CM

## 2022-08-19 DIAGNOSIS — O10.919 HTN IN PREGNANCY, CHRONIC: ICD-10-CM

## 2022-08-19 DIAGNOSIS — O09.93 HRP (HIGH RISK PREGNANCY), THIRD TRIMESTER: Primary | ICD-10-CM

## 2022-08-19 DIAGNOSIS — O10.919 CHRONIC HYPERTENSION AFFECTING PREGNANCY: ICD-10-CM

## 2022-08-19 LAB
ALT SERPL W P-5'-P-CCNC: 45 U/L (ref 0–50)
AST SERPL W P-5'-P-CCNC: 32 U/L (ref 0–45)
CREAT UR-MCNC: 32 MG/DL
ERYTHROCYTE [DISTWIDTH] IN BLOOD BY AUTOMATED COUNT: 13.1 % (ref 10–15)
HCT VFR BLD AUTO: 43 % (ref 35–47)
HGB BLD-MCNC: 14.6 G/DL (ref 11.7–15.7)
MCH RBC QN AUTO: 28.8 PG (ref 26.5–33)
MCHC RBC AUTO-ENTMCNC: 34 G/DL (ref 31.5–36.5)
MCV RBC AUTO: 85 FL (ref 78–100)
PLATELET # BLD AUTO: 200 10E3/UL (ref 150–450)
PROT UR-MCNC: 0.05 G/L
PROT/CREAT 24H UR: 0.16 G/G CR (ref 0–0.2)
RBC # BLD AUTO: 5.07 10E6/UL (ref 3.8–5.2)
URATE SERPL-MCNC: 6.4 MG/DL (ref 2.6–6)
WBC # BLD AUTO: 9.7 10E3/UL (ref 4–11)

## 2022-08-19 PROCEDURE — 36415 COLL VENOUS BLD VENIPUNCTURE: CPT

## 2022-08-19 PROCEDURE — 76820 UMBILICAL ARTERY ECHO: CPT

## 2022-08-19 PROCEDURE — 76816 OB US FOLLOW-UP PER FETUS: CPT

## 2022-08-19 PROCEDURE — 76816 OB US FOLLOW-UP PER FETUS: CPT | Mod: 26 | Performed by: OBSTETRICS & GYNECOLOGY

## 2022-08-19 PROCEDURE — 84460 ALANINE AMINO (ALT) (SGPT): CPT

## 2022-08-19 PROCEDURE — 84450 TRANSFERASE (AST) (SGOT): CPT

## 2022-08-19 PROCEDURE — G0463 HOSPITAL OUTPT CLINIC VISIT: HCPCS | Mod: 25

## 2022-08-19 PROCEDURE — 84156 ASSAY OF PROTEIN URINE: CPT

## 2022-08-19 PROCEDURE — 76820 UMBILICAL ARTERY ECHO: CPT | Mod: 26 | Performed by: OBSTETRICS & GYNECOLOGY

## 2022-08-19 PROCEDURE — 76819 FETAL BIOPHYS PROFIL W/O NST: CPT | Mod: 26 | Performed by: OBSTETRICS & GYNECOLOGY

## 2022-08-19 PROCEDURE — 99207 PR PRENATAL VISIT: CPT | Performed by: ADVANCED PRACTICE MIDWIFE

## 2022-08-19 PROCEDURE — 87653 STREP B DNA AMP PROBE: CPT | Performed by: ADVANCED PRACTICE MIDWIFE

## 2022-08-19 PROCEDURE — 85014 HEMATOCRIT: CPT

## 2022-08-19 PROCEDURE — 84550 ASSAY OF BLOOD/URIC ACID: CPT

## 2022-08-19 RX ORDER — NIFEDIPINE 90 MG/1
90 TABLET, FILM COATED, EXTENDED RELEASE ORAL DAILY
Qty: 30 TABLET | Refills: 0 | Status: SHIPPED | OUTPATIENT
Start: 2022-08-19 | End: 2022-08-19

## 2022-08-19 ASSESSMENT — PAIN SCALES - GENERAL: PAINLEVEL: NO PAIN (0)

## 2022-08-19 NOTE — LETTER
"2022       RE: Rosemarie Johnson  [address] [place] [address]     Dear Colleague,    Thank you for referring your patient, Rosemarie Johnson, to the [facility] [place] at [facility]. Please see a copy of my visit note below.    Subjective:      32 year old  at 35w6d presents for a routine prenatal appointment.     Denies vaginal bleeding,  leakage of fluid, or change in vaginal discharge.  Denies contractions.  Reports regular fetal movement.     No HA, visual changes, RUQ or epigastric pain.   Patient concerns: Diarrhea has now resolved.  Reviewed new diagnosis of FGR.  Reviewed elevated blood pressure readings with Dr. Sharif.  Plan to increase labetalol to TID.  Repeat labs today, plan BPP with dopplers Monday or Tuesday next week.  Patient will continue to check blood pressure at home, aware to call if readings are 140/90.  Consents to GBS screening today.  Feeling well overall.   Objective:  Vitals:    22 1008 22 1121 22 1122   BP: (!) 155/103 (!) 149/113 (!) 158/108   Weight: 94.3 kg (208 lb)     Height: 1.715 m (5' 7.52\")      See OB flowsheet    Assessment/Plan     Encounter Diagnoses   Name Primary?     Lactation disorder      HRP (high risk pregnancy), third trimester Yes     HTN in pregnancy, chronic      Orders Placed This Encounter   Procedures     US OB Fetal Biophys Prf wo NST [employer]     ALT     AST     CBC with platelets     Uric acid     Protein  random urine     Breast Pump Order for DME - ONLY FOR DME     Orders Placed This Encounter   Medications     DISCONTD: NIFEdipine ER (ADALAT CC) 90 MG 24 hr tablet     Sig: Take 1 tablet (90 mg) by mouth daily     Dispense:  30 tablet     Refill:  0       No flowsheet data found.  No flowsheet data found.  GBS screening: Obtained.  Labor signs discussed. Reinforced daily fetal movement counts.  Reviewed why/how to contact provider if " headache/visual changes/RUQ or epigastric pain, decreased fetal movement, vaginal bleeding, leakage of fluid.   Return to clinic in 1 week and prn if questions or concerns.     LASHAY SanchezM

## 2022-08-19 NOTE — PATIENT INSTRUCTIONS
Increase Labetalol to three times daily  Return to clinic for blood pressure check and ultrasound Monday or Tuesday next week.  Repeat lab work today.

## 2022-08-20 LAB — GP B STREP DNA SPEC QL NAA+PROBE: NEGATIVE

## 2022-08-22 NOTE — TELEPHONE ENCOUNTER
Forms for breast pump order were left on providers desk to be signed on 08/22/2022.    Form was signed and faxed on 08/26/2022

## 2022-08-23 ENCOUNTER — ALLIED HEALTH/NURSE VISIT (OUTPATIENT)
Dept: OBGYN | Facility: CLINIC | Age: 32
End: 2022-08-23
Attending: ADVANCED PRACTICE MIDWIFE
Payer: COMMERCIAL

## 2022-08-23 ENCOUNTER — HOSPITAL ENCOUNTER (OUTPATIENT)
Facility: CLINIC | Age: 32
Discharge: HOME | End: 2022-08-23
Attending: OBSTETRICS & GYNECOLOGY | Admitting: OBSTETRICS & GYNECOLOGY
Payer: COMMERCIAL

## 2022-08-23 ENCOUNTER — ANCILLARY PROCEDURE (OUTPATIENT)
Dept: ULTRASOUND IMAGING | Facility: CLINIC | Age: 32
End: 2022-08-23
Attending: ADVANCED PRACTICE MIDWIFE
Payer: COMMERCIAL

## 2022-08-23 VITALS — DIASTOLIC BLOOD PRESSURE: 119 MMHG | SYSTOLIC BLOOD PRESSURE: 163 MMHG | HEART RATE: 73 BPM

## 2022-08-23 VITALS — DIASTOLIC BLOOD PRESSURE: 95 MMHG | SYSTOLIC BLOOD PRESSURE: 135 MMHG

## 2022-08-23 DIAGNOSIS — O10.919 HTN IN PREGNANCY, CHRONIC: ICD-10-CM

## 2022-08-23 DIAGNOSIS — O09.93 HRP (HIGH RISK PREGNANCY), THIRD TRIMESTER: ICD-10-CM

## 2022-08-23 DIAGNOSIS — O10.919 CHRONIC HYPERTENSION AFFECTING PREGNANCY: Primary | ICD-10-CM

## 2022-08-23 PROCEDURE — G0463 HOSPITAL OUTPT CLINIC VISIT: HCPCS | Mod: 25

## 2022-08-23 PROCEDURE — 99207 PR NO BILLABLE SERVICE THIS VISIT: CPT

## 2022-08-23 PROCEDURE — 76819 FETAL BIOPHYS PROFIL W/O NST: CPT | Mod: 26 | Performed by: OBSTETRICS & GYNECOLOGY

## 2022-08-23 PROCEDURE — 76820 UMBILICAL ARTERY ECHO: CPT | Mod: 26 | Performed by: OBSTETRICS & GYNECOLOGY

## 2022-08-23 PROCEDURE — 76819 FETAL BIOPHYS PROFIL W/O NST: CPT

## 2022-08-23 PROCEDURE — 59025 FETAL NON-STRESS TEST: CPT

## 2022-08-23 RX ORDER — MAGNESIUM SULFATE HEPTAHYDRATE 40 MG/ML
4 INJECTION, SOLUTION INTRAVENOUS
Status: CANCELLED | OUTPATIENT
Start: 2022-08-23

## 2022-08-23 RX ORDER — LIDOCAINE 40 MG/G
CREAM TOPICAL
Status: DISCONTINUED | OUTPATIENT
Start: 2022-08-23 | End: 2022-08-23 | Stop reason: HOSPADM

## 2022-08-23 RX ORDER — LORAZEPAM 2 MG/ML
2 INJECTION INTRAMUSCULAR
Status: CANCELLED | OUTPATIENT
Start: 2022-08-23

## 2022-08-23 RX ORDER — SODIUM CHLORIDE, SODIUM LACTATE, POTASSIUM CHLORIDE, CALCIUM CHLORIDE 600; 310; 30; 20 MG/100ML; MG/100ML; MG/100ML; MG/100ML
10-125 INJECTION, SOLUTION INTRAVENOUS CONTINUOUS
Status: CANCELLED | OUTPATIENT
Start: 2022-08-23

## 2022-08-23 RX ORDER — MAGNESIUM SULFATE HEPTAHYDRATE 500 MG/ML
10 INJECTION, SOLUTION INTRAMUSCULAR; INTRAVENOUS
Status: CANCELLED | OUTPATIENT
Start: 2022-08-23

## 2022-08-23 RX ORDER — MAGNESIUM SULFATE HEPTAHYDRATE 40 MG/ML
2 INJECTION, SOLUTION INTRAVENOUS
Status: CANCELLED | OUTPATIENT
Start: 2022-08-23

## 2022-08-23 ASSESSMENT — ACTIVITIES OF DAILY LIVING (ADL)
FALL_HISTORY_WITHIN_LAST_SIX_MONTHS: NO
TOILETING_ISSUES: NO
WEAR_GLASSES_OR_BLIND: YES
HEARING_DIFFICULTY_OR_DEAF: NO
VISION_MANAGEMENT: GLASSES AND CONTACTS
CHANGE_IN_FUNCTIONAL_STATUS_SINCE_ONSET_OF_CURRENT_ILLNESS/INJURY: NO
DIFFICULTY_EATING/SWALLOWING: NO
DRESSING/BATHING_DIFFICULTY: NO
ADLS_ACUITY_SCORE: 20
WALKING_OR_CLIMBING_STAIRS_DIFFICULTY: NO
DOING_ERRANDS_INDEPENDENTLY_DIFFICULTY: NO
CONCENTRATING,_REMEMBERING_OR_MAKING_DECISIONS_DIFFICULTY: NO
DIFFICULTY_COMMUNICATING: NO

## 2022-08-23 NOTE — NURSING NOTE
Rosemarie here for BPP and BP check.  BPP 6/8.  NST not reactive.  BP average 165/115 in sitting position.     Denies headache, blurry vision or epigastric pain.  LE edema present.  [facility] to [facility] to assess.

## 2022-08-23 NOTE — PROGRESS NOTES
[facility]  OB Triage Note    CC: Need for extended fetal monitoring    HPI:   Rosemarie Johnson is a 32 year old  at 36w3d, who presents from clinic following a 6/8 BPP (-2 for gross fetal movement) and a non-reactive NST.      She denies contractions, leaking fluid, and vaginal bleeding. Endorses normal/active fetal movement at present (states she has been feeling a little less movement the last few nights, but it is always more active during the day).     She had a recent admission to the Antepartum service for a cHTN exacerbation (-), during which time she completed a BMZ course. Today, she denies headache, vision changes, chest pain, shortness of breath, abdominal pain, or extremity edema.    She is scheduled for a repeat  delivery at 37w2d on 22.    Pregnancy notable for:  - Chronic Hypertension (PTA Nifedipine 60 BID & Labetalol 200 TID)  - Fetal growth restriction (EFW 8%ile at 35w6d on 22)  - History of SI Preeclampsia  - History of PHTCS  - History of PTD at 30w2d  - History of FGR  - History of PPH 2/2 focal accreta  - Placenta with Left Lateral Succenturiate Lobe    ROS: Negative except as noted above in HPI    O:  Patient Vitals for the past 24 hrs:   BP   22 1025 (!) 135/95       Gen: Well-appearing, NAD  CV: Regular rate, well perfused  Pulm: Non-labored breathing on room air  Abd: Gravid, non-distended  Ext: Trace LE edema bilaterally    SSE: Deferred  SVE: Deferred    NST Procedure:  FHT: Baseline 125, moderate variability, accelerations present, no decelerations  [place]: No ctx in 10 mins        A/P:  Ms. Rosemarie Johnson is a 32 year old  at 36w3d sent from clinic for extended fetal monitoring following 6/8 BPP and non-reactive NST. FHT reactive x1 hour in L&D Triage so overall 8/10  testing today, which is reassuring. Patient without other symptoms or concerns, safe for discharge home with planned  delivery  8/29/22.    # cHTN  - Asymptomatic, low mild range BP in triage  - Continue current BP medication regimen    # FWB:   - FHT reactive x1 hour in L&D triage; normoactive fetal movement    # PNC: Rh positive, Rubella immune, , GBS negative, other prenatal labs wnl, placenta posterior    Dispo:  - Discharge to home with instruction to follow-up in the next week with her primary obstetrics provider - has BPP scheduled for Friday 8/26   - Asked to return if she develops fever >100.4, severe abdominal pain, >10 contractions in an hour, vaginal bleeding, leaking fluid from vagina, decreased fetal movement    Staffed with Dr. Sharif.    Natacha Ramsay MD, PGY-2  11:40 AM  [facility] Obstetrics & Gynecology Residency    Appreciate Dr. Ramsay's note above, patient also seen and examined by me. I agree with the note above.   Marley Sharif MD

## 2022-08-23 NOTE — DISCHARGE INSTRUCTIONS
Discharge Instruction for Undelivered Patients      You were seen for: Fetal Assessment Rule out PreE  We Consulted: Dr. Sharif and Dr. Ramsay  You had (Test or Medicine):NST     Diet:   Drink 8 to 12 glasses of liquids (milk, juice, water) every day.  You may eat meals and snacks.     Activity:  Count fetal kicks everyday (see handout)  Call your doctor or nurse midwife if your baby is moving less than usual.     Call your provider if you notice:  Swelling in your face or increased swelling in your hands or legs.  Headaches that are not relieved by Tylenol (acetaminophen).  Changes in your vision (blurring: seeing spots or stars.)  Nausea (sick to your stomach) and vomiting (throwing up).   Weight gain of 5 pounds or more per week.  Heartburn that doesn't go away.  Signs of bladder infection: pain when you urinate (use the toilet), need to go more often and more urgently.  The bag of neumann (rupture of membranes) breaks, or you notice leaking in your underwear.  Bright red blood in your underwear.  Abdominal (lower belly) or stomach pain.  For first baby: Contractions (tightening) less than 5 minutes apart for one hour or more.  Second (plus) baby: Contractions (tightening) less than 10 minutes apart and getting stronger.  *If less than 34 weeks: Contractions (tightening) more than 6 times in one hour.  Increase or change in vaginal discharge (note the color and amount)      Follow-up:  As scheduled in the clinic

## 2022-08-23 NOTE — PROGRESS NOTES
Rosemarie presents today for BPP and BP check.  BPP 6/8.  NST completed, non-reactive.  Called to Dr Sharif, and [facility] to [facility] for [facility].

## 2022-08-23 NOTE — PROGRESS NOTES
Data: Patient presented to [facility] at 1005.   Reason for maternal/fetal assessment per patient is Rule Out Pre-eclampsia  .  Patient is a . Prenatal record reviewed.      OB History    Para Term  AB Living   2 1 0 1 0 1   SAB IAB Ectopic Multiple Live Births   0 0 0 0 1      # Outcome Date GA Lbr Slava/2nd Weight Sex Delivery Anes PTL Lv   2 Current            1  20 30w2d  0.86 kg (1 lb 14.3 oz) M IAB  N RUI   . Medical history:   Past Medical History:   Diagnosis Date     Anemia of mother during pregnancy, delivered 3/28/2020    Formatting of this note might be different from the original. Pitx3, cytotec, hemabate, txax2 Hb 10.1 (from 13.6) Hemorrhage 1760 total      delivery delivered 3/27/2020    Last Assessment & Plan:  Formatting of this note might be different from the original. Rosemarie Johnson is a 30 year old  who was admitted at 30w1d with pregnancy complicated by chronic hypertension, vasa previa, single umbilical artery, and IUGR, 2%ile. She was admitted on 3/27 from a scheduled ultrasound for prolonged monitoring due to decelerations in the heart rate and intermitted reversed do     Chronic hypertension      Delayed or secondary postpartum hemorrhage 3/28/2020    Formatting of this note might be different from the original. 1003cc after expressed clots. cytotec 800, oxytocinx3     High-risk pregnancy, unspecified trimester 2022     PCOS (polycystic ovarian syndrome)     dx age 12     Placenta accreta 2020    Formatting of this note might be different from the original. Pathology after delivery shows focal placenta accreta.  Patient currently asymptomatic     Vasa previa 2020   . Gestational Age 36w3d. VSS. Fetal movement present. Patient denies cramping, backache, vaginal discharge, pelvic pressure, UTI symptoms, GI problems, bloody show, vaginal bleeding, edema, headache, visual disturbances, epigastric or URQ pain, abdominal pain, rupture of  membranes.   Action: Verbal consent for EFM. Triage assessment completed(see flow record).   Response: Dr. Ramsay informed of arrival. Plan per provider is discharge. Patient verbalized agreement with plan.

## 2022-08-23 NOTE — PROGRESS NOTES
OB History    Para Term  AB Living   2 1 0 1 0 1   SAB IAB Ectopic Multiple Live Births   0 0 0 0 1      # Outcome Date GA Lbr Slava/2nd Weight Sex Delivery Anes PTL Lv   2 Current            1  20 30w2d  0.86 kg (1 lb 14.3 oz) M IAB  N RUI      Medical History:   Past Medical History:   Diagnosis Date     Anemia of mother during pregnancy, delivered 3/28/2020    Formatting of this note might be different from the original. Pitx3, cytotec, hemabate, txax2 Hb 10.1 (from 13.6) Hemorrhage 1760 total      delivery delivered 3/27/2020    Last Assessment & Plan:  Formatting of this note might be different from the original. Rosemarie Johnson is a 30 year old  who was admitted at 30w1d with pregnancy complicated by chronic hypertension, vasa previa, single umbilical artery, and IUGR, 2%ile. She was admitted on 3/27 from a scheduled ultrasound for prolonged monitoring due to decelerations in the heart rate and intermitted reversed do     Chronic hypertension      Delayed or secondary postpartum hemorrhage 3/28/2020    Formatting of this note might be different from the original. 1003cc after expressed clots. cytotec 800, oxytocinx3     High-risk pregnancy, unspecified trimester 2022     PCOS (polycystic ovarian syndrome)     dx age 12     Placenta accreta 2020    Formatting of this note might be different from the original. Pathology after delivery shows focal placenta accreta.  Patient currently asymptomatic     Vasa previa 2020   . Gestational Age 36w3d.  Fetal movement present. Patient denies cramping, backache, vaginal discharge, pelvic pressure, UTI symptoms, GI problems, bloody show, vaginal bleeding, edema, headache, visual disturbances, epigastric or URQ pain, abdominal pain, rupture of membranes.  Verbal consent for EFM. NST conducted. Presumed adequate fetal oxygenation documented (see flow record). Patient education pamphlets given on fetal movement counts.  Patient instructed to report change in fetal movement, vaginal leaking of fluid or bleeding, abdominal pain, or any concerns related to the pregnancy to her nurse/physician.   Discharged ambulatory at 1140.

## 2022-08-26 ENCOUNTER — ANCILLARY PROCEDURE (OUTPATIENT)
Dept: ULTRASOUND IMAGING | Facility: CLINIC | Age: 32
End: 2022-08-26
Attending: OBSTETRICS & GYNECOLOGY
Payer: COMMERCIAL

## 2022-08-26 ENCOUNTER — OFFICE VISIT (OUTPATIENT)
Dept: OBGYN | Facility: CLINIC | Age: 32
End: 2022-08-26
Attending: OBSTETRICS & GYNECOLOGY
Payer: COMMERCIAL

## 2022-08-26 ENCOUNTER — LAB (OUTPATIENT)
Dept: LAB | Facility: CLINIC | Age: 32
End: 2022-08-26
Payer: COMMERCIAL

## 2022-08-26 VITALS
SYSTOLIC BLOOD PRESSURE: 143 MMHG | BODY MASS INDEX: 32.28 KG/M2 | DIASTOLIC BLOOD PRESSURE: 93 MMHG | HEIGHT: 68 IN | WEIGHT: 213 LBS

## 2022-08-26 DIAGNOSIS — O10.919 CHRONIC HYPERTENSION AFFECTING PREGNANCY: ICD-10-CM

## 2022-08-26 DIAGNOSIS — Z01.812 ENCOUNTER FOR PRE-OPERATIVE LABORATORY TESTING: ICD-10-CM

## 2022-08-26 DIAGNOSIS — O36.5990 PREGNANCY AFFECTED BY FETAL GROWTH RESTRICTION: Primary | ICD-10-CM

## 2022-08-26 DIAGNOSIS — O36.5990 PREGNANCY AFFECTED BY FETAL GROWTH RESTRICTION: ICD-10-CM

## 2022-08-26 PROCEDURE — 99207 PR SERVICE NOT STAFFED W/SUPERV PROV: CPT | Performed by: STUDENT IN AN ORGANIZED HEALTH CARE EDUCATION/TRAINING PROGRAM

## 2022-08-26 PROCEDURE — U0005 INFEC AGEN DETEC AMPLI PROBE: HCPCS

## 2022-08-26 PROCEDURE — U0003 INFECTIOUS AGENT DETECTION BY NUCLEIC ACID (DNA OR RNA); SEVERE ACUTE RESPIRATORY SYNDROME CORONAVIRUS 2 (SARS-COV-2) (CORONAVIRUS DISEASE [COVID-19]), AMPLIFIED PROBE TECHNIQUE, MAKING USE OF HIGH THROUGHPUT TECHNOLOGIES AS DESCRIBED BY CMS-2020-01-R: HCPCS

## 2022-08-26 PROCEDURE — G0463 HOSPITAL OUTPT CLINIC VISIT: HCPCS | Mod: 25

## 2022-08-26 PROCEDURE — 76819 FETAL BIOPHYS PROFIL W/O NST: CPT

## 2022-08-26 PROCEDURE — 76819 FETAL BIOPHYS PROFIL W/O NST: CPT | Mod: 26 | Performed by: OBSTETRICS & GYNECOLOGY

## 2022-08-26 PROCEDURE — 76820 UMBILICAL ARTERY ECHO: CPT

## 2022-08-26 ASSESSMENT — PAIN SCALES - GENERAL: PAINLEVEL: NO PAIN (0)

## 2022-08-26 NOTE — LETTER
2022       RE: Rosemarie Johnson  [address] [place] [address]     Dear Colleague,    Thank you for referring your patient, Rosemarie Johnson, to the [facility] [place] at [facility]. Please see a copy of my visit note below.    [facility]  Return OB Visit    S: Denies vaginal bleeding, vaginal discharge, LOF, contractions.  Reports good fetal movement. Denies headache, vision changes, RUQ pain, chest pain, SOB.    Pregnancy notable for  - Chronic hypertension  - Fetal growth restriction (also h/o FGR)  - H/o SI preeclamspia  - H/o PHTCS  - H/o PTD at 30w2d  - H/o PPH due to focal accreta  - Placenta with left lateral succenturiate lobe      O: LMP 2021   Weight gain: 15.9 kg (35 lb)    Gen: Well-appearing, NAD    Fundal Height:  34cm  FHR: See BPP, reassuring    A/P:  Rosemarie Johnson is a 32 year old  at 36w6d by LMP c/w 7w4d US, here for return OB visit.    Chronic hypertension  - PTA nifedipine XL 60mg BID, labetalol 200mg TID  - HELLP labs nl on , UPC 0.16  - BPP  today, however UAD are elevated - noted this after patient left. Called patient and recommended delivery via  section tomorrow (). Patient will be 37w0d. Patient agreeable, charge RN aware.    H/o PPH d/t focal accreta  - Type and cross patient on arrival tp L&D d/t history of focal accreta and PPH    Prenatal care  - Rh pos, yong neg, rubella imm, infectious labs within normal limits, GCT nl  - GBS negative (22)  - S/p TDAP    Plan for delivery tomorrow    Staffed with Dr. Cortney Gallo MD  Obstetrics & Gynecology, PGY-3  2022 1:30 PM

## 2022-08-26 NOTE — PROGRESS NOTES
[facility] [place] [facility]  Return OB Visit    S: Denies vaginal bleeding, vaginal discharge, LOF, contractions.  Reports good fetal movement. Denies headache, vision changes, RUQ pain, chest pain, SOB.    Pregnancy notable for  - Chronic hypertension  - Fetal growth restriction (also h/o FGR)  - H/o SI preeclamspia  - H/o PHTCS  - H/o PTD at 30w2d  - H/o PPH due to focal accreta  - Placenta with left lateral succenturiate lobe      O: LMP 2021   Weight gain: 15.9 kg (35 lb)    Gen: Well-appearing, NAD    Fundal Height:  34cm  FHR: See BPP, reassuring    A/P:  Rosemarie Johnson is a 32 year old  at 36w6d by LMP c/w 7w4d US, here for return OB visit.    Chronic hypertension  - PTA nifedipine XL 60mg BID, labetalol 200mg TID  - HELLP labs nl on , UPC 0.16  - BPP  today, however UAD are elevated - noted this after patient left. Called patient and recommended delivery via  section tomorrow (). Patient will be 37w0d. Patient agreeable, charge RN aware.    H/o PPH d/t focal accreta  - Type and cross patient on arrival tp L&D d/t history of focal accreta and PPH    Prenatal care  - Rh pos, yong neg, rubella imm, infectious labs within normal limits, GCT nl  - GBS negative (22)  - S/p TDAP    Plan for delivery tomorrow    Staffed with Dr. Cortney Gallo MD  Obstetrics & Gynecology, PGY-3  2022 1:30 PM

## 2022-08-27 ENCOUNTER — ANESTHESIA (OUTPATIENT)
Dept: OBGYN | Facility: CLINIC | Age: 32
End: 2022-08-27
Payer: COMMERCIAL

## 2022-08-27 ENCOUNTER — ANESTHESIA EVENT (OUTPATIENT)
Dept: OBGYN | Facility: CLINIC | Age: 32
End: 2022-08-27
Payer: COMMERCIAL

## 2022-08-27 ENCOUNTER — HOSPITAL ENCOUNTER (INPATIENT)
Facility: CLINIC | Age: 32
LOS: 3 days | Discharge: HOME | End: 2022-08-30
Attending: OBSTETRICS & GYNECOLOGY | Admitting: OBSTETRICS & GYNECOLOGY
Payer: COMMERCIAL

## 2022-08-27 DIAGNOSIS — O10.919 CHRONIC HYPERTENSION AFFECTING PREGNANCY: ICD-10-CM

## 2022-08-27 DIAGNOSIS — Z98.891 STATUS POST CESAREAN SECTION: Primary | ICD-10-CM

## 2022-08-27 DIAGNOSIS — Z98.891 HISTORY OF CLASSICAL CESAREAN SECTION: ICD-10-CM

## 2022-08-27 DIAGNOSIS — D62 ANEMIA DUE TO BLOOD LOSS, ACUTE: ICD-10-CM

## 2022-08-27 DIAGNOSIS — G89.18 ACUTE POST-OPERATIVE PAIN: ICD-10-CM

## 2022-08-27 DIAGNOSIS — Z98.891 S/P CESAREAN SECTION: ICD-10-CM

## 2022-08-27 LAB
ABO/RH(D): NORMAL
ALT SERPL W P-5'-P-CCNC: 25 U/L (ref 0–50)
ANTIBODY SCREEN: NEGATIVE
AST SERPL W P-5'-P-CCNC: 20 U/L (ref 0–45)
BASOPHILS # BLD AUTO: 0 10E3/UL (ref 0–0.2)
BASOPHILS NFR BLD AUTO: 0 %
BLD PROD TYP BPU: NORMAL
BLD PROD TYP BPU: NORMAL
BLOOD COMPONENT TYPE: NORMAL
BLOOD COMPONENT TYPE: NORMAL
CODING SYSTEM: NORMAL
CODING SYSTEM: NORMAL
CREAT SERPL-MCNC: 0.63 MG/DL (ref 0.52–1.04)
CROSSMATCH: NORMAL
CROSSMATCH: NORMAL
EOSINOPHIL # BLD AUTO: 0.1 10E3/UL (ref 0–0.7)
EOSINOPHIL NFR BLD AUTO: 1 %
ERYTHROCYTE [DISTWIDTH] IN BLOOD BY AUTOMATED COUNT: 13.1 % (ref 10–15)
GFR SERPL CREATININE-BSD FRML MDRD: >90 ML/MIN/1.73M2
HCT VFR BLD AUTO: 38.2 % (ref 35–47)
HGB BLD-MCNC: 12.9 G/DL (ref 11.7–15.7)
IMM GRANULOCYTES # BLD: 0.1 10E3/UL
IMM GRANULOCYTES NFR BLD: 1 %
LYMPHOCYTES # BLD AUTO: 2.6 10E3/UL (ref 0.8–5.3)
LYMPHOCYTES NFR BLD AUTO: 26 %
MCH RBC QN AUTO: 28.8 PG (ref 26.5–33)
MCHC RBC AUTO-ENTMCNC: 33.8 G/DL (ref 31.5–36.5)
MCV RBC AUTO: 85 FL (ref 78–100)
MONOCYTES # BLD AUTO: 0.7 10E3/UL (ref 0–1.3)
MONOCYTES NFR BLD AUTO: 7 %
NEUTROPHILS # BLD AUTO: 6.5 10E3/UL (ref 1.6–8.3)
NEUTROPHILS NFR BLD AUTO: 65 %
NRBC # BLD AUTO: 0 10E3/UL
NRBC BLD AUTO-RTO: 0 /100
PLATELET # BLD AUTO: 190 10E3/UL (ref 150–450)
RBC # BLD AUTO: 4.48 10E6/UL (ref 3.8–5.2)
SARS-COV-2 RNA RESP QL NAA+PROBE: NEGATIVE
SARS-COV-2 RNA RESP QL NAA+PROBE: NEGATIVE
SPECIMEN EXPIRATION DATE: NORMAL
T PALLIDUM AB SER QL: NONREACTIVE
UNIT ABO/RH: NORMAL
UNIT ABO/RH: NORMAL
UNIT NUMBER: NORMAL
UNIT NUMBER: NORMAL
UNIT STATUS: NORMAL
UNIT STATUS: NORMAL
UNIT TYPE ISBT: 6200
UNIT TYPE ISBT: 6200
WBC # BLD AUTO: 9.9 10E3/UL (ref 4–11)

## 2022-08-27 PROCEDURE — 250N000011 HC RX IP 250 OP 636

## 2022-08-27 PROCEDURE — 250N000011 HC RX IP 250 OP 636: Performed by: ANESTHESIOLOGY

## 2022-08-27 PROCEDURE — 82565 ASSAY OF CREATININE: CPT | Performed by: STUDENT IN AN ORGANIZED HEALTH CARE EDUCATION/TRAINING PROGRAM

## 2022-08-27 PROCEDURE — 710N000010 HC RECOVERY PHASE 1, LEVEL 2, PER MIN: Performed by: OBSTETRICS & GYNECOLOGY

## 2022-08-27 PROCEDURE — U0003 INFECTIOUS AGENT DETECTION BY NUCLEIC ACID (DNA OR RNA); SEVERE ACUTE RESPIRATORY SYNDROME CORONAVIRUS 2 (SARS-COV-2) (CORONAVIRUS DISEASE [COVID-19]), AMPLIFIED PROBE TECHNIQUE, MAKING USE OF HIGH THROUGHPUT TECHNOLOGIES AS DESCRIBED BY CMS-2020-01-R: HCPCS | Performed by: STUDENT IN AN ORGANIZED HEALTH CARE EDUCATION/TRAINING PROGRAM

## 2022-08-27 PROCEDURE — 86850 RBC ANTIBODY SCREEN: CPT | Performed by: OBSTETRICS & GYNECOLOGY

## 2022-08-27 PROCEDURE — 36415 COLL VENOUS BLD VENIPUNCTURE: CPT | Performed by: STUDENT IN AN ORGANIZED HEALTH CARE EDUCATION/TRAINING PROGRAM

## 2022-08-27 PROCEDURE — C9290 INJ, BUPIVACAINE LIPOSOME: HCPCS | Performed by: ANESTHESIOLOGY

## 2022-08-27 PROCEDURE — 250N000009 HC RX 250: Performed by: OBSTETRICS & GYNECOLOGY

## 2022-08-27 PROCEDURE — 258N000003 HC RX IP 258 OP 636: Performed by: STUDENT IN AN ORGANIZED HEALTH CARE EDUCATION/TRAINING PROGRAM

## 2022-08-27 PROCEDURE — 271N000001 HC OR GENERAL SUPPLY NON-STERILE: Performed by: OBSTETRICS & GYNECOLOGY

## 2022-08-27 PROCEDURE — 84460 ALANINE AMINO (ALT) (SGPT): CPT | Performed by: STUDENT IN AN ORGANIZED HEALTH CARE EDUCATION/TRAINING PROGRAM

## 2022-08-27 PROCEDURE — 86780 TREPONEMA PALLIDUM: CPT | Performed by: OBSTETRICS & GYNECOLOGY

## 2022-08-27 PROCEDURE — 250N000011 HC RX IP 250 OP 636: Performed by: STUDENT IN AN ORGANIZED HEALTH CARE EDUCATION/TRAINING PROGRAM

## 2022-08-27 PROCEDURE — 250N000011 HC RX IP 250 OP 636: Performed by: OBSTETRICS & GYNECOLOGY

## 2022-08-27 PROCEDURE — 272N000001 HC OR GENERAL SUPPLY STERILE: Performed by: OBSTETRICS & GYNECOLOGY

## 2022-08-27 PROCEDURE — 370N000017 HC ANESTHESIA TECHNICAL FEE, PER MIN: Performed by: OBSTETRICS & GYNECOLOGY

## 2022-08-27 PROCEDURE — 250N000013 HC RX MED GY IP 250 OP 250 PS 637

## 2022-08-27 PROCEDURE — 360N000076 HC SURGERY LEVEL 3, PER MIN: Performed by: OBSTETRICS & GYNECOLOGY

## 2022-08-27 PROCEDURE — 120N000002 HC R&B MED SURG/OB UMMC

## 2022-08-27 PROCEDURE — 999N000141 HC STATISTIC PRE-PROCEDURE NURSING ASSESSMENT: Performed by: OBSTETRICS & GYNECOLOGY

## 2022-08-27 PROCEDURE — 250N000013 HC RX MED GY IP 250 OP 250 PS 637: Performed by: STUDENT IN AN ORGANIZED HEALTH CARE EDUCATION/TRAINING PROGRAM

## 2022-08-27 PROCEDURE — 84450 TRANSFERASE (AST) (SGOT): CPT | Performed by: STUDENT IN AN ORGANIZED HEALTH CARE EDUCATION/TRAINING PROGRAM

## 2022-08-27 PROCEDURE — 258N000003 HC RX IP 258 OP 636

## 2022-08-27 PROCEDURE — 85025 COMPLETE CBC W/AUTO DIFF WBC: CPT | Performed by: OBSTETRICS & GYNECOLOGY

## 2022-08-27 PROCEDURE — 250N000009 HC RX 250: Performed by: STUDENT IN AN ORGANIZED HEALTH CARE EDUCATION/TRAINING PROGRAM

## 2022-08-27 PROCEDURE — 59510 CESAREAN DELIVERY: CPT | Mod: GC | Performed by: OBSTETRICS & GYNECOLOGY

## 2022-08-27 PROCEDURE — 88307 TISSUE EXAM BY PATHOLOGIST: CPT | Mod: 26 | Performed by: PATHOLOGY

## 2022-08-27 PROCEDURE — 86923 COMPATIBILITY TEST ELECTRIC: CPT | Performed by: STUDENT IN AN ORGANIZED HEALTH CARE EDUCATION/TRAINING PROGRAM

## 2022-08-27 PROCEDURE — 250N000013 HC RX MED GY IP 250 OP 250 PS 637: Performed by: OBSTETRICS & GYNECOLOGY

## 2022-08-27 PROCEDURE — 88307 TISSUE EXAM BY PATHOLOGIST: CPT | Mod: TC

## 2022-08-27 PROCEDURE — 86901 BLOOD TYPING SEROLOGIC RH(D): CPT | Performed by: OBSTETRICS & GYNECOLOGY

## 2022-08-27 RX ORDER — MISOPROSTOL 200 UG/1
800 TABLET ORAL
Status: DISCONTINUED | OUTPATIENT
Start: 2022-08-27 | End: 2022-08-27 | Stop reason: HOSPADM

## 2022-08-27 RX ORDER — KETOROLAC TROMETHAMINE 30 MG/ML
30 INJECTION, SOLUTION INTRAMUSCULAR; INTRAVENOUS EVERY 6 HOURS
Status: COMPLETED | OUTPATIENT
Start: 2022-08-27 | End: 2022-08-28

## 2022-08-27 RX ORDER — TRANEXAMIC ACID 10 MG/ML
1 INJECTION, SOLUTION INTRAVENOUS EVERY 30 MIN PRN
Status: DISCONTINUED | OUTPATIENT
Start: 2022-08-27 | End: 2022-08-30 | Stop reason: HOSPADM

## 2022-08-27 RX ORDER — DEXTROSE, SODIUM CHLORIDE, SODIUM LACTATE, POTASSIUM CHLORIDE, AND CALCIUM CHLORIDE 5; .6; .31; .03; .02 G/100ML; G/100ML; G/100ML; G/100ML; G/100ML
INJECTION, SOLUTION INTRAVENOUS CONTINUOUS
Status: DISCONTINUED | OUTPATIENT
Start: 2022-08-27 | End: 2022-08-30 | Stop reason: HOSPADM

## 2022-08-27 RX ORDER — NALOXONE HYDROCHLORIDE 0.4 MG/ML
0.4 INJECTION, SOLUTION INTRAMUSCULAR; INTRAVENOUS; SUBCUTANEOUS
Status: DISCONTINUED | OUTPATIENT
Start: 2022-08-27 | End: 2022-08-30 | Stop reason: HOSPADM

## 2022-08-27 RX ORDER — NIFEDIPINE 30 MG/1
60 TABLET, EXTENDED RELEASE ORAL 2 TIMES DAILY
Status: DISCONTINUED | OUTPATIENT
Start: 2022-08-27 | End: 2022-08-28

## 2022-08-27 RX ORDER — DIPHENHYDRAMINE HCL 25 MG
25 CAPSULE ORAL EVERY 6 HOURS PRN
Status: DISCONTINUED | OUTPATIENT
Start: 2022-08-27 | End: 2022-08-30 | Stop reason: HOSPADM

## 2022-08-27 RX ORDER — HYDROCORTISONE 25 MG/G
CREAM TOPICAL 3 TIMES DAILY PRN
Status: DISCONTINUED | OUTPATIENT
Start: 2022-08-27 | End: 2022-08-30 | Stop reason: HOSPADM

## 2022-08-27 RX ORDER — MAGNESIUM SULFATE HEPTAHYDRATE 40 MG/ML
4 INJECTION, SOLUTION INTRAVENOUS ONCE
Status: DISCONTINUED | OUTPATIENT
Start: 2022-08-27 | End: 2022-08-27

## 2022-08-27 RX ORDER — AMOXICILLIN 250 MG
1 CAPSULE ORAL 2 TIMES DAILY
Status: DISCONTINUED | OUTPATIENT
Start: 2022-08-27 | End: 2022-08-30 | Stop reason: HOSPADM

## 2022-08-27 RX ORDER — OXYTOCIN/0.9 % SODIUM CHLORIDE 30/500 ML
340 PLASTIC BAG, INJECTION (ML) INTRAVENOUS CONTINUOUS PRN
Status: DISCONTINUED | OUTPATIENT
Start: 2022-08-27 | End: 2022-08-30 | Stop reason: HOSPADM

## 2022-08-27 RX ORDER — AMOXICILLIN 250 MG
2 CAPSULE ORAL 2 TIMES DAILY
Status: DISCONTINUED | OUTPATIENT
Start: 2022-08-27 | End: 2022-08-30 | Stop reason: HOSPADM

## 2022-08-27 RX ORDER — ONDANSETRON 4 MG/1
4 TABLET, ORALLY DISINTEGRATING ORAL EVERY 6 HOURS PRN
Status: DISCONTINUED | OUTPATIENT
Start: 2022-08-27 | End: 2022-08-30 | Stop reason: HOSPADM

## 2022-08-27 RX ORDER — LIDOCAINE 40 MG/G
CREAM TOPICAL
Status: DISCONTINUED | OUTPATIENT
Start: 2022-08-27 | End: 2022-08-27

## 2022-08-27 RX ORDER — MAGNESIUM SULFATE HEPTAHYDRATE 500 MG/ML
10 INJECTION, SOLUTION INTRAMUSCULAR; INTRAVENOUS
Status: DISCONTINUED | OUTPATIENT
Start: 2022-08-27 | End: 2022-08-30 | Stop reason: HOSPADM

## 2022-08-27 RX ORDER — MAGNESIUM SULFATE IN WATER 40 MG/ML
2 INJECTION, SOLUTION INTRAVENOUS CONTINUOUS
Status: DISPENSED | OUTPATIENT
Start: 2022-08-27 | End: 2022-08-28

## 2022-08-27 RX ORDER — CEFAZOLIN SODIUM/WATER 2 G/20 ML
2 SYRINGE (ML) INTRAVENOUS
Status: COMPLETED | OUTPATIENT
Start: 2022-08-27 | End: 2022-08-27

## 2022-08-27 RX ORDER — MORPHINE SULFATE 1 MG/ML
INJECTION, SOLUTION EPIDURAL; INTRATHECAL; INTRAVENOUS
Status: COMPLETED | OUTPATIENT
Start: 2022-08-27 | End: 2022-08-27

## 2022-08-27 RX ORDER — HYDRALAZINE HYDROCHLORIDE 20 MG/ML
10 INJECTION INTRAMUSCULAR; INTRAVENOUS
Status: DISCONTINUED | OUTPATIENT
Start: 2022-08-27 | End: 2022-08-30 | Stop reason: HOSPADM

## 2022-08-27 RX ORDER — LABETALOL HYDROCHLORIDE 5 MG/ML
20-80 INJECTION, SOLUTION INTRAVENOUS EVERY 10 MIN PRN
Status: DISCONTINUED | OUTPATIENT
Start: 2022-08-27 | End: 2022-08-27

## 2022-08-27 RX ORDER — NALOXONE HYDROCHLORIDE 0.4 MG/ML
0.2 INJECTION, SOLUTION INTRAMUSCULAR; INTRAVENOUS; SUBCUTANEOUS
Status: DISCONTINUED | OUTPATIENT
Start: 2022-08-27 | End: 2022-08-30 | Stop reason: HOSPADM

## 2022-08-27 RX ORDER — LORAZEPAM 2 MG/ML
2 INJECTION INTRAMUSCULAR
Status: DISCONTINUED | OUTPATIENT
Start: 2022-08-27 | End: 2022-08-30 | Stop reason: HOSPADM

## 2022-08-27 RX ORDER — SODIUM CHLORIDE, SODIUM LACTATE, POTASSIUM CHLORIDE, CALCIUM CHLORIDE 600; 310; 30; 20 MG/100ML; MG/100ML; MG/100ML; MG/100ML
INJECTION, SOLUTION INTRAVENOUS CONTINUOUS
Status: DISCONTINUED | OUTPATIENT
Start: 2022-08-27 | End: 2022-08-27 | Stop reason: HOSPADM

## 2022-08-27 RX ORDER — MAGNESIUM SULFATE HEPTAHYDRATE 40 MG/ML
2 INJECTION, SOLUTION INTRAVENOUS
Status: DISCONTINUED | OUTPATIENT
Start: 2022-08-27 | End: 2022-08-30 | Stop reason: HOSPADM

## 2022-08-27 RX ORDER — CARBOPROST TROMETHAMINE 250 UG/ML
250 INJECTION, SOLUTION INTRAMUSCULAR
Status: DISCONTINUED | OUTPATIENT
Start: 2022-08-27 | End: 2022-08-30 | Stop reason: HOSPADM

## 2022-08-27 RX ORDER — MISOPROSTOL 200 UG/1
400 TABLET ORAL
Status: DISCONTINUED | OUTPATIENT
Start: 2022-08-27 | End: 2022-08-30 | Stop reason: HOSPADM

## 2022-08-27 RX ORDER — METOCLOPRAMIDE 10 MG/1
10 TABLET ORAL EVERY 6 HOURS PRN
Status: DISCONTINUED | OUTPATIENT
Start: 2022-08-27 | End: 2022-08-30 | Stop reason: HOSPADM

## 2022-08-27 RX ORDER — OXYTOCIN/0.9 % SODIUM CHLORIDE 30/500 ML
340 PLASTIC BAG, INJECTION (ML) INTRAVENOUS CONTINUOUS PRN
Status: DISCONTINUED | OUTPATIENT
Start: 2022-08-27 | End: 2022-08-27 | Stop reason: HOSPADM

## 2022-08-27 RX ORDER — NIFEDIPINE 30 MG/1
60 TABLET, EXTENDED RELEASE ORAL 2 TIMES DAILY
Status: DISCONTINUED | OUTPATIENT
Start: 2022-08-27 | End: 2022-08-27

## 2022-08-27 RX ORDER — TRANEXAMIC ACID 10 MG/ML
1 INJECTION, SOLUTION INTRAVENOUS EVERY 30 MIN PRN
Status: DISCONTINUED | OUTPATIENT
Start: 2022-08-27 | End: 2022-08-27 | Stop reason: HOSPADM

## 2022-08-27 RX ORDER — LORAZEPAM 2 MG/ML
2 INJECTION INTRAMUSCULAR
Status: DISCONTINUED | OUTPATIENT
Start: 2022-08-27 | End: 2022-08-27

## 2022-08-27 RX ORDER — MAGNESIUM SULFATE IN WATER 40 MG/ML
2 INJECTION, SOLUTION INTRAVENOUS CONTINUOUS
Status: DISCONTINUED | OUTPATIENT
Start: 2022-08-27 | End: 2022-08-27

## 2022-08-27 RX ORDER — BUPIVACAINE HYDROCHLORIDE 7.5 MG/ML
INJECTION, SOLUTION INTRASPINAL
Status: COMPLETED | OUTPATIENT
Start: 2022-08-27 | End: 2022-08-27

## 2022-08-27 RX ORDER — OXYTOCIN/0.9 % SODIUM CHLORIDE 30/500 ML
PLASTIC BAG, INJECTION (ML) INTRAVENOUS CONTINUOUS PRN
Status: DISCONTINUED | OUTPATIENT
Start: 2022-08-27 | End: 2022-08-27

## 2022-08-27 RX ORDER — LABETALOL 200 MG/1
200 TABLET, FILM COATED ORAL 3 TIMES DAILY
Status: DISCONTINUED | OUTPATIENT
Start: 2022-08-27 | End: 2022-08-28

## 2022-08-27 RX ORDER — OXYTOCIN 10 [USP'U]/ML
10 INJECTION, SOLUTION INTRAMUSCULAR; INTRAVENOUS
Status: DISCONTINUED | OUTPATIENT
Start: 2022-08-27 | End: 2022-08-30 | Stop reason: HOSPADM

## 2022-08-27 RX ORDER — METOCLOPRAMIDE HYDROCHLORIDE 5 MG/ML
10 INJECTION INTRAMUSCULAR; INTRAVENOUS EVERY 6 HOURS PRN
Status: DISCONTINUED | OUTPATIENT
Start: 2022-08-27 | End: 2022-08-30 | Stop reason: HOSPADM

## 2022-08-27 RX ORDER — IBUPROFEN 800 MG/1
800 TABLET, FILM COATED ORAL EVERY 6 HOURS
Status: DISCONTINUED | OUTPATIENT
Start: 2022-08-28 | End: 2022-08-30 | Stop reason: HOSPADM

## 2022-08-27 RX ORDER — METHYLERGONOVINE MALEATE 0.2 MG/ML
200 INJECTION INTRAVENOUS
Status: DISCONTINUED | OUTPATIENT
Start: 2022-08-27 | End: 2022-08-30 | Stop reason: HOSPADM

## 2022-08-27 RX ORDER — KETOROLAC TROMETHAMINE 30 MG/ML
INJECTION, SOLUTION INTRAMUSCULAR; INTRAVENOUS PRN
Status: DISCONTINUED | OUTPATIENT
Start: 2022-08-27 | End: 2022-08-27

## 2022-08-27 RX ORDER — CALCIUM GLUCONATE 94 MG/ML
1 INJECTION, SOLUTION INTRAVENOUS
Status: DISCONTINUED | OUTPATIENT
Start: 2022-08-27 | End: 2022-08-27

## 2022-08-27 RX ORDER — OXYCODONE HYDROCHLORIDE 5 MG/1
5 TABLET ORAL EVERY 4 HOURS PRN
Status: DISCONTINUED | OUTPATIENT
Start: 2022-08-27 | End: 2022-08-30 | Stop reason: HOSPADM

## 2022-08-27 RX ORDER — SODIUM CHLORIDE, SODIUM LACTATE, POTASSIUM CHLORIDE, CALCIUM CHLORIDE 600; 310; 30; 20 MG/100ML; MG/100ML; MG/100ML; MG/100ML
10-125 INJECTION, SOLUTION INTRAVENOUS CONTINUOUS
Status: DISCONTINUED | OUTPATIENT
Start: 2022-08-27 | End: 2022-08-30 | Stop reason: HOSPADM

## 2022-08-27 RX ORDER — DIPHENHYDRAMINE HYDROCHLORIDE 50 MG/ML
25 INJECTION INTRAMUSCULAR; INTRAVENOUS EVERY 6 HOURS PRN
Status: DISCONTINUED | OUTPATIENT
Start: 2022-08-27 | End: 2022-08-30 | Stop reason: HOSPADM

## 2022-08-27 RX ORDER — MODIFIED LANOLIN
OINTMENT (GRAM) TOPICAL
Status: DISCONTINUED | OUTPATIENT
Start: 2022-08-27 | End: 2022-08-30 | Stop reason: HOSPADM

## 2022-08-27 RX ORDER — PROCHLORPERAZINE MALEATE 10 MG
10 TABLET ORAL EVERY 6 HOURS PRN
Status: DISCONTINUED | OUTPATIENT
Start: 2022-08-27 | End: 2022-08-30 | Stop reason: HOSPADM

## 2022-08-27 RX ORDER — HYDRALAZINE HYDROCHLORIDE 20 MG/ML
10 INJECTION INTRAMUSCULAR; INTRAVENOUS
Status: DISCONTINUED | OUTPATIENT
Start: 2022-08-27 | End: 2022-08-27

## 2022-08-27 RX ORDER — ONDANSETRON 4 MG/1
4 TABLET, ORALLY DISINTEGRATING ORAL EVERY 30 MIN PRN
Status: DISCONTINUED | OUTPATIENT
Start: 2022-08-27 | End: 2022-08-27 | Stop reason: HOSPADM

## 2022-08-27 RX ORDER — MISOPROSTOL 200 UG/1
400 TABLET ORAL
Status: DISCONTINUED | OUTPATIENT
Start: 2022-08-27 | End: 2022-08-27 | Stop reason: HOSPADM

## 2022-08-27 RX ORDER — LABETALOL HYDROCHLORIDE 5 MG/ML
10 INJECTION, SOLUTION INTRAVENOUS
Status: DISCONTINUED | OUTPATIENT
Start: 2022-08-27 | End: 2022-08-27 | Stop reason: HOSPADM

## 2022-08-27 RX ORDER — MISOPROSTOL 200 UG/1
800 TABLET ORAL
Status: DISCONTINUED | OUTPATIENT
Start: 2022-08-27 | End: 2022-08-30 | Stop reason: HOSPADM

## 2022-08-27 RX ORDER — FENTANYL CITRATE 50 UG/ML
25 INJECTION, SOLUTION INTRAMUSCULAR; INTRAVENOUS EVERY 5 MIN PRN
Status: DISCONTINUED | OUTPATIENT
Start: 2022-08-27 | End: 2022-08-27 | Stop reason: HOSPADM

## 2022-08-27 RX ORDER — BUPIVACAINE HYDROCHLORIDE 2.5 MG/ML
INJECTION, SOLUTION EPIDURAL; INFILTRATION; INTRACAUDAL
Status: DISCONTINUED | OUTPATIENT
Start: 2022-08-27 | End: 2022-08-27

## 2022-08-27 RX ORDER — NIFEDIPINE 30 MG/1
60 TABLET, EXTENDED RELEASE ORAL EVERY 12 HOURS
Status: DISCONTINUED | OUTPATIENT
Start: 2022-08-27 | End: 2022-08-27

## 2022-08-27 RX ORDER — CALCIUM GLUCONATE 94 MG/ML
1 INJECTION, SOLUTION INTRAVENOUS
Status: DISCONTINUED | OUTPATIENT
Start: 2022-08-27 | End: 2022-08-30 | Stop reason: HOSPADM

## 2022-08-27 RX ORDER — HYDROMORPHONE HCL IN WATER/PF 6 MG/30 ML
0.2 PATIENT CONTROLLED ANALGESIA SYRINGE INTRAVENOUS EVERY 5 MIN PRN
Status: DISCONTINUED | OUTPATIENT
Start: 2022-08-27 | End: 2022-08-27 | Stop reason: HOSPADM

## 2022-08-27 RX ORDER — ONDANSETRON 2 MG/ML
4 INJECTION INTRAMUSCULAR; INTRAVENOUS EVERY 6 HOURS PRN
Status: DISCONTINUED | OUTPATIENT
Start: 2022-08-27 | End: 2022-08-30 | Stop reason: HOSPADM

## 2022-08-27 RX ORDER — FENTANYL CITRATE 50 UG/ML
INJECTION, SOLUTION INTRAMUSCULAR; INTRAVENOUS
Status: COMPLETED | OUTPATIENT
Start: 2022-08-27 | End: 2022-08-27

## 2022-08-27 RX ORDER — LIDOCAINE 40 MG/G
CREAM TOPICAL
Status: DISCONTINUED | OUTPATIENT
Start: 2022-08-31 | End: 2022-08-30 | Stop reason: HOSPADM

## 2022-08-27 RX ORDER — CEFAZOLIN SODIUM/WATER 2 G/20 ML
2 SYRINGE (ML) INTRAVENOUS SEE ADMIN INSTRUCTIONS
Status: DISCONTINUED | OUTPATIENT
Start: 2022-08-27 | End: 2022-08-27 | Stop reason: HOSPADM

## 2022-08-27 RX ORDER — BISACODYL 10 MG
10 SUPPOSITORY, RECTAL RECTAL DAILY PRN
Status: DISCONTINUED | OUTPATIENT
Start: 2022-08-29 | End: 2022-08-28

## 2022-08-27 RX ORDER — MAGNESIUM SULFATE HEPTAHYDRATE 40 MG/ML
4 INJECTION, SOLUTION INTRAVENOUS ONCE
Status: COMPLETED | OUTPATIENT
Start: 2022-08-27 | End: 2022-08-27

## 2022-08-27 RX ORDER — CARBOPROST TROMETHAMINE 250 UG/ML
250 INJECTION, SOLUTION INTRAMUSCULAR
Status: DISCONTINUED | OUTPATIENT
Start: 2022-08-27 | End: 2022-08-27 | Stop reason: HOSPADM

## 2022-08-27 RX ORDER — LABETALOL 200 MG/1
200 TABLET, FILM COATED ORAL 3 TIMES DAILY
Status: DISCONTINUED | OUTPATIENT
Start: 2022-08-27 | End: 2022-08-27

## 2022-08-27 RX ORDER — SODIUM CHLORIDE, SODIUM LACTATE, POTASSIUM CHLORIDE, CALCIUM CHLORIDE 600; 310; 30; 20 MG/100ML; MG/100ML; MG/100ML; MG/100ML
INJECTION, SOLUTION INTRAVENOUS CONTINUOUS PRN
Status: DISCONTINUED | OUTPATIENT
Start: 2022-08-27 | End: 2022-08-27

## 2022-08-27 RX ORDER — OXYTOCIN/0.9 % SODIUM CHLORIDE 30/500 ML
100-340 PLASTIC BAG, INJECTION (ML) INTRAVENOUS CONTINUOUS PRN
Status: DISCONTINUED | OUTPATIENT
Start: 2022-08-27 | End: 2022-08-27

## 2022-08-27 RX ORDER — OXYTOCIN 10 [USP'U]/ML
10 INJECTION, SOLUTION INTRAMUSCULAR; INTRAVENOUS
Status: DISCONTINUED | OUTPATIENT
Start: 2022-08-27 | End: 2022-08-27

## 2022-08-27 RX ORDER — ONDANSETRON 2 MG/ML
4 INJECTION INTRAMUSCULAR; INTRAVENOUS EVERY 30 MIN PRN
Status: DISCONTINUED | OUTPATIENT
Start: 2022-08-27 | End: 2022-08-27 | Stop reason: HOSPADM

## 2022-08-27 RX ORDER — CITRIC ACID/SODIUM CITRATE 334-500MG
30 SOLUTION, ORAL ORAL
Status: COMPLETED | OUTPATIENT
Start: 2022-08-27 | End: 2022-08-27

## 2022-08-27 RX ORDER — ENOXAPARIN SODIUM 100 MG/ML
40 INJECTION SUBCUTANEOUS EVERY 24 HOURS
Status: DISCONTINUED | OUTPATIENT
Start: 2022-08-28 | End: 2022-08-30 | Stop reason: HOSPADM

## 2022-08-27 RX ORDER — LABETALOL HYDROCHLORIDE 5 MG/ML
20-40 INJECTION, SOLUTION INTRAVENOUS EVERY 10 MIN PRN
Status: DISCONTINUED | OUTPATIENT
Start: 2022-08-27 | End: 2022-08-30 | Stop reason: HOSPADM

## 2022-08-27 RX ORDER — ONDANSETRON 2 MG/ML
INJECTION INTRAMUSCULAR; INTRAVENOUS PRN
Status: DISCONTINUED | OUTPATIENT
Start: 2022-08-27 | End: 2022-08-27

## 2022-08-27 RX ORDER — OXYCODONE HYDROCHLORIDE 5 MG/1
5 TABLET ORAL EVERY 4 HOURS PRN
Status: CANCELLED | OUTPATIENT
Start: 2022-08-27

## 2022-08-27 RX ORDER — PROCHLORPERAZINE 25 MG
25 SUPPOSITORY, RECTAL RECTAL EVERY 12 HOURS PRN
Status: DISCONTINUED | OUTPATIENT
Start: 2022-08-27 | End: 2022-08-30 | Stop reason: HOSPADM

## 2022-08-27 RX ORDER — MAGNESIUM SULFATE HEPTAHYDRATE 40 MG/ML
4 INJECTION, SOLUTION INTRAVENOUS
Status: DISCONTINUED | OUTPATIENT
Start: 2022-08-27 | End: 2022-08-30 | Stop reason: HOSPADM

## 2022-08-27 RX ORDER — OXYTOCIN 10 [USP'U]/ML
10 INJECTION, SOLUTION INTRAMUSCULAR; INTRAVENOUS
Status: DISCONTINUED | OUTPATIENT
Start: 2022-08-27 | End: 2022-08-27 | Stop reason: HOSPADM

## 2022-08-27 RX ORDER — ACETAMINOPHEN 325 MG/1
975 TABLET ORAL ONCE
Status: COMPLETED | OUTPATIENT
Start: 2022-08-27 | End: 2022-08-27

## 2022-08-27 RX ORDER — SIMETHICONE 80 MG
80 TABLET,CHEWABLE ORAL 4 TIMES DAILY PRN
Status: DISCONTINUED | OUTPATIENT
Start: 2022-08-27 | End: 2022-08-30 | Stop reason: HOSPADM

## 2022-08-27 RX ORDER — HYDRALAZINE HYDROCHLORIDE 20 MG/ML
2.5-5 INJECTION INTRAMUSCULAR; INTRAVENOUS EVERY 10 MIN PRN
Status: DISCONTINUED | OUTPATIENT
Start: 2022-08-27 | End: 2022-08-27 | Stop reason: HOSPADM

## 2022-08-27 RX ORDER — ACETAMINOPHEN 325 MG/1
975 TABLET ORAL EVERY 6 HOURS
Status: DISCONTINUED | OUTPATIENT
Start: 2022-08-27 | End: 2022-08-30 | Stop reason: HOSPADM

## 2022-08-27 RX ADMIN — LABETALOL HYDROCHLORIDE 80 MG: 5 INJECTION, SOLUTION INTRAVENOUS at 10:57

## 2022-08-27 RX ADMIN — SODIUM CHLORIDE, POTASSIUM CHLORIDE, SODIUM LACTATE AND CALCIUM CHLORIDE: 600; 310; 30; 20 INJECTION, SOLUTION INTRAVENOUS at 09:01

## 2022-08-27 RX ADMIN — SENNOSIDES AND DOCUSATE SODIUM 2 TABLET: 8.6; 5 TABLET ORAL at 19:46

## 2022-08-27 RX ADMIN — NIFEDIPINE 60 MG: 30 TABLET, FILM COATED, EXTENDED RELEASE ORAL at 14:44

## 2022-08-27 RX ADMIN — MAGNESIUM SULFATE HEPTAHYDRATE 4 G: 40 INJECTION, SOLUTION INTRAVENOUS at 09:37

## 2022-08-27 RX ADMIN — MAGNESIUM SULFATE HEPTAHYDRATE 2 G/HR: 40 INJECTION, SOLUTION INTRAVENOUS at 10:10

## 2022-08-27 RX ADMIN — BUPIVACAINE HYDROCHLORIDE IN DEXTROSE 1.8 ML: 7.5 INJECTION, SOLUTION SUBARACHNOID at 09:14

## 2022-08-27 RX ADMIN — KETOROLAC TROMETHAMINE 30 MG: 30 INJECTION, SOLUTION INTRAMUSCULAR; INTRAVENOUS at 22:11

## 2022-08-27 RX ADMIN — HYDRALAZINE HYDROCHLORIDE 10 MG: 20 INJECTION INTRAMUSCULAR; INTRAVENOUS at 11:43

## 2022-08-27 RX ADMIN — LABETALOL HYDROCHLORIDE 200 MG: 200 TABLET, FILM COATED ORAL at 16:53

## 2022-08-27 RX ADMIN — PROCHLORPERAZINE EDISYLATE 5 MG: 5 INJECTION INTRAMUSCULAR; INTRAVENOUS at 11:12

## 2022-08-27 RX ADMIN — Medication 2 G: at 09:15

## 2022-08-27 RX ADMIN — METOCLOPRAMIDE HYDROCHLORIDE 10 MG: 5 INJECTION INTRAMUSCULAR; INTRAVENOUS at 16:20

## 2022-08-27 RX ADMIN — PHENYLEPHRINE HYDROCHLORIDE 50 MCG/MIN: 10 INJECTION INTRAVENOUS at 09:14

## 2022-08-27 RX ADMIN — SODIUM CITRATE AND CITRIC ACID MONOHYDRATE 30 ML: 500; 334 SOLUTION ORAL at 08:43

## 2022-08-27 RX ADMIN — BUPIVACAINE HYDROCHLORIDE 20 ML: 2.5 INJECTION, SOLUTION EPIDURAL; INFILTRATION; INTRACAUDAL at 10:35

## 2022-08-27 RX ADMIN — LABETALOL HYDROCHLORIDE 200 MG: 200 TABLET, FILM COATED ORAL at 19:47

## 2022-08-27 RX ADMIN — ACETAMINOPHEN 975 MG: 325 TABLET, FILM COATED ORAL at 07:22

## 2022-08-27 RX ADMIN — ONDANSETRON 4 MG: 2 INJECTION INTRAMUSCULAR; INTRAVENOUS at 09:06

## 2022-08-27 RX ADMIN — TRANEXAMIC ACID 1 G: 10 INJECTION, SOLUTION INTRAVENOUS at 09:53

## 2022-08-27 RX ADMIN — KETOROLAC TROMETHAMINE 30 MG: 30 INJECTION, SOLUTION INTRAMUSCULAR; INTRAVENOUS at 16:54

## 2022-08-27 RX ADMIN — ACETAMINOPHEN 975 MG: 325 TABLET, FILM COATED ORAL at 21:15

## 2022-08-27 RX ADMIN — ONDANSETRON 4 MG: 2 INJECTION INTRAMUSCULAR; INTRAVENOUS at 18:19

## 2022-08-27 RX ADMIN — MORPHINE SULFATE 0.15 MG: 1 INJECTION EPIDURAL; INTRATHECAL; INTRAVENOUS at 09:14

## 2022-08-27 RX ADMIN — LABETALOL HYDROCHLORIDE 20 MG: 5 INJECTION, SOLUTION INTRAVENOUS at 08:14

## 2022-08-27 RX ADMIN — FENTANYL CITRATE 15 MCG: 50 INJECTION, SOLUTION INTRAMUSCULAR; INTRAVENOUS at 09:15

## 2022-08-27 RX ADMIN — NIFEDIPINE 60 MG: 30 TABLET, FILM COATED, EXTENDED RELEASE ORAL at 22:12

## 2022-08-27 RX ADMIN — BUPIVACAINE 20 ML: 13.3 INJECTION, SUSPENSION, LIPOSOMAL INFILTRATION at 10:35

## 2022-08-27 RX ADMIN — KETOROLAC TROMETHAMINE 30 MG: 30 INJECTION, SOLUTION INTRAMUSCULAR at 10:44

## 2022-08-27 RX ADMIN — OXYTOCIN-SODIUM CHLORIDE 0.9% IV SOLN 30 UNIT/500ML 600 ML/HR: 30-0.9/5 SOLUTION at 09:32

## 2022-08-27 RX ADMIN — LABETALOL HYDROCHLORIDE 40 MG: 5 INJECTION, SOLUTION INTRAVENOUS at 08:33

## 2022-08-27 RX ADMIN — SODIUM CHLORIDE, POTASSIUM CHLORIDE, SODIUM LACTATE AND CALCIUM CHLORIDE 75 ML/HR: 600; 310; 30; 20 INJECTION, SOLUTION INTRAVENOUS at 18:53

## 2022-08-27 RX ADMIN — Medication 50 ML/HR: at 11:05

## 2022-08-27 ASSESSMENT — ACTIVITIES OF DAILY LIVING (ADL)
WALKING_OR_CLIMBING_STAIRS_DIFFICULTY: NO
ADLS_ACUITY_SCORE: 31
FALL_HISTORY_WITHIN_LAST_SIX_MONTHS: NO
ADLS_ACUITY_SCORE: 24
DOING_ERRANDS_INDEPENDENTLY_DIFFICULTY: NO
ADLS_ACUITY_SCORE: 20
ADLS_ACUITY_SCORE: 24
ADLS_ACUITY_SCORE: 24
VISION_MANAGEMENT: GLASSES AND CONTACTS
ADLS_ACUITY_SCORE: 20
ADLS_ACUITY_SCORE: 20
ADLS_ACUITY_SCORE: 24
WEAR_GLASSES_OR_BLIND: YES
TOILETING_ISSUES: NO
CONCENTRATING,_REMEMBERING_OR_MAKING_DECISIONS_DIFFICULTY: NO
ADLS_ACUITY_SCORE: 28
DIFFICULTY_EATING/SWALLOWING: NO
DRESSING/BATHING_DIFFICULTY: NO
CHANGE_IN_FUNCTIONAL_STATUS_SINCE_ONSET_OF_CURRENT_ILLNESS/INJURY: NO

## 2022-08-27 NOTE — PLAN OF CARE
Patient arrived to [facility] unit via zoom cart at 1230,with belongings, accompanied by spouse/ significant other, with infant in arms. Received report from Andreea REVELES and checked bands. Unit and room orientation started. Call light given and within arms reach; no concerns present at this time. Continue with plan of care.

## 2022-08-27 NOTE — ANESTHESIA PROCEDURE NOTES
TAP Procedure Note    Pre-Procedure   Staff -        Anesthesiologist:  Nelson Daugherty MD       Resident/Fellow: Goldberg, Leslie, MD       Performed By: resident       Location: OR       Pre-Anesthestic Checklist: patient identified, IV checked, site marked, risks and benefits discussed, informed consent, monitors and equipment checked, pre-op evaluation, at physician/surgeon's request and post-op pain management  Timeout:       Correct Patient: Yes        Correct Procedure: Yes        Correct Site: Yes        Correct Position: Yes        Correct Laterality: Yes        Site Marked: Yes  Procedure Documentation  Procedure: TAP       Diagnosis: POST-OPERATIVE PAIN CONTROL       Laterality: bilateral       Patient Position: supine       Skin prep: Chloraprep       Needle Type: short bevel       Needle Gauge: 22.        Needle Length (millimeters): 110        Ultrasound guided       1. Ultrasound was used to identify targeted nerve, plexus, vascular marker, or fascial plane and place a needle adjacent to it in real-time.       2. Ultrasound was used to visualize the spread of anesthetic in close proximity to the above referenced structure.       3. A permanent image is entered into the patient's record.    Assessment/Narrative         The placement was negative for: blood aspirated, painful injection and site bleeding       Paresthesias: No.       Bolus given via needle. no blood aspirated via catheter.        Secured via.        Insertion/Infusion Method: Single Shot       Complications: none       Injection made incrementally with aspirations every 5 mL.    Medication(s) Administered   Bupivacaine 0.25% PF (Infiltration) - Infiltration   20 mL - 8/27/2022 10:35:00 AM  Bupivacaine liposome (Exparel) 1.3% LA inj susp (Infiltration) - Infiltration   20 mL - 8/27/2022 10:35:00 AM

## 2022-08-27 NOTE — CARE PLAN
[facility] for her scheduled  at 37 weeks for chronic hypertension. Patient initially had an elevated BP and stated she had not yet taken her am meds since she was instructed to be NPO. Instructed patient to then take her medications as normal and did OK this with anesthesia. Repeat BP several minutes later was then severe range so repeated that and sustained severe range. Notified MD team and instructed to give labetelol. Two doses of labetelol given and severe range BP persisted. Dr Pinto at bedside and her plan was to initiate magnesium once baby was born in OR- anesthesia aware of plan. Patient brought to OR and had an uneventful  section with a viable female infant at 0932. QBL in . Patient receiving TAP block now and will move to PACU shortly for recovery. If patient is stable after recovery time will plan to transfer to [facility] until discharge.

## 2022-08-27 NOTE — CARE PLAN
Data: Rosemarie Johnson transferred to [id number] via zoom cart at 1230. Baby transferred via parent's arms.  Action: Receiving unit notified of transfer: Yes. Patient and family notified of room change. Report given to Cheyanne . Belongings sent to receiving unit. Accompanied by Registered Nurse. Oriented patient to surroundings. Call light within reach. ID bands double-checked with receiving RN.  Response: Patient tolerated transfer and is stable.

## 2022-08-27 NOTE — CARE PLAN
Data: Rosemarie Johnson transferred to [id number] via cart. Baby transferred via parent's arms.  Action: Receiving unit notified of transfer: Yes. Patient and family notified of room change. Report given to Cheyanne REVELES at 1200. Belongings sent to receiving unit. Accompanied by Registered Nurse. Oriented patient to surroundings. Call light within reach. ID bands double-checked with receiving RN.  Response: Patient tolerated transfer and is stable.

## 2022-08-27 NOTE — H&P
[facility] Labor and Delivery History and Physical    Rosemarie Johnson MRN# [id number]   Age: 32 year old YOB: 1990     Date of Admission:  2022    Primary care provider: No Ref-Primary, Physician           Chief Complaint:   Rosemarie Johnson is a 32 year old female who is 37w0d pregnant and being admitted for scheduled rc/s d/t HTN, elevated dopplers and h/o prior c/s high transverse.  Feeling well this AM. Did not take her AM meds prior to coming in. No HA or vision changes. +FM.  No LOF or bleeding.          Pregnancy history:     OBSTETRIC HISTORY:    OB History    Para Term  AB Living   2 1 0 1 0 1   SAB IAB Ectopic Multiple Live Births   0 0 0 0 1      # Outcome Date GA Lbr Slava/2nd Weight Sex Delivery Anes PTL Lv   2 Current            1  20 30w2d  0.86 kg (1 lb 14.3 oz) M IAB  N RUI       EDC: Estimated Date of Delivery: 22    Prenatal Labs:   Lab Results   Component Value Date    AS Negative 2022    HEPBANG Nonreactive 2022    CHPCRT Negative 2022    GCPCRT Negative 2022    HGB 12.9 2022       GBS Status:   No results found for: GBS    Active Problem List  Patient Active Problem List   Diagnosis     High-risk pregnancy, unspecified trimester     h/o Vasa previa last pregnancy     History of placenta accreta in prior pregnancy, currently pregnant     Chronic hypertension affecting pregnancy     H/O postpartum hemorrhage, currently pregnant     COVID-19 vaccine series completed     History of prior pregnancy with IUGR      History of classical  section     Encounter for triage in pregnant patient       Medication Prior to Admission  Medications Prior to Admission   Medication Sig Dispense Refill Last Dose     aspirin (ASA) 81 MG chewable tablet Take 81 mg by mouth daily        calcium carbonate 750 MG CHEW Take 2 tablets by mouth        famotidine (PEPCID) 40 MG tablet Take 1 tablet (40 mg) by  Message   Recorded as Task   Date: 07/24/2018 12:18 PM, Created By: Katlyn Canela   Task Name: 4. Patient Message   Assigned To: [facility] Call Center Team   Regarding Patient: ANTON PURCELL, Status: In Progress   Comment:    Katlyn Canela - 24 Jul 2018 12:18 PM     TASK CREATED  Patient just finished a course of Bactrim for UTI caused by e.coli. She states UTIs cause seizures in her. She completed the antibiotic on Saturday. She does not have any typical symptoms of UTI, but she did not before.    She is asking if she could have her urine rechecked in light of her severe atypical presentation. Reviewed the urine culture and reassured the patient she had just completed the most susceptible antibiotic for the bacteria she presented with, but advised I would consult Dr Hinton with the request for retest.     [phone]    please advise   PETER HINTON - 24 Jul 2018 12:22 PM     TASK EDITED  Probably needs reassurance that it is better.    OK to repeat a urine culture only.   Deb Foss - 24 Jul 2018 1:20 PM     TASK IN PROGRESS   Deb Foss - 24 Jul 2018 1:22 PM     TASK EDITED  Pt notified, order taken to AE/SM RN        Plan   1. URINE CULTURE; Status:Active; Requested for:[id number];   DESCRIPTION : URM    Signatures   Electronically signed by : Deb Foss R.N.; Jul 24 2018  1:23PM CST     mouth 2 times daily 100 tablet 1      labetalol (NORMODYNE) 200 MG tablet Take 1 tablet (200 mg) by mouth 2 times daily 60 tablet 2      NIFEdipine ER (ADALAT CC) 60 MG 24 hr tablet Take 1 tablet (60 mg) by mouth every 12 hours 90 tablet 1      pantoprazole (PROTONIX) 40 MG EC tablet Take 1 tablet (40 mg) by mouth every morning (before breakfast) 30 tablet 2      Prenatal Vit-Fe Fumarate-FA (PRENATAL MULTIVITAMIN W/IRON) 27-0.8 MG tablet Take 1 tablet by mouth daily      .        Maternal Past Medical History:     Past Medical History:   Diagnosis Date     Anemia of mother during pregnancy, delivered 3/28/2020    Formatting of this note might be different from the original. Pitx3, cytotec, hemabate, txax2 Hb 10.1 (from 13.6) Hemorrhage 1760 total      delivery delivered 3/27/2020    Last Assessment & Plan:  Formatting of this note might be different from the original. Rosemarie Johnson is a 30 year old  who was admitted at 30w1d with pregnancy complicated by chronic hypertension, vasa previa, single umbilical artery, and IUGR, 2%ile. She was admitted on 3/27 from a scheduled ultrasound for prolonged monitoring due to decelerations in the heart rate and intermitted reversed do     Chronic hypertension      Delayed or secondary postpartum hemorrhage 3/28/2020    Formatting of this note might be different from the original. 1003cc after expressed clots. cytotec 800, oxytocinx3     High-risk pregnancy, unspecified trimester 2022     PCOS (polycystic ovarian syndrome)     dx age 12     Placenta accreta 2020    Formatting of this note might be different from the original. Pathology after delivery shows focal placenta accreta.  Patient currently asymptomatic     Vasa previa 2020                       Family History:   This patient has no significant family history            Social History:   This patient has no significant social history         Review of Systems:   CONSTITUTIONAL: NEGATIVE for  fever, chills, change in weight  ENT/MOUTH: NEGATIVE for ear, mouth and throat problems  RESP: NEGATIVE for significant cough or SOB  CV: NEGATIVE for chest pain, palpitations or peripheral edema          Physical Exam:     Vitals were reviewed  Patient Vitals for the past 12 hrs:   BP Temp Temp src Resp   22 0847 (!) 170/113 -- -- --   22 0836 (!) 161/111 -- -- --   22 0826 (!) 166/115 -- -- --   22 0816 (!) 166/116 -- -- --   22 0759 (!) 155/112 -- -- --   22 0743 (!) 164/108 -- -- --   22 0650 (!) 159/109 97.9  F (36.6  C) Oral 18   Resp: CTA  CV:   Ext: DTR-  Edema 2+b/l    Cervix: not checked.     Presentation: Cephalic  Fetal Heart Rate Tracing: reactive and reassuring  Tocometer: external monitor                     Assessment:   Rosemarie Johnson is a 37w0d pregnant female admitted with .        Plan:   Admit - see IP orders  2 units pRBCs prepped  HELLP labs  IV antihypertensives per protocol  Mag ordered, will hang in OR    Tabitha Pinto MD

## 2022-08-27 NOTE — OP NOTE
[facility]  Full Operative Progress Note     Name: Rosemarie Johnson  MRN: [id number]  : 1990  Date of Surgery: 2022    Pre-op Diagnosis:     at 37w0d  Chronic hypertension  FGR w/ elevated dopplers  H/o SI preeclampsia with severe features  H/o PHTCS d/t vasa previa  H/o PTD at 30w2d d/t preE w/ SF  H/o FGR  H/o PPH d/t focal accreta  Left lateral succenturiate lobe of placenta    Post-op Diagnosis:    Same, now   Liveborn female infant       Procedure:  Repeat low-transverse  section with double layer uterine closure via Pfannenstiel skin incision      Surgeon:  Tabitha Pinto MD    Assistants:  Jeannie Gallo MD, PGY-3    Margarita Springer MD, PGY-1      Anesthesia: Spinal, TAP block    QBL:  510 ml    Urine Output:  200 mL clear urine at the end of the case    Fluids:  900 mL crystalloid    Medications: Ancef 2g, 30U Pitocin, Tranexamic acid 1000mg    Drains: Dior Catheter       Specimens:  Placenta    Complications: None apparent    Indications:   Rosemarie Johnson is a 32 year old year old  at 37w0d who was noted to have elevated dopplers on US yesterday and recommended to present today for repeat  section.  The risks, benefits, and alternatives of  section were discussed with the patient, and she agreed to proceed.     Findings:   - A single vigorous, liveborn female weight and apgars pending  - Normal appearing uterus, fallopian tubes, ovaries.    - No nuchal cord. Clear amniotic fluid.   - Minimal to no intraabdominal adhesions, moderate recto-fascial adhesions. 1x1cm area with thinned uterine wall at site of prior PHTCS. Recommend delivery by 36-37w0d for any future deliveries.    Procedure Details:   The patient was brought to the OR, where adequate spinal anesthesia was administered.  She was placed in the dorsal supine position with a slight leftward tilt. She was prepped and draped in the usual sterile fashion. A  surgical time out was performed. A pfannenstiel skin incision was made with the scalpel, and carried down to the underlying fascia with sharp and blunt dissection. The fascia was incised in the midline, and the incision was extended laterally with the Villalba scissors. The superior aspect of the fascia was grasped with the Kocher clamps and dissected off of the underlying rectus muscles with blunt and sharp dissection. Attention was then turned to the inferior aspect of the fascia, which was similarly dissected off of the underlying rectus muscles. The rectus muscles were  in the midline, and the peritoneum was entered bluntly, and the opening was extended with digital pressure and electrocautery. The bladder blade was placed. A transverse hysterotomy was made with the scalpel in the lower uterine segment, and the incision was extended with digital pressure. The infant was noted to be in the HAIDER position, and was delivered atraumatically. The shoulders delivered easily.  A loose nuchal cord was noted and reduced after delivery. The cord was doubly clamped and cut, and the infant was handed off to the awaiting NICU staff. A segment of cord was cut and held. The placenta was delivered with gentle traction on the umbilical cord and uterine massage. The uterus was exteriorized and cleared of all clots and debris. Uterine tone was noted to be firm with 30 units of pitocin given through the running IV and uterine massage.  The hysterotomy was closed with a running locked suture of 0 Vicryl.  The hysterotomy was then imbricated using an 0 Monocryl suture. The hysterotomy was noted to be hemostatic. The posterior cul-de-sac was cleared of all clots and debris. The uterus was returned to the abdomen. The pericolic gutters were cleared of all clots and debris. The hysterotomy was reexamined and a figure of 8 suture was placed at the left corner of the hysterotomy, good hemostasis was noted. The fascia and rectus muscles  were examined and areas of oozing were controlled with electrocautery. The fascia was closed with a running 0 Vicryl suture. The subcutaneous tissue was irrigated and areas of oozing were controlled with electrocautery. The subcutaneous tissue was greater than 2 cm in thickness, and was therefore closed with 3-0 Vicryl. The skin was closed with 4-0 Monocryl and covered with a sterile dressing.    All sponge, needle, and instrument counts were correct. The patient tolerated the procedure well, and was transferred to recovery in stable condition. Dr. Pinto was present and scrubbed for the entirety of the procedure.     Jeannie Gallo MD  Obstetrics & Gynecology, PGY-3  08/27/2022 4:44 PM     Staff:  I was scrubbed and present for entire case and agree w/ above note.    Tabitha Pinto MD

## 2022-08-27 NOTE — PROGRESS NOTES
S: Admission  B:  at 37.0 weeks gestation here for scheduled repeat  section. Allergies: No Known. Labs: A Positive, GBS: Negative, Rubella: Immune, HIV: Non-Reactive, Hep B: Non-Reactive. Pregnancy Related Complications: Chronic HTN, FGR EFM 8%, History of  delivery at 30.2 related to SI pre-eclampsia. History of FGR. History of PPH and focal accreta. History of vasa previa.  A: Admitted to L&D for scheduled  section. Oriented to room and call light. EFM and [place] explained and applied to abdomen with consent. Vital signs obtained; blood pressure high, but not in severe range. Took oral hypertensive this morning. Reports good fetal movement. Denies leaking of fluid and vaginal bleeding. Denies headache, vision changes and RUQ pain. Pre-op order set released. IV started. SCD's on. No clipping needed.  Anesthesia has been to bedside to see patient. Desires skin to skin in OR and plans to breastfeed.  Shun is at bedside and supportive.   R: Scheduled  at 0830.

## 2022-08-27 NOTE — ANESTHESIA PREPROCEDURE EVALUATION
Anesthesia Pre-Procedure Evaluation    Patient: Rosemarie Johnson   MRN: [id number] : 1990        Procedure : Procedure(s):  REPEAT  SECTION          Past Medical History:   Diagnosis Date     Anemia of mother during pregnancy, delivered 3/28/2020    Formatting of this note might be different from the original. Pitx3, cytotec, hemabate, txax2 Hb 10.1 (from 13.6) Hemorrhage 1760 total      delivery delivered 3/27/2020    Last Assessment & Plan:  Formatting of this note might be different from the original. Rosemarie Johnson is a 30 year old  who was admitted at 30w1d with pregnancy complicated by chronic hypertension, vasa previa, single umbilical artery, and IUGR, 2%ile. She was admitted on 3/27 from a scheduled ultrasound for prolonged monitoring due to decelerations in the heart rate and intermitted reversed do     Chronic hypertension      Delayed or secondary postpartum hemorrhage 3/28/2020    Formatting of this note might be different from the original. 1003cc after expressed clots. cytotec 800, oxytocinx3     High-risk pregnancy, unspecified trimester 2022     PCOS (polycystic ovarian syndrome)     dx age 12     Placenta accreta 2020    Formatting of this note might be different from the original. Pathology after delivery shows focal placenta accreta.  Patient currently asymptomatic     Vasa previa 2020      Past Surgical History:   Procedure Laterality Date     C ORAL SURGERY SINGLE TOOTH      removal of impacted tooth     GYN SURGERY       WISDOM TOOTH EXTRACTION        No Known Allergies   Social History     Tobacco Use     Smoking status: Never Smoker     Smokeless tobacco: Never Used   Substance Use Topics     Alcohol use: Not Currently      Wt Readings from Last 1 Encounters:   22 96.6 kg (213 lb)        Anesthesia Evaluation   Pt has had prior anesthetic. Type: Regional.    No history of anesthetic complications       ROS/MED HX  ENT/Pulmonary:  - neg pulmonary  ROS     Neurologic:  - neg neurologic ROS     Cardiovascular: Comment: Chonic hypertension  H/o PreE    (+) hypertension----- (-) murmur   METS/Exercise Tolerance: >4 METS    Hematologic:  - neg hematologic  ROS     Musculoskeletal:       GI/Hepatic:     (+) GERD,     Renal/Genitourinary:       Endo:  - neg endo ROS     Psychiatric/Substance Use:  - neg psychiatric ROS     Infectious Disease:       Malignancy:       Other:   - Fetal growth restriction (also h/o FGR)  - H/o SI preeclamspia  - H/o PHTCS  - H/o PTD at 30w2d  - H/o PPH due to focal accreta  - Placenta with left lateral succenturiate lobe, posterior   (+) , previous ,         Physical Exam    Airway        Mallampati: II   TM distance: > 3 FB   Neck ROM: full   Mouth opening: > 3 cm    Respiratory Devices and Support         Dental  no notable dental history         Cardiovascular   cardiovascular exam normal      (-) no murmur    Pulmonary   pulmonary exam normal                OUTSIDE LABS:  CBC:   Lab Results   Component Value Date    WBC 9.7 2022    WBC 10.7 08/10/2022    HGB 14.6 2022    HGB 13.1 08/10/2022    HCT 43.0 2022    HCT 39.8 08/10/2022     2022     08/10/2022     BMP:   Lab Results   Component Value Date     2022    POTASSIUM 3.6 2022    CHLORIDE 106 2022    CO2 23 2022    BUN 11 2022    CR 0.64 08/10/2022    CR 0.38 (L) 2022     (H) 2022     COAGS:   Lab Results   Component Value Date    INR 1.01 2022     POC: No results found for: BGM, HCG, HCGS  HEPATIC:   Lab Results   Component Value Date    ALBUMIN 2.7 (L) 2022    PROTTOTAL 6.5 (L) 2022    ALT 45 2022    AST 32 2022    ALKPHOS 146 2022    BILITOTAL 0.2 2022     OTHER:   Lab Results   Component Value Date    PAPA 8.9 2022       Anesthesia Plan    ASA Status:  3   NPO Status:  ELEVATED Aspiration Risk/Unknown    Anesthesia Type: Spinal.          Techniques and Equipment:       - Blood: T&C     Consents    Anesthesia Plan(s) and associated risks, benefits, and realistic alternatives discussed. Questions answered and patient/representative(s) expressed understanding.    - Discussed:     - Discussed with:  Patient, Spouse         Postoperative Care    Pain management: Multi-modal analgesia, Peripheral nerve block (Single Shot), intrathecal morphine.   PONV prophylaxis: Ondansetron (or other 5HT-3)     Comments:    Other Comments: Discussed plan for spinal anesthetic with bilateral TAP block, with GETA back-up.            Leslie Goldberg, MD

## 2022-08-27 NOTE — BRIEF OP NOTE
Brief Operative Note   Name: Rosemarie Johnson  MRN: [id number]  : 1990  Date of Surgery: 2022    Pre-operative Diagnosis:    at 37w0d  Superimposed preeclampsia with severe features  Fetal growth restriction with elevated dopplers  H/o PTD at 30w2d via PHTCS  H/o PPH due to focal accreta  Placenta with left lateral succenturiate lobe  Post-operative Diagnosis:   Same, now   Procedure(s): Repeat low transverse  section with double layer uterine closure via Pfannenstiel skin incision    Surgeon:  Tabitha Pinto MD   Assistants:  Jeannie Gallo MD, PGY-3  Margarita Sosa MD, PGY-1    Anesthesia: Spinal, TAP block  QBL: 510 mL   Urine Output: 200 mL clear urine   Fluids: 900 mL crystalloid  Medications: Ancef 2g, Pitocin 30U, Tranexamic acid 1000mg  Drains: Dior    Specimens: Placenta  Complications: None apparent.  Findings:  - A single vigorous, liveborn female weight and apgars pending  - Normal appearing uterus, fallopian tubes, ovaries.    - No nuchal cord. Clear amniotic fluid.   - Minimal to no intraabdominal adhesions, moderate recto-fascial adhesions.    Jeannie Gallo MD  Obstetrics & Gynecology, PGY-3  2022 10:24 AM

## 2022-08-27 NOTE — DISCHARGE SUMMARY
[facility] Discharge Summary    Rosemarie Johnson MRN# [id number]   Age: 32 year old YOB: 1990     Date of Admission:  2022  Date of Discharge:  2022  Admitting Physician:  Tabitha Pinto MD  Discharge Physician:  Aniya Mccormick MD             Admission Diagnoses:    at 37w0d  Chronic hypertension  FGR w/ elevated dopplers  H/o SI preeclampsia with severe features  H/o PHTCS  H/o PTD at 30w2d d/t preE w/ SF  H/o PPH d/t focal accreta  Left lateral succenturiate lobe of placenta          Discharge Diagnosis:     Same, now , delivered   Superimposed preeclampsia with severe fetaures  Acute blood loss anemia          Procedures:     Procedure(s): Repeat low transverse  section with double layer closure via Pfannenstiel skin incision  Spinal anesthesia  TAP block                Medications Prior to Admission:     Medications Prior to Admission   Medication Sig Dispense Refill Last Dose     calcium carbonate 750 MG CHEW Take 2 tablets by mouth        famotidine (PEPCID) 40 MG tablet Take 1 tablet (40 mg) by mouth 2 times daily 100 tablet 1      pantoprazole (PROTONIX) 40 MG EC tablet Take 1 tablet (40 mg) by mouth every morning (before breakfast) 30 tablet 2      Prenatal Vit-Fe Fumarate-FA (PRENATAL MULTIVITAMIN W/IRON) 27-0.8 MG tablet Take 1 tablet by mouth daily        [DISCONTINUED] aspirin (ASA) 81 MG chewable tablet Take 81 mg by mouth daily        [DISCONTINUED] labetalol (NORMODYNE) 200 MG tablet Take 1 tablet (200 mg) by mouth 2 times daily 60 tablet 2      [DISCONTINUED] NIFEdipine ER (ADALAT CC) 60 MG 24 hr tablet Take 1 tablet (60 mg) by mouth every 12 hours 90 tablet 1              Discharge Medications:        Review of your medicines      START taking      Dose / Directions   acetaminophen 325 MG tablet  Commonly known as: TYLENOL      Dose: 650 mg  Take 2 tablets (650 mg) by mouth every 6 hours as needed for mild pain Start after  Delivery.  Quantity: 100 tablet  Refills: 0     ferrous sulfate 325 (65 Fe) MG tablet  Commonly known as: FEROSUL  Used for: Anemia due to blood loss, acute      Dose: 325 mg  Take 1 tablet (325 mg) by mouth daily (with breakfast)  Quantity: 100 tablet  Refills: 0     hydrochlorothiazide 25 MG tablet  Commonly known as: HYDRODIURIL  Used for: Chronic hypertension affecting pregnancy      Dose: 25 mg  Take 1 tablet (25 mg) by mouth daily  Quantity: 10 tablet  Refills: 0     ibuprofen 600 MG tablet  Commonly known as: ADVIL/MOTRIN      Dose: 600 mg  Take 1 tablet (600 mg) by mouth every 6 hours as needed for moderate pain Start after delivery  Quantity: 60 tablet  Refills: 0     oxyCODONE 5 MG tablet  Commonly known as: ROXICODONE      Dose: 5 mg  Take 1 tablet (5 mg) by mouth every 6 hours as needed for pain  Quantity: 12 tablet  Refills: 0     senna-docusate 8.6-50 MG tablet  Commonly known as: SENOKOT-S/PERICOLACE      Dose: 1 tablet  Take 1 tablet by mouth daily Start after delivery.  Quantity: 100 tablet  Refills: 0        CONTINUE these medicines which may have CHANGED, or have new prescriptions. If we are uncertain of the size of tablets/capsules you have at home, strength may be listed as something that might have changed.      Dose / Directions   labetalol 300 MG tablet  Commonly known as: NORMODYNE  This may have changed:     medication strength    how much to take    when to take this  Used for: Chronic hypertension affecting pregnancy      Dose: 300 mg  Take 1 tablet (300 mg) by mouth every 8 hours  Quantity: 90 tablet  Refills: 0     * NIFEdipine ER 60 MG 24 hr tablet  Commonly known as: ADALAT CC  This may have changed: Another medication with the same name was added. Make sure you understand how and when to take each.  Used for: Chronic hypertension affecting pregnancy      Dose: 60 mg  Take 1 tablet (60 mg) by mouth every 12 hours  Quantity: 90 tablet  Refills: 3     * NIFEdipine ER 60 MG 24 hr  tablet  Commonly known as: ADALAT CC  This may have changed: You were already taking a medication with the same name, and this prescription was added. Make sure you understand how and when to take each.  Used for: Chronic hypertension affecting pregnancy      Dose: 60 mg  Take 1 tablet (60 mg) by mouth 2 times daily  Quantity: 90 tablet  Refills: 1         * This list has 2 medication(s) that are the same as other medications prescribed for you. Read the directions carefully, and ask your doctor or other care provider to review them with you.            CONTINUE these medicines which have NOT CHANGED      Dose / Directions   calcium carbonate 750 MG Chew      Dose: 2 tablet  Take 2 tablets by mouth  Refills: 0     famotidine 40 MG tablet  Commonly known as: PEPCID  Used for: Gastroesophageal reflux disease with esophagitis without hemorrhage      Dose: 40 mg  Take 1 tablet (40 mg) by mouth 2 times daily  Quantity: 100 tablet  Refills: 1     pantoprazole 40 MG EC tablet  Commonly known as: PROTONIX  Used for: Gastroesophageal reflux disease with esophagitis without hemorrhage      Dose: 40 mg  Take 1 tablet (40 mg) by mouth every morning (before breakfast)  Quantity: 30 tablet  Refills: 2     prenatal multivitamin w/iron 27-0.8 MG tablet      Dose: 1 tablet  Take 1 tablet by mouth daily  Refills: 0        STOP taking    aspirin 81 MG chewable tablet  Commonly known as: ASA              Where to get your medicines      These medications were sent to [facility] [place], [place] - [address]  [address] [address]    Phone: [phone]     acetaminophen 325 MG tablet    ferrous sulfate 325 (65 Fe) MG tablet    hydrochlorothiazide 25 MG tablet    ibuprofen 600 MG tablet    labetalol 300 MG tablet    NIFEdipine ER 60 MG 24 hr tablet    NIFEdipine ER 60 MG 24 hr tablet    oxyCODONE 5 MG tablet    senna-docusate 8.6-50 MG tablet               Consultations:   anesthesia           Brief Admission History:   Rosemarie Johnson is a 32 year old year old  at 37w0d who was noted to have elevated dopplers on US yesterday and recommended to present today for repeat  section.  The risks, benefits, and alternatives of  section were discussed with the patient, and she agreed to proceed.       Intraoperative course   The procedure was uncomplicated.   mL. She received tranexamic acid.  See operative report for details.     - A single vigorous, liveborn female weight and apgars pending  - Normal appearing uterus, fallopian tubes, ovaries.    - No nuchal cord. Clear amniotic fluid.   - Minimal to no intraabdominal adhesions, moderate recto-fascial adhesions. 1x1cm area with thinned uterine wall at site of prior PHTCS. Consider delivery by 37w0d for in the future.       Postpartum Course   She received magnesium for 24h postpartum. Her antihypertensives were initially increased after delivery, but then her next BP were lower and she had a near-syncopal episode (no fall). Her BP medications were then decreased to nifedipine alone. Her labs remained normal postpartum. On POD #2, she again had mild range BPs, so she was started on HCTZ and her home dose of nifedipine and labetalol. BP after this remained elevated but improved to predominantly low mild range. She was discharged on a regimen of: Nifedipine XL 60  mg BID, Labetalol 300 mg TID, and hydrochlorothiazide 25 mg daily (hydrochlorothiazide for only a 12 day total course).    She otherwise recovered as anticipated and experienced no post-operative complications.  On discharge, her pain was well controlled. Vaginal bleeding is similar to peak menstrual flow.  Voiding without difficulty.  Ambulating well and tolerating a normal diet.  No fever or significant wound drainage.  Breastfeeding well.  Infant is stable.  Bowel function resuming.  She was discharged on post-partum day #3.    She plans an IUD at 6 weeks  postpartum    Post-partum hemoglobin:   Hemoglobin   Date Value Ref Range Status   08/28/2022 9.3 (L) 11.7 - 15.7 g/dL Final             Discharge Instructions and Follow-Up:     Discharge diet: Regular   Discharge activity: No lifting greater than 20 lbs, pushing, pulling, or other strenuous activity for 6 weeks. Pelvic rest for 6 weeks including no sexual intercourse, tampons, or douching. No driving until you can slam on the brakes without pain or while on narcotic pain medications.    Discharge follow-up: Follow up with primary OB for routine postpartum visit in 6 weeks and in 1 week for a BP check   Wound care: Keep incision clean and dry           Discharge Disposition:     Discharged to home in stable condition      Natacha Ramsay MD, PGY-2  3:44 PM  [facility]    I have seen, examined, and counseled the patient on the day of discharge. I have reviewed and edited the summary.  Aniya Mccormick

## 2022-08-27 NOTE — ANESTHESIA CARE TRANSFER NOTE
Patient: Rosemarie Johnson    Procedure: Procedure(s):  REPEAT  SECTION       Diagnosis: History of classical  section [Z98.891]  Diagnosis Additional Information: No value filed.    Anesthesia Type:   Spinal     Note:    Oropharynx: oropharynx clear of all foreign objects and spontaneously breathing  Level of Consciousness: awake  Oxygen Supplementation: room air    Independent Airway: airway patency satisfactory and stable  Dentition: dentition unchanged  Vital Signs Stable: post-procedure vital signs reviewed and stable  Report to RN Given: handoff report given  Patient transferred to: PACU    Handoff Report: Identifed the Patient, Identified the Reponsible Provider, Reviewed the pertinent medical history, Discussed the surgical course, Reviewed Intra-OP anesthesia mangement and issues during anesthesia, Set expectations for post-procedure period and Allowed opportunity for questions and acknowledgement of understanding      Vitals:  Vitals Value Taken Time   BP     Temp     Pulse 108 22 1052   Resp     SpO2 96 % 22 1052   Vitals shown include unvalidated device data.    Electronically Signed By: Leslie Goldberg, MD  2022  10:53 AM

## 2022-08-27 NOTE — PLAN OF CARE
Goal Outcome Evaluation:    Plan of Care Reviewed With: patient, spouse     Overall Patient Progress: improving    Pt able to tolerate water & granola bar this afternoon, nausea improving. Pt given scheduled Nifedepine now. Will plan to follow-up with scheduled Labetalol & other PO meds if pt able to tolerate & nausea continues to improve. Magnesium continues to infuse @ 2 gm/hr. Pt c/o continued HA, denies vision changes or epigastric pain. +2 reflexes, no clonus bilaterally. Pt denies pain. Breastfeeding baby with some staff assist, pt plans to start pumping this afternoon.  here & very supportive.

## 2022-08-27 NOTE — H&P
L&D History and Physical   2022  Rosemarie Johnson  [id number]    HPI: Rosemarie Johnson is a 32 year old  at 37w0d by LMP c/w 7w4d US who presents for scheduled  due to elevated UA in clinic yesterday afternoon.    She states that she is feeling well today.  She denies headache, vision changes, chest pain, shortness of breath, fever, chills, nausea, vomiting or other systemic complaints. She denies vaginal bleeding or loss of fluid and is feeling normal fetal movement.      Her pregnancy has been complicated by:  - Chronic Hypertension, on PTA 90mg Procardia XL  - History of SI Preeclampsia, on ASA   - History of PHTCS iso Vasa Previa  - History of PTD at 30w2d  - History of FGR  - History of PPH 2/2 focal accreta  - Placenta with Left Lateral Succenturiate Lobe    OBHX:   OB History    Para Term  AB Living   2 1 0 1 0 1   SAB IAB Ectopic Multiple Live Births   0 0 0 0 1      # Outcome Date GA Lbr Slava/2nd Weight Sex Delivery Anes PTL Lv   2 Current            1  20 30w2d  0.86 kg (1 lb 14.3 oz) M IAB  N RUI       Past Medical History:   Diagnosis Date     Anemia of mother during pregnancy, delivered 3/28/2020    Formatting of this note might be different from the original. Pitx3, cytotec, hemabate, txax2 Hb 10.1 (from 13.6) Hemorrhage 1760 total      delivery delivered 3/27/2020    Last Assessment & Plan:  Formatting of this note might be different from the original. Rosemarie Johnson is a 30 year old  who was admitted at 30w1d with pregnancy complicated by chronic hypertension, vasa previa, single umbilical artery, and IUGR, 2%ile. She was admitted on 3/27 from a scheduled ultrasound for prolonged monitoring due to decelerations in the heart rate and intermitted reversed do     Chronic hypertension      Delayed or secondary postpartum hemorrhage 3/28/2020    Formatting of this note might be different from the original. 1003cc after expressed clots. cytotec  800, oxytocinx3     High-risk pregnancy, unspecified trimester 1/31/2022     PCOS (polycystic ovarian syndrome)     dx age 12     Placenta accreta 4/16/2020    Formatting of this note might be different from the original. Pathology after delivery shows focal placenta accreta.  Patient currently asymptomatic     Vasa previa 1/30/2020       Past Surgical History:   Procedure Laterality Date     C ORAL SURGERY SINGLE TOOTH      removal of impacted tooth     GYN SURGERY       WISDOM TOOTH EXTRACTION       Medications:   No current facility-administered medications on file prior to encounter.  calcium carbonate 750 MG CHEW, Take 2 tablets by mouth  Prenatal Vit-Fe Fumarate-FA (PRENATAL MULTIVITAMIN W/IRON) 27-0.8 MG tablet, Take 1 tablet by mouth daily      No Known Allergies    Family History   Problem Relation Age of Onset     Hypertension Mother      Hypertension Father      Sleep Apnea Father      Hypertension Maternal Grandmother      Arthritis Maternal Grandmother      Parkinsonism Maternal Grandfather      Heart Disease Maternal Grandfather      Leukemia Maternal Grandfather      Heart Surgery Paternal Grandfather      SocialHx:   Social History     Tobacco Use     Smoking status: Never Smoker     Smokeless tobacco: Never Used   Substance Use Topics     Alcohol use: Not Currently     Drug use: Never       ROS: 10-point ROS negative except as indicated in HPI.    Physical Exam:  Vitals:    08/27/22 0816 08/27/22 0826 08/27/22 0836 08/27/22 0847   BP: (!) 166/116 (!) 166/115 (!) 161/111 (!) 170/113   BP Location:       Patient Position:       Cuff Size:       Resp:       Temp:       TempSrc:         General: alert, oriented female, resting in bed in NAD  CV: regular rate and rhythm, well perfused  Lungs: no increased work of breathing  Abdomen: soft, gravid, non-tender  Extremities: bilateral lower extremities non-tender with trace edema    Prenatal Labs:   Lab Results   Component Value Date    AS Negative  08/27/2022    HEPBANG Nonreactive 01/31/2022    CHPCRT Negative 02/21/2022    GCPCRT Negative 02/21/2022    HGB 12.9 08/27/2022     Labs:   Results for orders placed or performed during the hospital encounter of 08/27/22 (from the past 24 hour(s))   Asymptomatic COVID-19 Virus (Coronavirus) by PCR Nose    Specimen: Nose; Swab   Result Value Ref Range    SARS CoV2 PCR Negative Negative    Narrative    Testing was performed using the [employer] Xpress SARS-CoV-2 Assay on the   Cepheid Gene-Xpert Instrument Systems. Additional information about   this Emergency Use Authorization (EUA) assay can be found via the Lab   Guide. This test should be ordered for the detection of SARS-CoV-2 in   individuals who meet SARS-CoV-2 clinical and/or epidemiological   criteria. Test performance is unknown in asymptomatic patients. This   test is for in vitro diagnostic use under the FDA EUA for   laboratories certified under CLIA to perform high complexity testing.   This test has not been FDA cleared or approved. A negative result   does not rule out the presence of PCR inhibitors in the specimen or   target RNA in concentration below the limit of detection for the   assay. The possibility of a false negative should be considered if   the patient's recent exposure or clinical presentation suggests   COVID-19. This test was validated by the [facility]. This laboratory is certified under the Clinical Laboratory Improvement Amendments of 1988 (CLIA-88) as qualified to perform high complexity laboratory testing.     ABO/Rh type and screen    Narrative    The following orders were created for panel order ABO/Rh type and screen.  Procedure                               Abnormality         Status                     ---------                               -----------         ------                     Adult Type and Screen[[id number]]                            Final result                 Please view results  for these tests on the individual orders.   CBC with platelets differential    Narrative    The following orders were created for panel order CBC with platelets differential.  Procedure                               Abnormality         Status                     ---------                               -----------         ------                     CBC with platelets and d...[[id number]]                      Final result                 Please view results for these tests on the individual orders.   Adult Type and Screen   Result Value Ref Range    ABO/RH(D) A POS     Antibody Screen Negative Negative    SPECIMEN EXPIRATION DATE [id number]    CBC with platelets and differential   Result Value Ref Range    WBC Count 9.9 4.0 - 11.0 10e3/uL    RBC Count 4.48 3.80 - 5.20 10e6/uL    Hemoglobin 12.9 11.7 - 15.7 g/dL    Hematocrit 38.2 35.0 - 47.0 %    MCV 85 78 - 100 fL    MCH 28.8 26.5 - 33.0 pg    MCHC 33.8 31.5 - 36.5 g/dL    RDW 13.1 10.0 - 15.0 %    Platelet Count 190 150 - 450 10e3/uL    % Neutrophils 65 %    % Lymphocytes 26 %    % Monocytes 7 %    % Eosinophils 1 %    % Basophils 0 %    % Immature Granulocytes 1 %    NRBCs per 100 WBC 0 <1 /100    Absolute Neutrophils 6.5 1.6 - 8.3 10e3/uL    Absolute Lymphocytes 2.6 0.8 - 5.3 10e3/uL    Absolute Monocytes 0.7 0.0 - 1.3 10e3/uL    Absolute Eosinophils 0.1 0.0 - 0.7 10e3/uL    Absolute Basophils 0.0 0.0 - 0.2 10e3/uL    Absolute Immature Granulocytes 0.1 <=0.4 10e3/uL    Absolute NRBCs 0.0 10e3/uL   Prepare red blood cells (unit)   Result Value Ref Range    Blood Component Type Red Blood Cells     Product Code C6471E24     Unit Status Ready for issue     Unit Number [id number]     CROSSMATCH Compatible     CODING SYSTEM [id number]    Prepare red blood cells (unit)   Result Value Ref Range    Blood Component Type Red Blood Cells     Product Code B7001D47     Unit Status Ready for issue     Unit Number [id number]     CROSSMATCH Compatible     CODING SYSTEM  [id number]        Assessment: 32 year old  at 37w0d by LMP c/w 7w4d US who presents for scheduled  due to elevated UA in clinic yesterday afternoon. Posterior placenta. BMI 32.9. EFW 5.5 lb.     Pregnancy also complicated by:  - Chronic Hypertension, on PTA 90mg Procardia XL  - History of SI Preeclampsia, on ASA   - History of PHTCS iso Vasa Previa  - History of PTD at 30w2d  - History of FGR  - History of PPH 2/2 focal accreta  - Placenta with Left Lateral Succenturiate Lobe    Plan:    # cHTN, Hx SI Pre-eclampsia w/ SF  # SI Pre-eclampsia w/ SF  Patient with a history of cHTN. She has history of SI preeclampsia in her previous pregnancy. Patient currently taking PTA nifedipine XL 60mg BID, labetalol 200mg TID. She presented to triage for her scheduled surgery with sustained severe range blood pressures after not taking her home medications this morning. She took her morning labetalol without improvement in her BP, then received 20mg, then 40mg IV labetalol but continued to have sustained severe range BP, at which point she was diagnosed with SI Pre-Eclampsia with SF by BP criteria and started on Mg.   - Admit for planned surgery  - Start Mg 4g loading > 2g/h x 24 h  - Serial BP monitoring   - IV Antihypertensives prn for sustained severe range blood pressures (>160/>110)  - continue PTA nifedipine XL 60mg BID, labetalol 200mg TID  - HELLP labs pending  - Daily weights, strict I&Os      # History of High Transverse C/S  - Plan for repeat CS  - CS consent form signed    # History of PPH   - T&C for 2u pRBCs    # Fetal Well Being  Cat I tracing, reassuring.     To OR this morning.    Patient seen and care plan discussed under supervision of Dr. Pinto.    Margarita Springer MD  PGY-1 OBGYN Resident  2022 8:55 AM     [facility] staff:  Appreciate note by Dr. Springer.  I have seen and examined the patient without the resident. I have reviewed, edited, and agree with the note.       Tabitha Pinto MD, FACOG  8/27/2022  12:07 PM

## 2022-08-27 NOTE — PROVIDER NOTIFICATION
08/27/22 1317   Provider Notification   Provider Name/Title JADA Springer   Method of Notification Electronic Page   Request Evaluate-Remote   Notification Reason Other   Can you clean up duplicate orders? Thanks!

## 2022-08-27 NOTE — ANESTHESIA POSTPROCEDURE EVALUATION
Patient: Rosemarie Johnson    Procedure: Procedure(s):  REPEAT  SECTION       Anesthesia Type:  Spinal    Note:  Disposition: Admission   Postop Pain Control: Uneventful            Sign Out: Well controlled pain   PONV: No   Neuro/Psych: Uneventful            Sign Out: Acceptable/Baseline neuro status   Airway/Respiratory: Uneventful            Sign Out: Acceptable/Baseline resp. status   CV/Hemodynamics: Uneventful            Sign Out: Acceptable CV status; No obvious hypovolemia; No obvious fluid overload   Other NRE: NONE   DID A NON-ROUTINE EVENT OCCUR? No           Last vitals:  Vitals:    22 0826 22 0836 22 0847   BP: (!) 166/115 (!) 161/111 (!) 170/113   Resp:      Temp:          Electronically Signed By: Nelson Daugherty MD  2022  10:55 AM

## 2022-08-27 NOTE — ANESTHESIA PROCEDURE NOTES
Intrathecal injection Procedure Note    Pre-Procedure   Staff -        Anesthesiologist:  Nelson Daugherty MD       Resident/Fellow: Goldberg, Leslie, MD       Performed By: resident       Location: OR       Pre-Anesthestic Checklist: patient identified, IV checked, risks and benefits discussed, informed consent, monitors and equipment checked, pre-op evaluation, at physician/surgeon's request and post-op pain management  Timeout:       Correct Patient: Yes        Correct Procedure: Yes        Correct Site: Yes        Correct Position: Yes   Procedure Documentation  Procedure: intrathecal injection       Patient Position: sitting       Patient Prep/Sterile Barriers: sterile gloves, mask       Skin prep: Chloraprep       Insertion Site: L3-4. (midline approach).       Needle Gauge: 25.        Needle Length (Inches): 3.5        Spinal Needle Type: Pencan       Introducer used       Introducer: 20 G       # of attempts: 1 and  # of redirects:  0    Assessment/Narrative         Paresthesias: No.       Sensory Level: T5       CSF fluid: clear.       Opening pressure was cmH2O while  Sitting.      Medication(s) Administered   0.75% Hyperbaric Bupivacaine (Intrathecal) - Intrathecal   1.8 mL - 8/27/2022 9:14:00 AM  Morphine PF 1 mg/mL (Intrathecal) - Intrathecal   0.15 mg - 8/27/2022 9:14:00 AM  Fentanyl PF (Intrathecal) - Intrathecal   15 mcg - 8/27/2022 9:15:00 AM

## 2022-08-28 LAB
ALT SERPL W P-5'-P-CCNC: 22 U/L (ref 0–50)
AST SERPL W P-5'-P-CCNC: 26 U/L (ref 0–45)
CREAT SERPL-MCNC: 0.71 MG/DL (ref 0.52–1.04)
ERYTHROCYTE [DISTWIDTH] IN BLOOD BY AUTOMATED COUNT: 13.4 % (ref 10–15)
ERYTHROCYTE [DISTWIDTH] IN BLOOD BY AUTOMATED COUNT: 13.6 % (ref 10–15)
GFR SERPL CREATININE-BSD FRML MDRD: >90 ML/MIN/1.73M2
HCT VFR BLD AUTO: 27.1 % (ref 35–47)
HCT VFR BLD AUTO: 28.3 % (ref 35–47)
HGB BLD-MCNC: 8.9 G/DL (ref 11.7–15.7)
HGB BLD-MCNC: 9.3 G/DL (ref 11.7–15.7)
MCH RBC QN AUTO: 28.7 PG (ref 26.5–33)
MCH RBC QN AUTO: 28.8 PG (ref 26.5–33)
MCHC RBC AUTO-ENTMCNC: 32.8 G/DL (ref 31.5–36.5)
MCHC RBC AUTO-ENTMCNC: 32.9 G/DL (ref 31.5–36.5)
MCV RBC AUTO: 87 FL (ref 78–100)
MCV RBC AUTO: 88 FL (ref 78–100)
PLATELET # BLD AUTO: 167 10E3/UL (ref 150–450)
PLATELET # BLD AUTO: 206 10E3/UL (ref 150–450)
RBC # BLD AUTO: 3.09 10E6/UL (ref 3.8–5.2)
RBC # BLD AUTO: 3.24 10E6/UL (ref 3.8–5.2)
WBC # BLD AUTO: 14 10E3/UL (ref 4–11)
WBC # BLD AUTO: 15.5 10E3/UL (ref 4–11)

## 2022-08-28 PROCEDURE — 258N000003 HC RX IP 258 OP 636

## 2022-08-28 PROCEDURE — 120N000002 HC R&B MED SURG/OB UMMC

## 2022-08-28 PROCEDURE — 250N000013 HC RX MED GY IP 250 OP 250 PS 637: Performed by: STUDENT IN AN ORGANIZED HEALTH CARE EDUCATION/TRAINING PROGRAM

## 2022-08-28 PROCEDURE — 36415 COLL VENOUS BLD VENIPUNCTURE: CPT | Performed by: STUDENT IN AN ORGANIZED HEALTH CARE EDUCATION/TRAINING PROGRAM

## 2022-08-28 PROCEDURE — 250N000011 HC RX IP 250 OP 636

## 2022-08-28 PROCEDURE — 82565 ASSAY OF CREATININE: CPT | Performed by: STUDENT IN AN ORGANIZED HEALTH CARE EDUCATION/TRAINING PROGRAM

## 2022-08-28 PROCEDURE — 250N000013 HC RX MED GY IP 250 OP 250 PS 637: Performed by: OBSTETRICS & GYNECOLOGY

## 2022-08-28 PROCEDURE — 250N000013 HC RX MED GY IP 250 OP 250 PS 637

## 2022-08-28 PROCEDURE — 84460 ALANINE AMINO (ALT) (SGPT): CPT | Performed by: OBSTETRICS & GYNECOLOGY

## 2022-08-28 PROCEDURE — 84450 TRANSFERASE (AST) (SGOT): CPT | Performed by: STUDENT IN AN ORGANIZED HEALTH CARE EDUCATION/TRAINING PROGRAM

## 2022-08-28 PROCEDURE — 85027 COMPLETE CBC AUTOMATED: CPT | Performed by: STUDENT IN AN ORGANIZED HEALTH CARE EDUCATION/TRAINING PROGRAM

## 2022-08-28 RX ORDER — BISACODYL 10 MG
10 SUPPOSITORY, RECTAL RECTAL DAILY PRN
Status: DISCONTINUED | OUTPATIENT
Start: 2022-08-28 | End: 2022-08-30 | Stop reason: HOSPADM

## 2022-08-28 RX ORDER — POLYETHYLENE GLYCOL 3350 17 G/17G
17 POWDER, FOR SOLUTION ORAL DAILY
Status: DISCONTINUED | OUTPATIENT
Start: 2022-08-28 | End: 2022-08-30 | Stop reason: HOSPADM

## 2022-08-28 RX ORDER — NIFEDIPINE 30 MG/1
60 TABLET, EXTENDED RELEASE ORAL EVERY 12 HOURS
Status: DISCONTINUED | OUTPATIENT
Start: 2022-08-28 | End: 2022-08-30 | Stop reason: HOSPADM

## 2022-08-28 RX ADMIN — ACETAMINOPHEN 975 MG: 325 TABLET, FILM COATED ORAL at 18:09

## 2022-08-28 RX ADMIN — POLYETHYLENE GLYCOL 3350 17 G: 17 POWDER, FOR SOLUTION ORAL at 18:09

## 2022-08-28 RX ADMIN — MAGNESIUM SULFATE HEPTAHYDRATE 2 G/HR: 40 INJECTION, SOLUTION INTRAVENOUS at 05:18

## 2022-08-28 RX ADMIN — ENOXAPARIN SODIUM 40 MG: 40 INJECTION SUBCUTANEOUS at 07:47

## 2022-08-28 RX ADMIN — SIMETHICONE 80 MG: 80 TABLET, CHEWABLE ORAL at 17:22

## 2022-08-28 RX ADMIN — IBUPROFEN 800 MG: 800 TABLET, FILM COATED ORAL at 12:09

## 2022-08-28 RX ADMIN — SIMETHICONE 80 MG: 80 TABLET, CHEWABLE ORAL at 01:56

## 2022-08-28 RX ADMIN — NIFEDIPINE 60 MG: 30 TABLET, FILM COATED, EXTENDED RELEASE ORAL at 09:10

## 2022-08-28 RX ADMIN — SODIUM CHLORIDE, POTASSIUM CHLORIDE, SODIUM LACTATE AND CALCIUM CHLORIDE 75 ML/HR: 600; 310; 30; 20 INJECTION, SOLUTION INTRAVENOUS at 07:37

## 2022-08-28 RX ADMIN — IBUPROFEN 800 MG: 800 TABLET, FILM COATED ORAL at 18:09

## 2022-08-28 RX ADMIN — ACETAMINOPHEN 975 MG: 325 TABLET, FILM COATED ORAL at 06:17

## 2022-08-28 RX ADMIN — NIFEDIPINE 60 MG: 30 TABLET, FILM COATED, EXTENDED RELEASE ORAL at 20:44

## 2022-08-28 RX ADMIN — SIMETHICONE 80 MG: 80 TABLET, CHEWABLE ORAL at 07:48

## 2022-08-28 RX ADMIN — SENNOSIDES AND DOCUSATE SODIUM 1 TABLET: 50; 8.6 TABLET ORAL at 07:48

## 2022-08-28 RX ADMIN — BISACODYL 10 MG: 10 SUPPOSITORY RECTAL at 21:29

## 2022-08-28 RX ADMIN — LABETALOL HYDROCHLORIDE 300 MG: 200 TABLET, FILM COATED ORAL at 07:48

## 2022-08-28 RX ADMIN — KETOROLAC TROMETHAMINE 30 MG: 30 INJECTION, SOLUTION INTRAMUSCULAR; INTRAVENOUS at 06:17

## 2022-08-28 RX ADMIN — SENNOSIDES AND DOCUSATE SODIUM 2 TABLET: 8.6; 5 TABLET ORAL at 20:23

## 2022-08-28 RX ADMIN — ACETAMINOPHEN 975 MG: 325 TABLET, FILM COATED ORAL at 12:09

## 2022-08-28 ASSESSMENT — ACTIVITIES OF DAILY LIVING (ADL)
ADLS_ACUITY_SCORE: 20
ADLS_ACUITY_SCORE: 24
ADLS_ACUITY_SCORE: 20
ADLS_ACUITY_SCORE: 24
ADLS_ACUITY_SCORE: 20
ADLS_ACUITY_SCORE: 20
ADLS_ACUITY_SCORE: 24
ADLS_ACUITY_SCORE: 24

## 2022-08-28 NOTE — PROGRESS NOTES
Post Partum Progress Note  PPD#1    Subjective:  She is resting comfortably in bed this morning. She has no complaints. She has minimally pain but has so far ambulated just once, this morning around her room. Pain is well controlled on current medication regimen. She is tolerating PO intake. Lochia present and similar to menses.  Dior in place. She has not yet passed flatus. She denies dizziness or difficulty.  She denies headache, changes in vision, nausea/vomiting, chest pain, shortness of breath, RUQ pain, or worsening edema.  Plans to breastfeed, which is going well.    Objective:  Vitals:    22 2100 22 0149 22 0520 22 0612   BP: (!) 132/95 (!) 131/95 (!) 138/100    BP Location: Left arm Left arm Right arm    Patient Position:  Semi-Durand's Fowlers    Cuff Size:  Adult Regular Adult Regular    Pulse: 73 70 70    Resp: 16 16 16    Temp: 97.5  F (36.4  C) 97.6  F (36.4  C) 97.6  F (36.4  C)    TempSrc: Oral Oral Oral    SpO2: 99% 99% 100%    Weight:    95.9 kg (211 lb 6.4 oz)       General: NAD. A&Ox3.  CV: Regular rate, well perfused.   Pulm: Normal respiratory effort.  Abd: Soft, non-tender, non-distended. Fundus is firm and below the umbilicus.    Incision: covered in sterile bandage, no shadowing.  Ext: 2+ lower extremity edema bilaterally. No calf tenderness. Reflexes 2+ b/l patellars    Assessment/Plan:  Rosemarie Johnson is a 32 year old  female who is POD#1 s/p RLTCS. Met criteria for superimposed preeclampsia with severe features in the postpartum period.    Superimposed PreE w/ SF  - IV labet 20/40/80, IV hydral 10 ( 1143)  - IV Mg 4g (0937)> 2g/h. Plan to discontinue at 24h postpartum  - HELLP nl (), repeat pending this AM  - BP normal to high mild range, currently on PTA nifed 60 BID, labet 200mg TID> increased to 300mg TID this AM    Routine PP care  - Encourage routine post-operative goals including ambulation and incentive spirometry  - PNC: Rh pos. Rubella  immune. No intervention indicated.  - Pain: controlled on oral medications  - Heme: Hgb 12.9>  (TXA)*>AM Hgb pending.  If <10 will discharge home with iron.  - GI: continue anti-emetics and stool softeners as needed.  - : Dior in place.  - Infant: Stable in room  - Feeding: Plans on breastfeeding.  - BC: not discussed    Discharge to home on POD#2-3    Gabbi Dumont MD  ObGyn Resident, PGY-3  7:29 AM 08/28/2022     I have seen and examined the patient without the resident. I have reviewed, edited, and agree with the note.   My findings are:  Appears well.   Abdomen: soft, NT, ND.   Incision CDI   LE without significant edema or erythema bilaterally  Hemoglobin   Date Value Ref Range Status   08/28/2022 8.9 (L) 11.7 - 15.7 g/dL Final   08/27/2022 12.9 11.7 - 15.7 g/dL Final     ABLA: stable and asymptomatic.       Aniya Mccormick MD

## 2022-08-28 NOTE — PLAN OF CARE
Pt is on Magnesium running at 50 ml/hr with LR at 75 ml/hr via left PIV. VSS on RA, BP remains on mild-mod range. Denies symptoms. DTR +2, 1 beat of clonus on right foot, 0 on left. Ambulatory with SBA, no dizziness. Fundus firm at 1 cm below umbilicus. Lochia scant. Dior in place, adequate I&O. Not passing flatus but burping, Simethicone given. Able to tolerate PO intake. Incisional  pain managed with Tylenol and Toradol. Wound dressing c/d/i. Independent with breast feeding.  is supportive with cares. Continue plan of care.     Vitals:    08/27/22 2100 08/28/22 0149 08/28/22 0520 08/28/22 0612   BP: (!) 132/95 (!) 131/95 (!) 138/100    BP Location: Left arm Left arm Right arm    Patient Position:  Semi-Durand's Fowlers    Cuff Size:  Adult Regular Adult Regular    Pulse: 73 70 70    Resp: 16 16 16    Temp: 97.5  F (36.4  C) 97.6  F (36.4  C) 97.6  F (36.4  C)    TempSrc: Oral Oral Oral    SpO2: 99% 99% 100%    Weight:    95.9 kg (211 lb 6.4 oz)

## 2022-08-28 NOTE — PROVIDER NOTIFICATION
08/28/22 1030   Provider Notification   Provider Name/Title Dr Gallo   Method of Notification Electronic Page   Request Evaluate-Remote   Notification Reason Status Update   FYI: 0945 pt had syncopal episode on toilet (no falls occurred). 0748 /101 given daily labatol. 0915 /96 given daily procardia. 1000 /72    Dr. Gallo asked that BP be monitored Q30 for 2 hours.

## 2022-08-28 NOTE — PLAN OF CARE
Data: Vital signs within normal limits. Postpartum checks within normal limits - see flow record. Patient eating and drinking normally. Patient has hartman, is patent and draining adequate amounts of urine. Has stood at EOB, was unable to ambulate due to dizziness. No apparent signs of infection. Incision healing well. Mag infusing at 2g/hr. Reflexes are +2/no clonus. Mild HA, no other pre- e sx.   Action: Patient medicated during the shift for pain and cramping. See MAR. Patient reassessed within 1 hour after each medication and pain was improved - patient stated she was comfortable. Patient education done about pain control, pre-e sx, pumping, incision care. See flow record.  Response: Positive attachment behaviors observed with infant. Support persons are present.   Plan: Anticipate discharge on POD 2-3.Goal Outcome Evaluation:    Plan of Care Reviewed With: patient, spouse

## 2022-08-28 NOTE — PLAN OF CARE
Goal Outcome Evaluation:    Plan of Care Reviewed With: patient, spouse     Overall Patient Progress: improving    Data: Blood pressures were high earlier in the day, but have been WNL since 940am and she denies headache, vision changes, SOB, RUQ pain. Reflexes +2, no clonus. Other VSS.  Patient had syncope episode at 940 on toilet (no actual fall occurred). Most likely related to BP lowering. Spoke to provider about holding labatolol. Dr. Gallo decided to hold 2pm labatolol dose. She would like q2h BP until 8pm and will evaluate future doses of BP medicines.  Postpartum assessments WNL. She is voiding without difficulty, up ad annette, passing gas, eating and drinking normally. Incision still covered and dressing intact, lochia WNL, no s/s infection. Breastfeeding infant and pumping periodically. Taking Tylenol and Ibuprofen for pain and using abdominal binder. Reports good pain management.   Action: Education provided on breastfeeding,  screenings, and plan of care.  Response: Pt is agreeable with her plan of care. Positive attachment behaviors observed with infant. Support person,  Shun, present. Anticipate discharge per care plan.

## 2022-08-28 NOTE — PROVIDER NOTIFICATION
08/28/22 0825   Provider Notification   Provider Name/Title Dr. Gallo   Method of Notification Electronic Page   Request Evaluate-Remote   Notification Reason Status Update     FYI patients most recent BP is 147/101, given AM dose of labatolol. Wants to wait 60 min to take procardia.

## 2022-08-29 ENCOUNTER — TELEPHONE (OUTPATIENT)
Dept: OBGYN | Facility: CLINIC | Age: 32
End: 2022-08-29

## 2022-08-29 PROCEDURE — 120N000002 HC R&B MED SURG/OB UMMC

## 2022-08-29 PROCEDURE — 250N000013 HC RX MED GY IP 250 OP 250 PS 637

## 2022-08-29 PROCEDURE — 250N000013 HC RX MED GY IP 250 OP 250 PS 637: Performed by: OBSTETRICS & GYNECOLOGY

## 2022-08-29 PROCEDURE — 250N000011 HC RX IP 250 OP 636

## 2022-08-29 PROCEDURE — 250N000013 HC RX MED GY IP 250 OP 250 PS 637: Performed by: STUDENT IN AN ORGANIZED HEALTH CARE EDUCATION/TRAINING PROGRAM

## 2022-08-29 RX ORDER — HYDROCHLOROTHIAZIDE 25 MG/1
25 TABLET ORAL DAILY
Qty: 10 TABLET | Refills: 0 | Status: SHIPPED | OUTPATIENT
Start: 2022-08-29 | End: 2022-10-10

## 2022-08-29 RX ORDER — IBUPROFEN 600 MG/1
600 TABLET, FILM COATED ORAL EVERY 6 HOURS PRN
Qty: 60 TABLET | Refills: 0 | Status: SHIPPED | OUTPATIENT
Start: 2022-08-29 | End: 2022-10-10

## 2022-08-29 RX ORDER — OXYCODONE HYDROCHLORIDE 5 MG/1
5 TABLET ORAL EVERY 6 HOURS PRN
Qty: 12 TABLET | Refills: 0 | Status: SHIPPED | OUTPATIENT
Start: 2022-08-29 | End: 2022-09-01

## 2022-08-29 RX ORDER — ACETAMINOPHEN 325 MG/1
650 TABLET ORAL EVERY 6 HOURS PRN
Qty: 100 TABLET | Refills: 0 | Status: SHIPPED | OUTPATIENT
Start: 2022-08-29 | End: 2022-11-18

## 2022-08-29 RX ORDER — HYDROCHLOROTHIAZIDE 25 MG/1
25 TABLET ORAL DAILY
Status: DISCONTINUED | OUTPATIENT
Start: 2022-08-29 | End: 2022-08-30 | Stop reason: HOSPADM

## 2022-08-29 RX ORDER — LABETALOL 200 MG/1
200 TABLET, FILM COATED ORAL 3 TIMES DAILY
Status: DISCONTINUED | OUTPATIENT
Start: 2022-08-29 | End: 2022-08-30

## 2022-08-29 RX ORDER — AMOXICILLIN 250 MG
1 CAPSULE ORAL DAILY
Qty: 100 TABLET | Refills: 0 | Status: SHIPPED | OUTPATIENT
Start: 2022-08-29 | End: 2022-10-10

## 2022-08-29 RX ORDER — FERROUS SULFATE 325(65) MG
325 TABLET ORAL
Qty: 100 TABLET | Refills: 0 | Status: SHIPPED | OUTPATIENT
Start: 2022-08-29 | End: 2022-10-10

## 2022-08-29 RX ADMIN — SENNOSIDES AND DOCUSATE SODIUM 2 TABLET: 8.6; 5 TABLET ORAL at 08:06

## 2022-08-29 RX ADMIN — ACETAMINOPHEN 975 MG: 325 TABLET, FILM COATED ORAL at 18:20

## 2022-08-29 RX ADMIN — IBUPROFEN 800 MG: 800 TABLET, FILM COATED ORAL at 18:20

## 2022-08-29 RX ADMIN — SENNOSIDES AND DOCUSATE SODIUM 2 TABLET: 8.6; 5 TABLET ORAL at 20:06

## 2022-08-29 RX ADMIN — ACETAMINOPHEN 975 MG: 325 TABLET, FILM COATED ORAL at 00:50

## 2022-08-29 RX ADMIN — NIFEDIPINE 60 MG: 30 TABLET, FILM COATED, EXTENDED RELEASE ORAL at 08:07

## 2022-08-29 RX ADMIN — IBUPROFEN 800 MG: 800 TABLET, FILM COATED ORAL at 06:35

## 2022-08-29 RX ADMIN — IBUPROFEN 800 MG: 800 TABLET, FILM COATED ORAL at 12:33

## 2022-08-29 RX ADMIN — ENOXAPARIN SODIUM 40 MG: 40 INJECTION SUBCUTANEOUS at 08:07

## 2022-08-29 RX ADMIN — POLYETHYLENE GLYCOL 3350 17 G: 17 POWDER, FOR SOLUTION ORAL at 08:07

## 2022-08-29 RX ADMIN — SIMETHICONE 80 MG: 80 TABLET, CHEWABLE ORAL at 00:50

## 2022-08-29 RX ADMIN — NIFEDIPINE 60 MG: 30 TABLET, FILM COATED, EXTENDED RELEASE ORAL at 21:01

## 2022-08-29 RX ADMIN — IBUPROFEN 800 MG: 800 TABLET, FILM COATED ORAL at 00:50

## 2022-08-29 RX ADMIN — SIMETHICONE 80 MG: 80 TABLET, CHEWABLE ORAL at 12:36

## 2022-08-29 RX ADMIN — LABETALOL HYDROCHLORIDE 200 MG: 200 TABLET, FILM COATED ORAL at 19:02

## 2022-08-29 RX ADMIN — SIMETHICONE 80 MG: 80 TABLET, CHEWABLE ORAL at 20:09

## 2022-08-29 RX ADMIN — ACETAMINOPHEN 975 MG: 325 TABLET, FILM COATED ORAL at 06:35

## 2022-08-29 RX ADMIN — HYDROCHLOROTHIAZIDE 25 MG: 25 TABLET ORAL at 11:29

## 2022-08-29 RX ADMIN — ACETAMINOPHEN 975 MG: 325 TABLET, FILM COATED ORAL at 12:33

## 2022-08-29 ASSESSMENT — ACTIVITIES OF DAILY LIVING (ADL)
ADLS_ACUITY_SCORE: 20

## 2022-08-29 NOTE — TELEPHONE ENCOUNTER
Forms received in person from Shun Johnson for [employer] disability forms. Placed in Dr. Pinto's basket as patient's  was performed by Dr. Pinto.    Alisa Franklin  Clinical Services Assistant

## 2022-08-29 NOTE — PROGRESS NOTES
Post Partum Progress Note    Subjective:  She is resting comfortably in bed this morning. She has no complaints. Pain is well controlled on current medication regimen. Ambulating.  She is tolerating PO intake. Lochia present and similar to menses. Voiding spontaneously. She has passed flatus, hoping to have a BM this AM. She denies dizziness or difficulty.  She denies headache, changes in vision, nausea/vomiting, chest pain, shortness of breath, RUQ pain, or worsening edema.  Plans to breastfeed, which is going well.    Objective:  Vitals:    22 2028 22 2300 22 0028 22 0600   BP: (!) 135/90 (!) 143/95 136/89 (!) 136/96   BP Location: Right arm Left arm Left arm Right arm   Patient Position:  Chair Fowlers Chair   Cuff Size:   Adult Regular Adult Regular   Pulse: 85 95 89 95   Resp: 16 16 16 17   Temp:   98.3  F (36.8  C) 98  F (36.7  C)   TempSrc: Oral  Oral Oral   SpO2:       Weight:    92.2 kg (203 lb 4.8 oz)     General: NAD. A&Ox3. Ambulating around the room  CV: Regular rate, well perfused.   Pulm: Normal respiratory effort.  Abd: Soft, non-tender, non-distended. Fundus is firm and below the umbilicus.    Incision: covered in sterile bandage, no shadowing. She plans to remove the dressing in the shower today  Ext: 1+ lower extremity edema bilaterally.     I/O last 3 completed shifts:  In: 4433.75 [P.O.:3200; I.V.:1233.75]  Out: 6500 [Urine:6500]    Vitals:    22 0612 22 0600   Weight: 95.9 kg (211 lb 6.4 oz) 92.2 kg (203 lb 4.8 oz)       Assessment/Plan:  Rosemarie Johnson is a 32 year old  female who is POD#2 s/p RLTCS. Met criteria for SI preeclampsia with severe features in the postpartum period.    Superimposed PreE w/ SF  - IV labet 20/40/80, IV hydral 10 ( 1143)  - s/p magnesium for 24h PP  - HELLP labs nl   - BP normal to low mild range, currently on PTA nifed 60 BID,   - PTA labet 200mg TID was increased to 300mg TID for one dose yesterday AM, but then  stopped due to syncope. BP have been well controlled off this, so will continue to hold  - she has a BP cuff at home    Routine PP care  - Encourage routine post-operative goals including ambulation and incentive spirometry  - PNC: Rh pos. Rubella immune. No intervention indicated.  - Pain: controlled on oral medications  - Heme: Hgb 12.9>  (TXA)> 8.9>9.3  Asymptomatic from acute blood loss anemia. will discharge home with iron.  - GI: continue anti-emetics and stool softeners as needed.  - : voiding spontaneously  - Infant: Stable in room  - Feeding: Plans on breastfeeding.  - BC: plans IUD at 6w PP    Discharge to home on POD#2-3, pending BP control    Pete Mobley MD  OB/GYN PGY-3  08/29/2022 6:45 AM    BP (!) 136/96 (BP Location: Right arm, Patient Position: Chair, Cuff Size: Adult Regular)   Pulse 95   Temp 98  F (36.7  C) (Oral)   Resp 17   Wt 92.2 kg (203 lb 4.8 oz)   LMP 12/11/2021   SpO2 99%   Breastfeeding Unknown   BMI 31.35 kg/m    Hemoglobin   Date Value Ref Range Status   08/28/2022 9.3 (L) 11.7 - 15.7 g/dL Final     The patient was seen and examined by me separately from the team.  I have reviewed and agree with the above note.  She is feeling well this morning, baby is doing great-would like to go home later today if possible.  Discussed adding hydrochlorothiazide 25 mg and watching BP though the day today.  If stable, could consider pm discharge given she has a cuff at home and is comfortable checking her blood pressure.  She is asymptomatic from her ABLA.  Plan to discharge with PO iron given <10.      Keyanna Alvarez MD, FACOG

## 2022-08-29 NOTE — PROVIDER NOTIFICATION
08/29/22 1658   Provider Notification   Provider Name/Title Dr. Alvarez   Method of Notification Electronic Page   Request Evaluate-Remote   Notification Reason Vital Signs Change   Bp in the critical range 145/110. Will recheck in 15 mins

## 2022-08-29 NOTE — PLAN OF CARE
Goal Outcome Evaluation:    Plan of Care Reviewed With: patient, spouse     Overall Patient Progress: improving       Data:  Blood pressures have been higher upper 1402-150/ 103-110, medication regime was adjusted.  She denies headache, vision changes, SOB, RUQ pain. Reflexes +2, no clonus.    VSS, postpartum assessments WNL. She is voiding without difficulty, up ad annette, passing gas, eating and drinking normally. Incision appears to be healing well, lochia WNL, no s/s infection. Breastfeeding infant , pumping with minimal assistance. Taking ibuprofen and tylenol for pain and using abdominal binder. Reports good pain management.   Action: Education provided on plan of care, unlatching baby, checking the latch, feel of a good latch.  Response: Pt is agreeable with her plan of care. Positive attachment behaviors observed with infant. Support person,  Shun, present. Anticipate discharge per care plan.

## 2022-08-29 NOTE — PROVIDER NOTIFICATION
08/29/22 1834   Provider Notification   Provider Name/Title G2 Devendra   Method of Notification Electronic Page   Request Evaluate-Remote   Notification Reason Medication Request   pt BPs have been in the upper 140s-150/103-110 since 10 am today.  Does med plan need to be modified?

## 2022-08-29 NOTE — LACTATION NOTE
"Consult for first time breastfeeding, SGA baby    Delivery Information: C/S delivery on 22 at 0932, at 37 weeks 0 days.    Maternal Health History:?32 y.o., , with chronic hypertension. Her first baby was born at 30 weeks gestation and she exclusively pumped for 5 months.     Maternal Breast Exam:?Exam not done. Mom denies any nipple tenderness and feels that baby is latching well. She reports breast changes during her pregnancy.     Infant information: Baby girl Paulina was SGA at 4 lbs 12 oz. Mom feels that she is breastfeeding well, on cue and at least every 3 hours, followed by supplementation with a combination of expressed and donor breast milk, up to 22 mL per feeding. This has been given via finger feeding, but the most recent supplement was given via a bottle with a slow-flow nipple.     Oral exam of baby: Mom opted out of an oral exam, due to no concerns with latch.     Feeding Assessment: Baby had just finished feeding when this consult began. According to mom, she is feeding for 12 minutes on each side, and then is being supplemented by bottle with a slow-flow nipple after, by dad, while mom pumps. Baby had a \"bigger\" spit up after this last feeding, according to her parents.     Education: signs breastfeeding is going well (comfortable latch, age appropriate output and weight loss, swallowing heard at the breast), satiety cues, expected  output,  weight loss, nutritive vs non-nutritive sucking, benefits of skin to skin, benefits of breast massage and hand expression of colostrum, Infant Feeding Log handout and when and who to call if concerns, [facility] pump cleaning handout, inpatient lactation support and outpatient lactation resources.     Feeding Plan:  Frequent skin to skin, breastfeed on cue at least every 3 hours since early term (8-12 times/day), encourage breast compressions to enhance milk transfer & offer mom's expressed milk after. Encourage continued hand expressing after " each feeding until milk is in, hands on pumping at least 4 times daily to maximize milk supply. Follow up with outpatient lactation consultant within a week of discharge for support with early term infant, check in on milk transfer and supply, and guidance on weaning from pumping after supply established.

## 2022-08-29 NOTE — PLAN OF CARE
Data: Vital signs within normal limits. Postpartum checks within normal limits - see flow record. Patient eating and drinking normally. Patient able to empty bladder independently and is up ambulating. No apparent signs of infection. Incision healing well. Patient performing self cares and is able to care for infant.  Action: Patient medicated during the shift for pain and cramping. See MAR. Patient reassessed within 1 hour after each medication and pain was improved - patient stated she was comfortable.Tried a suppository for gas pains, along with miralax. Hot packs placed on abdomen. Patient education done about pain control, gas pains/pressure, blood pressure checks. See flow record.  Response: Positive attachment behaviors observed with infant. Support persons are present.   Plan: Anticipate discharge on Mon/Tues.Goal Outcome Evaluation:    Plan of Care Reviewed With: patient, spouse

## 2022-08-29 NOTE — PLAN OF CARE
Pt is post Mag for nearly 24 hrs now. VSS on RA, BP remains on mild-mod range. Denies symptoms. DTR +2, no clonus. Ambulatory without dizziness. Fundus firm at 1 cm below umbilicus. Lochia scant. Adequate I&O. Simethicone given for gas pain. No BM, passing flatus, denies N/V. Incisional  pain managed with Tylenol and Ibuprofen. Wound dressing c/d/i. Independent with breast feeding.  is supportive with cares. Continue plan of care.    Vitals:    08/28/22 2028 08/28/22 2300 08/29/22 0028 08/29/22 0600   BP: (!) 135/90 (!) 143/95 136/89 (!) 136/96   BP Location: Right arm Left arm Left arm Right arm   Patient Position:  Chair Fowlers Chair   Cuff Size:   Adult Regular Adult Regular   Pulse: 85 95 89 95   Resp: 16 16 16 17   Temp:   98.3  F (36.8  C) 98  F (36.7  C)   TempSrc: Oral  Oral Oral   SpO2:       Weight:    92.2 kg (203 lb 4.8 oz)   Add: pt had a small BM after ambulating down the hallway, will shower this morning, will take Ibuprofen and Tylenol.

## 2022-08-29 NOTE — PROVIDER NOTIFICATION
08/29/22 1156   Provider Notification   Provider Name/Title G2 Dr Ramsay   Method of Notification Electronic Page   Request Evaluate-Remote   Notification Reason Vital Signs Change   BP trending up. 150/104

## 2022-08-30 VITALS
SYSTOLIC BLOOD PRESSURE: 152 MMHG | WEIGHT: 201.3 LBS | BODY MASS INDEX: 31.04 KG/M2 | DIASTOLIC BLOOD PRESSURE: 115 MMHG | TEMPERATURE: 98.4 F | HEART RATE: 100 BPM | RESPIRATION RATE: 17 BRPM | OXYGEN SATURATION: 99 %

## 2022-08-30 LAB — PLATELET # BLD AUTO: 197 10E3/UL (ref 150–450)

## 2022-08-30 PROCEDURE — 250N000013 HC RX MED GY IP 250 OP 250 PS 637: Performed by: STUDENT IN AN ORGANIZED HEALTH CARE EDUCATION/TRAINING PROGRAM

## 2022-08-30 PROCEDURE — 250N000013 HC RX MED GY IP 250 OP 250 PS 637

## 2022-08-30 PROCEDURE — 36415 COLL VENOUS BLD VENIPUNCTURE: CPT

## 2022-08-30 PROCEDURE — 85049 AUTOMATED PLATELET COUNT: CPT

## 2022-08-30 PROCEDURE — 250N000011 HC RX IP 250 OP 636

## 2022-08-30 PROCEDURE — 250N000013 HC RX MED GY IP 250 OP 250 PS 637: Performed by: OBSTETRICS & GYNECOLOGY

## 2022-08-30 RX ORDER — LABETALOL 300 MG/1
300 TABLET, FILM COATED ORAL EVERY 8 HOURS
Qty: 90 TABLET | Refills: 0 | Status: SHIPPED | OUTPATIENT
Start: 2022-08-30 | End: 2022-10-10

## 2022-08-30 RX ORDER — LABETALOL 100 MG/1
100 TABLET, FILM COATED ORAL ONCE
Status: COMPLETED | OUTPATIENT
Start: 2022-08-30 | End: 2022-08-30

## 2022-08-30 RX ADMIN — ACETAMINOPHEN 975 MG: 325 TABLET, FILM COATED ORAL at 13:01

## 2022-08-30 RX ADMIN — ENOXAPARIN SODIUM 40 MG: 40 INJECTION SUBCUTANEOUS at 08:46

## 2022-08-30 RX ADMIN — NIFEDIPINE 60 MG: 30 TABLET, FILM COATED, EXTENDED RELEASE ORAL at 08:46

## 2022-08-30 RX ADMIN — SENNOSIDES AND DOCUSATE SODIUM 2 TABLET: 8.6; 5 TABLET ORAL at 08:47

## 2022-08-30 RX ADMIN — ACETAMINOPHEN 975 MG: 325 TABLET, FILM COATED ORAL at 00:18

## 2022-08-30 RX ADMIN — POLYETHYLENE GLYCOL 3350 17 G: 17 POWDER, FOR SOLUTION ORAL at 08:46

## 2022-08-30 RX ADMIN — IBUPROFEN 800 MG: 800 TABLET, FILM COATED ORAL at 00:18

## 2022-08-30 RX ADMIN — LABETALOL HYDROCHLORIDE 100 MG: 100 TABLET, FILM COATED ORAL at 07:01

## 2022-08-30 RX ADMIN — IBUPROFEN 800 MG: 800 TABLET, FILM COATED ORAL at 06:25

## 2022-08-30 RX ADMIN — LABETALOL HYDROCHLORIDE 300 MG: 200 TABLET, FILM COATED ORAL at 14:12

## 2022-08-30 RX ADMIN — IBUPROFEN 800 MG: 800 TABLET, FILM COATED ORAL at 13:02

## 2022-08-30 RX ADMIN — LABETALOL HYDROCHLORIDE 200 MG: 200 TABLET, FILM COATED ORAL at 06:44

## 2022-08-30 RX ADMIN — ACETAMINOPHEN 975 MG: 325 TABLET, FILM COATED ORAL at 06:25

## 2022-08-30 RX ADMIN — HYDROCHLOROTHIAZIDE 25 MG: 25 TABLET ORAL at 06:44

## 2022-08-30 ASSESSMENT — ACTIVITIES OF DAILY LIVING (ADL)
ADLS_ACUITY_SCORE: 20

## 2022-08-30 NOTE — PROGRESS NOTES
Asked to see patient to review discharge in the setting of elevated BPs.   Reviewed options, risks, benefits and this patient is well known to me and understands her disease well and understands the risks of discharge to home. She is confident in her ability to review her readings at home and call us for interventions (inc or lowering meds) and when to present back to the hospital.     Aniya Mccormick MD

## 2022-08-30 NOTE — PLAN OF CARE
Goal Outcome Evaluation:    Plan of Care Reviewed With: patient, spouse     Overall Patient Progress: no change    Data: BP elevated (taking BP every hour until told otherwise) but other VSS, postpartum assessments WNL (bruising noticed above and below incision). She is voiding without difficulty, up ad annette, passing gas, eating and drinking normally. Incision appears to be healing well, lochia WNL, no s/s infection. Breastfeeding infant and pumping with no assistance. Taking Tylenol and Ibuprofen for pain. Reports good pain management.   Action: Education provided on blood pressures.  Response: Pt is agreeable with her plan of care. Positive attachment behaviors observed with infant. Support person, , present. Anticipate discharge based on BP.

## 2022-08-30 NOTE — PROVIDER NOTIFICATION
08/30/22 0912   Provider Notification   Provider Name/Title G2 Devendra   Method of Notification Electronic Page   Request Evaluate-Remote   Notification Reason Vital Signs Change     Pt had a BP of 147/110 at 0845, 15 min recheck was 141/98.

## 2022-08-30 NOTE — PROVIDER NOTIFICATION
08/29/22 2245   Provider Notification   Provider Name/Title G2 Devendra   Method of Notification Electronic Page   Request Evaluate-Remote   Notification Reason Status Update   FYI: BP's after new dosage of Labetalol 200 mg, and Procardia 60 mg; 135/100, 145/102, 134/94.

## 2022-08-30 NOTE — PROVIDER NOTIFICATION
08/30/22 1441   Provider Notification   Provider Name/Title Dr Pinto   Method of Notification Electronic Page   Request Evaluate in Person   Notification Reason Status Update     Pt would like to speak with you about discharging today. Her BPs are not great (136/101, 149/107, 152/114 recheck 149/108). Had 300mg labetalol at 1410.

## 2022-08-30 NOTE — PROGRESS NOTES
Post Partum Progress Note    Subjective:  She is resting comfortably in bed this morning. She has no complaints. She is eager to discharge today. Pain is well controlled on current medication regimen. Ambulating.  She is tolerating PO intake. Lochia present and similar to menses. Voiding spontaneously. She has passed flatus and had a BM yesterday. She denies dizziness or difficulty.  She denies headache, changes in vision, nausea/vomiting, chest pain, shortness of breath, RUQ pain, or worsening edema.  Plans to breastfeed, which is going well.     Objective:  Vitals:    22 0040 22 0619 22 0622 22 0636   BP: (!) 130/97  (!) 140/106 (!) 144/104   BP Location:   Left arm    Patient Position:   Semi-Durand's    Cuff Size:   Adult Regular    Pulse: 82  93 92   Resp: 17  17    Temp:   97.9  F (36.6  C)    TempSrc:   Oral    SpO2:       Weight:  91.3 kg (201 lb 4.8 oz)       General: NAD. A&Ox3. Ambulating around the room  CV: Regular rate, well perfused.   Pulm: Normal respiratory effort.  Abd: Soft, non-tender, non-distended. Fundus is firm and below the umbilicus.    Incision: c/d/i- ecchymosis superiorly  Ext: trace lower extremity edema bilaterally.     I/O last 3 completed shifts:  In: 2250 [P.O.:2250]  Out: 4200 [Urine:4200]    Vitals:    22 0612 22 0600 22 0619   Weight: 95.9 kg (211 lb 6.4 oz) 92.2 kg (203 lb 4.8 oz) 91.3 kg (201 lb 4.8 oz)       Assessment/Plan:  Rosemarie Johnson is a 32 year old  female who is POD#3 s/p RLTCS. Met criteria for SI preeclampsia with severe features in the perioperative period.    Superimposed PreE w/ SF  - IV labet 20/40/80, IV hydral 10 ( )  - s/p magnesium for 24h PP  - HELLP labs nl   - BP normal to low mild range, currently on PTA nifed 60 BID,   - PTA labet 200mg TID, nifedipine 60mg BID. BP medications have been adjusted multiple times postpartum due to a syncopal episode on the night of POD#0, and now are being increased  due to elevated BP. She has been persistently mild range, and this AM had a high mild range. Plan to increase to nifedipine 60 BID and labetalol 300 TID, and hydrochlorothiazide. Adjust meds as needed  - she has a BP cuff at home    Routine PP care  - Encourage routine post-operative goals including ambulation and incentive spirometry  - PNC: Rh pos. Rubella immune. No intervention indicated.  - Pain: controlled on oral medications  - Heme: Hgb 12.9>  (TXA)> 8.9>9.3  Asymptomatic from acute blood loss anemia. will discharge home with iron.  - GI: continue anti-emetics and stool softeners as needed.  - : voiding spontaneously  - Infant: Stable in room  - Feeding: breastfeeding.  - BC: plans IUD at 6w PP    Discharge to home today.  Close BP f/u tomorrow and <=1 week.     Pete Mobley MD  OB/GYN PGY-3  08/30/2022 7:44 AM     [facility] staff:  Appreciate note by Dr. Mobley.  I have seen and examined the patient without the resident. I have reviewed, edited, and agree with the note.      Tabitha Pinto MD, FACOG  8/30/2022  8:04 AM

## 2022-08-30 NOTE — PROVIDER NOTIFICATION
08/30/22 1032   Provider Notification   Provider Name/Title Dr Pinto   Method of Notification Electronic Page   Request Evaluate-Remote   Notification Reason Other      Is Pt still Ok to discharge with the BP this morning 147/110? Repeat was 141/98    After rechecked BP elevated: recommend delay discharge. Discussed risks of increased BPs, stroke, risk of need for readmission.     Pt strongly desires to discharge. She agrees to stay through afternoon to see if BPs normalize. She is on new regimen of     Nifedipine 60 BID- on this x over 4 hrs.   Labetalol 300 TID -first higher dose this  from 200 tid  hydrochlorothiazide - 25  Mg daily, now has gotten 2 doses total     A/p: recheck BPS q hour per pt request. I did recommend staying for BP monitoring until tomorrow, regardless of BPs in next several hours.  Pt states she hopes to discharge by this afternoon. Will discuss again after several BPs.     Tabitha Pinto MD, FACOG  (she/her/hers)    Department of Ob/Gyn/Women's Health  [employer]  [facility]  [address] [place] [address]. [place] [address]  [email or web address]  p. [phone]  f. [phone]  8/30/2022  11:30 AM

## 2022-08-30 NOTE — PLAN OF CARE
Pt is post Mag more than 24 hrs. VSS on RA, BP on mod range, MD ok'd to give hydrochlorothiazide and Labetalol 200 mg early this morning, and also MD added Labetalol 100 mg. Denies symptoms. DTR +2, no clonus. Ambulatory without dizziness. Fundus firm at 1 cm below umbilicus. Lochia scant. Adequate I&O. Passing flatus, denies N/V. Incisional  pain managed with Tylenol and Ibuprofen. Incision is GABY, derma bonded, c/d/i. Independent with breast feeding and supplementing baby with Donor milk per bottle.  is supportive with cares. Continue plan of care.   Vitals:    08/30/22 0040 08/30/22 0619 08/30/22 0622 08/30/22 0636   BP: (!) 130/97  (!) 140/106 (!) 144/104   BP Location:   Left arm    Patient Position:   Semi-Durand's    Cuff Size:   Adult Regular    Pulse: 82  93 92   Resp: 17  17    Temp:   97.9  F (36.6  C)    TempSrc:   Oral    SpO2:       Weight:  91.3 kg (201 lb 4.8 oz)

## 2022-08-30 NOTE — PLAN OF CARE
0771-3103:  Pt with continued elevated BP.  Dr Mccormick came to pt room at change of shift and okay'ed pt to discharge home.  Pt has blood pressure cuff at home and states she knows how to monitor BPs.  Reviewed signs/symptoms of worsening preeclampsia.  Reviewed discharge medications.  Reviewed follow-up for appointments for 1 week and 6 weeks postpartum.  Reviewed discharge instructions and answered all questions.  Discharged home with infant and all belongings at 1720.

## 2022-08-30 NOTE — PROVIDER NOTIFICATION
08/30/22 1540   Provider Notification   Provider Name/Title Dr Mccormick   Method of Notification In Department   Request Evaluate in Person   Notification Reason Vital Signs Change   Dr Mccormick on unit to talk with pt, notified of severe range TP=456/115 and plan to repeat at 1535.       Update:  Dr Mccormick spoke with pt and is planning to discharge pt.  Do not need to repeat BP per Dr Mccormick as pt is discharging.

## 2022-08-30 NOTE — DISCHARGE INSTRUCTIONS
Postop  Birth Instructions    Activity     Do not lift more than 10 pounds for 6 weeks after surgery.  Ask family and friends for help when you need it.  No driving until you have stopped taking your pain medications (usually two weeks after surgery).  No heavy exercise or activity for 6 weeks.  Don't do anything that will put a strain on your surgery site.  Don't strain when using the toilet.  Your care team may prescribe a stool softener if you have problems with your bowel movements.     To care for your incision:     Keep the incision clean and dry.  Do not soak your incision in water. No swimming or hot tubs until it has fully healed. You may soak in the bathtub if the water level is below your incision.  Do not use peroxide, gel, cream, lotion, or ointment on your incision.  Adjust your clothes to avoid pressure on your surgery site (check the elastic in your underwear for example).     You may see a small amount of clear or pink drainage and this is normal.  Check with your health care provider:     If the drainage increases or has an odor.  If the incision reddens, you have swelling, or develop a rash.  If you have increased pain and the medicine we prescribed doesn't help.  If you have a fever above 100.4 F (38 C) with or without chills when placing thermometer under your tongue.   The area around your incision (surgery wound), will feel numb.  This is normal. The numbness should go away in less than a year.     Keep your hands clean:  Always wash your hands before touching your incision (surgery wound). This helps reduce your risk of infection. If your hands aren't dirty, you may use an alcohol hand-rub to clean your hands. Keep your nails clean and short.            Call your healthcare provider if you have any of these symptoms:     You soak a sanitary pad with blood within 1 hour, or you see blood clots larger than a golf ball.  Bleeding that lasts more than 6 weeks.  Vaginal discharge that  smells bad.  Severe pain, cramping or tenderness in your lower belly area.  A need to urinate more frequently (use the toilet more often), more urgently (use the toilet very quickly), or it burns when you urinate.  Nausea and vomiting.  Redness, swelling or pain around a vein in your leg.  Problems breastfeeding or a red or painful area on your breast.  Chest pain and cough or are gasping for air.  Problems with coping with sadness, anxiety or depression. If you have concerns about hurting yourself or the baby, call your provider immediately.    You have questions or concerns after you return home.     Preeclampsia   Call your doctor right away if you have any of the following:  - Edema (swelling) in your face or hands  - Rapid weight gain-about 1 pound or more in a day  - Headache  - Abdominal pain on your right side  - Vision problems (flashes or spots)  - You have questions or concerns once you return home.

## 2022-09-02 ENCOUNTER — ALLIED HEALTH/NURSE VISIT (OUTPATIENT)
Dept: OBGYN | Facility: CLINIC | Age: 32
End: 2022-09-02
Attending: OBSTETRICS & GYNECOLOGY
Payer: COMMERCIAL

## 2022-09-02 VITALS — SYSTOLIC BLOOD PRESSURE: 124 MMHG | DIASTOLIC BLOOD PRESSURE: 89 MMHG | HEART RATE: 93 BPM

## 2022-09-02 DIAGNOSIS — O10.919 CHRONIC HYPERTENSION AFFECTING PREGNANCY: Primary | ICD-10-CM

## 2022-09-02 PROCEDURE — 999N000103 HC STATISTIC NO CHARGE FACILITY FEE

## 2022-09-02 PROCEDURE — 99207 PR NO BILLABLE SERVICE THIS VISIT: CPT

## 2022-09-02 NOTE — PROGRESS NOTES
Chief Complaint   Patient presents with     Allied Health Visit     B/P check   Charito Donaldson LPN

## 2022-09-07 ENCOUNTER — TELEPHONE (OUTPATIENT)
Dept: OBGYN | Facility: CLINIC | Age: 32
End: 2022-09-07

## 2022-09-07 NOTE — TELEPHONE ENCOUNTER
[facility] Call Center    Phone Message    May a detailed message be left on voicemail: yes     Reason for Call: Other: pt calling and wondering if short term disability papers have been filled out, please advise with them     Action Taken: Message routed to:  [facility] [phone]    Travel Screening: Not Applicable

## 2022-09-08 NOTE — TELEPHONE ENCOUNTER
Tried to reach Rosemarie, but received voicemail.  Left message that paperwork was completed and faxed today to Jimmie (filed in triage).    To call back if she wants a copy, with instructions on where to send it.

## 2022-09-09 ENCOUNTER — MYC MEDICAL ADVICE (OUTPATIENT)
Dept: OBGYN | Facility: CLINIC | Age: 32
End: 2022-09-09

## 2022-09-09 NOTE — TELEPHONE ENCOUNTER
She should come in for a BP check. As far as I can tell, she was discharged on labetalol 300mg TID, nifedipine 60mg BID,and a 10d course of hydrochlorothiazide on 8/30 - but it's unclear to me from her message that that is what she is taking now. She can hold the labetalol as it is making her feel poorly.    JR

## 2022-09-09 NOTE — TELEPHONE ENCOUNTER
Will route a message off to  to inquire about her blood pressure medication and how to move forward with it.

## 2022-09-09 NOTE — TELEPHONE ENCOUNTER
I did reach out to the patient to clarify what medication she is taking currently.    I asked the patient if she was taking Labetalol 300mg TID, Nifedipine 60mg BID and if she finished her hydrochlorothiazide?    Patient stated that she was sent home on those medication and just finished her hydrochlorothiazide today and had to lower her Labetalol from 300mg TID to Labetalol 200mg BID due to she almost passed out due to her blood pressure was too low on the 300mg dose.    Instructed the patient that Dr Mart stated to stop her Labetalol 200mg BID and to come in for a blood pressure check next week.     Patient is scheduled to come in on Tuesday 9-13-22 at 1100 for a BP check. Patient was instructed if her blood pressure is elevated over the weekend to call the on call number [phone]. Made sure that patient was aware to take her blood pressure before her medication. Patient stated that she does and then checks it a couple of hours later.    Patient verbalized understanding and in agreement with the plan.

## 2022-09-13 ENCOUNTER — ALLIED HEALTH/NURSE VISIT (OUTPATIENT)
Dept: OBGYN | Facility: CLINIC | Age: 32
End: 2022-09-13
Attending: OBSTETRICS & GYNECOLOGY
Payer: COMMERCIAL

## 2022-09-13 VITALS — SYSTOLIC BLOOD PRESSURE: 160 MMHG | DIASTOLIC BLOOD PRESSURE: 103 MMHG

## 2022-09-13 DIAGNOSIS — O10.919 CHRONIC HYPERTENSION AFFECTING PREGNANCY: Primary | ICD-10-CM

## 2022-09-13 PROCEDURE — 99207 PR NO BILLABLE SERVICE THIS VISIT: CPT

## 2022-09-13 NOTE — NURSING NOTE
S-(situation): patient came in today for a blood pressure check. Patient is on Nifedipine 60 mg BID and was told to stop taking her Labetalol 200 mg BID on 9-9-22 per Dr. Mart. Was instructed to come in today for just a blood pressure check.    B-(background): patient is 2 weeks postpartum    A-(assessment): patients blood pressure were in the 130's/90's except for the last one. Patient stated that the 130's/90's are her normal with some a little lower. Patient has chronic hypertension as well.    R-(recommendations): spoke with Dr. Alvarez to inquire follow up for this patient. Dr. Alvarez stated to stay on her Nifedipine 60 mg BID, to make sure she follows up with us in 4 weeks, and to follow up with her primary in 12 weeks for her blood pressure.    Let patient know the plan and patient stated she needs to find a primary and will either come here to see Dr Chacon or where she takes her children. Instructed patient to make sure and take her blood pressure before her medication and if she has any questions or concerns regarding her blood pressure to please call us at [phone].    Patient verbalized understanding and was in agreement with the plan.

## 2022-09-20 LAB
PATH REPORT.COMMENTS IMP SPEC: NORMAL
PATH REPORT.COMMENTS IMP SPEC: NORMAL
PATH REPORT.FINAL DX SPEC: NORMAL
PATH REPORT.GROSS SPEC: NORMAL
PATH REPORT.MICROSCOPIC SPEC OTHER STN: NORMAL
PATH REPORT.RELEVANT HX SPEC: NORMAL
PHOTO IMAGE: NORMAL

## 2022-10-10 ENCOUNTER — OFFICE VISIT (OUTPATIENT)
Dept: OBGYN | Facility: CLINIC | Age: 32
End: 2022-10-10
Attending: OBSTETRICS & GYNECOLOGY
Payer: COMMERCIAL

## 2022-10-10 VITALS
HEIGHT: 68 IN | SYSTOLIC BLOOD PRESSURE: 142 MMHG | BODY MASS INDEX: 28.51 KG/M2 | WEIGHT: 188.1 LBS | DIASTOLIC BLOOD PRESSURE: 90 MMHG

## 2022-10-10 DIAGNOSIS — Z30.430 ENCOUNTER FOR IUD INSERTION: Primary | ICD-10-CM

## 2022-10-10 DIAGNOSIS — Z30.430 ENCOUNTER FOR INSERTION OF INTRAUTERINE CONTRACEPTIVE DEVICE: ICD-10-CM

## 2022-10-10 PROCEDURE — 58300 INSERT INTRAUTERINE DEVICE: CPT | Performed by: OBSTETRICS & GYNECOLOGY

## 2022-10-10 PROCEDURE — 250N000011 HC RX IP 250 OP 636: Performed by: OBSTETRICS & GYNECOLOGY

## 2022-10-10 PROCEDURE — 99207 PR POST PARTUM EXAM: CPT | Performed by: OBSTETRICS & GYNECOLOGY

## 2022-10-10 PROCEDURE — G0463 HOSPITAL OUTPT CLINIC VISIT: HCPCS | Mod: 25 | Performed by: OBSTETRICS & GYNECOLOGY

## 2022-10-10 RX ADMIN — LEVONORGESTREL 20 MCG: 52 INTRAUTERINE DEVICE INTRAUTERINE at 11:01

## 2022-10-10 NOTE — LETTER
"10/10/2022       RE: Rosemarie Johnson  [address] [place] [address]     Dear Colleague,    Thank you for referring your patient, Rosemarie Johnson, to the [facility] [place] at [facility]. Please see a copy of my visit note below.              Nursing Notes:   Staci Phan CMA  10/10/2022 11:32 AM  Signed  SUBJECTIVE:   Rosemarie Johnson is here for her 6-week postpartum checkup.     PHQ-9 score:   Hx of Abuse:  No    Delivery Date: 22.    Delivering provider:  Tabitha Pinto MD.    Type of delivery:  .     Delivery complications: None  Infant gender:  girl, weight 4 pounds 12 oz.  Feeding Method:  .  Complications reported with feeding:  none, infant thriving .    Bleeding:  Light.  Duration:  6 weeks.  Menses resumed:  No  Bowel/Urinary problems:  No    Contraception Planned:  IUD Mirena  She  has not had intercourse since delivery..         History of chronic HTN, remains on Nifedipine 60 mg BID.  Prior to pregnancy was on 30 mg daily.      ================================================================  ROS: 10 point ROS neg other than the symptoms noted above in the HPI.       EXAM:  BP (!) 142/90   Ht 1.715 m (5' 7.52\")   Wt 85.3 kg (188 lb 1.6 oz)   LMP 2021   Breastfeeding Yes   BMI 29.01 kg/m      General: healthy, alert and no distress  Psych: NEGATIVE  Last PHQ-9 score on record= No Value exists for the : HP#PHQ  Breasts:  Lactating  Abdomen: Benign, Soft, flat, non-tender, No masses, organomegaly and Diastasis less than 1-2 FB  Incision:  well healed   Vulva:  Normal genitalia and Bartholin's, Urethra, [place]'s normal  Vagina:  normal with good muscle tone  Cervix:  Nulliparous, and no lesions.    Uterus:  fully involuted and non-tender    Adnexa:  Within normal limits and No masses, nodularity, tenderness  Recto-vaginal:   anus normal      IUD Insertion Note:      Time Out - \"Pause " "for the Cause\"  Just before the procedure begins, through verbal and active participation of team members, verify:    Initials   Patient Name      RRF   Patient Date of Birth RRF   Procedure to be performed  RRF     Consent:  Verbal consent obtained from patient. , Risks, benefits of treatment, and no treatment were discussed.  Patient's questions were elicited and answered. , Written consent signed and scanned into medical record. and Patient received and verbalized understanding of discharge instructions    After informed consent was obtained from the patient, a speculum was placed in the vagina to visualized the cervix.  The cervix was then swabbed with a betadine prep x 3.   Tenaculum was placed at the 12 o'clock position on the cervix and the uterus sounded to 8 cm.  The Mirena  IUD was then placed in the usual fashion under sterile technique.  Strings were clipped about 2 cm from the cervical os.  Tenaculum was removed and cervix was hemostatic.  There were no complications.  The patient tolerated the procedure well.    ASSESSMENT:   Encounter Diagnoses   Name Primary?     Encounter for IUD insertion Yes     Encounter for insertion of intrauterine contraceptive device      Postpartum care and examination of lactating mother       Normal postpartum exam after repeat c/s  Pregnancy was complicated by:  Chronic HTN.      PLAN:  Orders Placed This Encounter   Procedures     IUD (Insertion Intrauterine Device) [83218]      Orders Placed This Encounter   Medications     levonorgestrel (MIRENA) 20 MCG/DAY IUD 20 mcg        IUD string check in 6 weeks (or before return to work).  Establish care with Dr. Chacon in about 6 weeks as well for ongoing management of known chronic HTN.    Keyanna Alvarez MD, FACOG        Again, thank you for allowing me to participate in the care of your patient.      Sincerely,    Keyanna Alvarez MD      "

## 2022-10-10 NOTE — LETTER
Date:October 13, 2022      Provider requested that no letter be sent. Do not send.       [employer]

## 2022-10-10 NOTE — NURSING NOTE
SUBJECTIVE:   Rosemarie Johnson is here for her 6-week postpartum checkup.     PHQ-9 score:   Hx of Abuse:  No    Delivery Date: 22.    Delivering provider:  Tabitha Pinto MD.    Type of delivery:  .     Delivery complications: None  Infant gender:  girl, weight 4 pounds 12 oz.  Feeding Method:  .  Complications reported with feeding:  none, infant thriving .    Bleeding:  Light.  Duration:  6 weeks.  Menses resumed:  No  Bowel/Urinary problems:  No    Contraception Planned:  IUD Mirena  She  has not had intercourse since delivery..

## 2022-10-10 NOTE — PROGRESS NOTES
"    Nursing Notes:   Staci Phan, [facility]  10/10/2022 11:32 AM  Signed  SUBJECTIVE:   Rosemarie Johnson is here for her 6-week postpartum checkup.     PHQ-9 score:   Hx of Abuse:  No    Delivery Date: 22.    Delivering provider:  Tabitha Pinto MD.    Type of delivery:  .     Delivery complications: None  Infant gender:  girl, weight 4 pounds 12 oz.  Feeding Method:  .  Complications reported with feeding:  none, infant thriving .    Bleeding:  Light.  Duration:  6 weeks.  Menses resumed:  No  Bowel/Urinary problems:  No    Contraception Planned:  IUD Mirena  She  has not had intercourse since delivery..         History of chronic HTN, remains on Nifedipine 60 mg BID.  Prior to pregnancy was on 30 mg daily.      ================================================================  ROS: 10 point ROS neg other than the symptoms noted above in the HPI.       EXAM:  BP (!) 142/90   Ht 1.715 m (5' 7.52\")   Wt 85.3 kg (188 lb 1.6 oz)   LMP 2021   Breastfeeding Yes   BMI 29.01 kg/m      General: healthy, alert and no distress  Psych: NEGATIVE  Last PHQ-9 score on record= No Value exists for the : HP#PHQ  Breasts:  Lactating  Abdomen: Benign, Soft, flat, non-tender, No masses, organomegaly and Diastasis less than 1-2 FB  Incision:  well healed   Vulva:  Normal genitalia and Bartholin's, Urethra, [place]'s normal  Vagina:  normal with good muscle tone  Cervix:  Nulliparous, and no lesions.    Uterus:  fully involuted and non-tender    Adnexa:  Within normal limits and No masses, nodularity, tenderness  Recto-vaginal:   anus normal      IUD Insertion Note:      Time Out - \"Pause for the Cause\"  Just before the procedure begins, through verbal and active participation of team members, verify:    Initials   Patient Name      RRF   Patient Date of Birth RRF   Procedure to be performed  RRF     Consent:  Verbal consent obtained from patient. , Risks, benefits of treatment, and no treatment were " discussed.  Patient's questions were elicited and answered. , Written consent signed and scanned into medical record. and Patient received and verbalized understanding of discharge instructions    After informed consent was obtained from the patient, a speculum was placed in the vagina to visualized the cervix.  The cervix was then swabbed with a betadine prep x 3.   Tenaculum was placed at the 12 o'clock position on the cervix and the uterus sounded to 8 cm.  The Mirena  IUD was then placed in the usual fashion under sterile technique.  Strings were clipped about 2 cm from the cervical os.  Tenaculum was removed and cervix was hemostatic.  There were no complications.  The patient tolerated the procedure well.    ASSESSMENT:   Encounter Diagnoses   Name Primary?     Encounter for IUD insertion Yes     Encounter for insertion of intrauterine contraceptive device      Postpartum care and examination of lactating mother       Normal postpartum exam after repeat c/s  Pregnancy was complicated by:  Chronic HTN.      PLAN:  Orders Placed This Encounter   Procedures     IUD (Insertion Intrauterine Device) [23022]      Orders Placed This Encounter   Medications     levonorgestrel (MIRENA) 20 MCG/DAY IUD 20 mcg        IUD string check in 6 weeks (or before return to work).  Establish care with Dr. Chacon in about 6 weeks as well for ongoing management of known chronic HTN.    Keyanna Alvarez MD, FACOG

## 2022-10-31 ENCOUNTER — OFFICE VISIT (OUTPATIENT)
Dept: FAMILY MEDICINE | Facility: CLINIC | Age: 32
End: 2022-10-31
Payer: COMMERCIAL

## 2022-10-31 VITALS
WEIGHT: 183 LBS | RESPIRATION RATE: 16 BRPM | BODY MASS INDEX: 28.22 KG/M2 | HEART RATE: 76 BPM | DIASTOLIC BLOOD PRESSURE: 93 MMHG | SYSTOLIC BLOOD PRESSURE: 141 MMHG | OXYGEN SATURATION: 100 % | TEMPERATURE: 98.1 F

## 2022-10-31 DIAGNOSIS — S93.602A FOOT SPRAIN, LEFT, INITIAL ENCOUNTER: Primary | ICD-10-CM

## 2022-10-31 PROCEDURE — 99203 OFFICE O/P NEW LOW 30 MIN: CPT | Performed by: PHYSICIAN ASSISTANT

## 2022-10-31 NOTE — PATIENT INSTRUCTIONS
Ice for the next 2-3 days.  Modify activity.  Mild pain is okay as long as it resolves after a minute or 2 of discontinuing the activity.  Decreased level activity if pain is moderate to severe or last longer than a few minutes after discontinuing.  Voltaren gel 4 times a day for the next 1 to 2 weeks can be effective.  Has minimal side effects but can take a few days to begin working and have to be diligent about application.   Wear postop shoe

## 2022-10-31 NOTE — PROGRESS NOTES
Chief Complaint   Patient presents with     Foot Pain     Has left foot pain for 2 weeks pain increasing had no injury       ASSESSMENT/PLAN:  Rosemarie was seen today for foot pain.    Diagnoses and all orders for this visit:    Foot sprain, left, initial encounter  -     Ankle/Foot Bracing Supplies Order for DME - ONLY FOR DME    Likely secondary to altered biomechanics/compensation injury for acute on chronic arch pain.  Unlikely to be gout, Planter fasciitis or other    Ice for the next 2-3 days.  Modify activity.  Mild pain is okay as long as it resolves after a minute or 2 of discontinuing the activity.  Decreased level activity if pain is moderate to severe or last longer than a few minutes after discontinuing.  Voltaren gel 4 times a day for the next 1 to 2 weeks can be effective.  Has minimal side effects but can take a few days to begin working and have to be diligent about application.   Wear postop shoe    Dean Boyer PA-C      SUBJECTIVE:  Rosemarie is a 32 year old female who presents to urgent care with 2 weeks of left foot pain.  She deals with left arch pain that she sees a chiropractor for and has stretching and other exercises that helped.  She was getting better with this current flareup but went for a very long walk which significantly worsened the pain.  Now experiences pain when she stands on her toes or puts weight on the ball of her feet.  No acute injury.  No heel pain.  Does not get better as the day goes on.  Gets worse with activity    ROS: Pertinent ROS neg other than the symptoms noted above in the HPI.     OBJECTIVE:  BP (!) 141/93   Pulse 76   Temp 98.1  F (36.7  C) (Oral)   Resp 16   Wt 83 kg (183 lb)   LMP 12/11/2021   SpO2 100%   BMI 28.22 kg/m     GENERAL: healthy, alert and no distress  MS: Minimal edema over the left metatarsals, full range of motion, no significant tenderness or bony tenderness noted.  No heel tenderness, no calf tenderness  SKIN: no suspicious lesions or  rashes    DIAGNOSTICS    No results found for any visits on 10/31/22.     Current Outpatient Medications   Medication     calcium carbonate 750 MG CHEW     NIFEdipine ER (ADALAT CC) 60 MG 24 hr tablet     pantoprazole (PROTONIX) 40 MG EC tablet     Prenatal Vit-Fe Fumarate-FA (PRENATAL MULTIVITAMIN W/IRON) 27-0.8 MG tablet     acetaminophen (TYLENOL) 325 MG tablet     NIFEdipine ER OSMOTIC (ADALAT CC) 60 MG 24 hr tablet     No current facility-administered medications for this visit.      Patient Active Problem List   Diagnosis     High-risk pregnancy, unspecified trimester     h/o Vasa previa last pregnancy     History of placenta accreta in prior pregnancy, currently pregnant     Chronic hypertension affecting pregnancy     H/O postpartum hemorrhage, currently pregnant     COVID-19 vaccine series completed     History of prior pregnancy with IUGR      History of classical  section     Encounter for triage in pregnant patient     Status post  section     S/P  section      Past Medical History:   Diagnosis Date     Anemia of mother during pregnancy, delivered 3/28/2020    Formatting of this note might be different from the original. Pitx3, cytotec, hemabate, txax2 Hb 10.1 (from 13.6) Hemorrhage 1760 total      delivery delivered 3/27/2020    Last Assessment & Plan:  Formatting of this note might be different from the original. Rosemarie Johnson is a 30 year old  who was admitted at 30w1d with pregnancy complicated by chronic hypertension, vasa previa, single umbilical artery, and IUGR, 2%ile. She was admitted on 3/27 from a scheduled ultrasound for prolonged monitoring due to decelerations in the heart rate and intermitted reversed do     Chronic hypertension      Delayed or secondary postpartum hemorrhage 3/28/2020    Formatting of this note might be different from the original. 1003cc after expressed clots. cytotec 800, oxytocinx3     High-risk pregnancy, unspecified  trimester 2022     PCOS (polycystic ovarian syndrome)     dx age 12     Placenta accreta 2020    Formatting of this note might be different from the original. Pathology after delivery shows focal placenta accreta.  Patient currently asymptomatic     Vasa previa 2020     Past Surgical History:   Procedure Laterality Date     C ORAL SURGERY SINGLE TOOTH      removal of impacted tooth      SECTION N/A 2022    Procedure: REPEAT  SECTION;  Surgeon: Tabitha Pinto MD;  Location:  L+D     GYN SURGERY       WISDOM TOOTH EXTRACTION       Family History   Problem Relation Age of Onset     Hypertension Mother      Hypertension Father      Sleep Apnea Father      Hypertension Maternal Grandmother      Arthritis Maternal Grandmother      Parkinsonism Maternal Grandfather      Heart Disease Maternal Grandfather      Leukemia Maternal Grandfather      Heart Surgery Paternal Grandfather      Social History     Tobacco Use     Smoking status: Never     Smokeless tobacco: Never   Substance Use Topics     Alcohol use: Not Currently              The plan of care was discussed with the patient. They understand and agree with the course of treatment prescribed. A printed summary was given including instructions and medications.  The use of Dragon/[email or web address] dictation services may have been used to construct the content in this note; any grammatical or spelling errors are non-intentional. Please contact the author of this note directly if you are in need of any clarification.

## 2022-11-03 ENCOUNTER — OFFICE VISIT (OUTPATIENT)
Dept: PODIATRY | Facility: CLINIC | Age: 32
End: 2022-11-03
Payer: COMMERCIAL

## 2022-11-03 ENCOUNTER — ANCILLARY PROCEDURE (OUTPATIENT)
Dept: GENERAL RADIOLOGY | Facility: CLINIC | Age: 32
End: 2022-11-03
Attending: PODIATRIST
Payer: COMMERCIAL

## 2022-11-03 VITALS — BODY MASS INDEX: 28.22 KG/M2 | WEIGHT: 183 LBS | DIASTOLIC BLOOD PRESSURE: 86 MMHG | SYSTOLIC BLOOD PRESSURE: 124 MMHG

## 2022-11-03 DIAGNOSIS — M79.672 LEFT FOOT PAIN: Primary | ICD-10-CM

## 2022-11-03 DIAGNOSIS — G57.62 MORTON'S NEUROMA, LEFT: ICD-10-CM

## 2022-11-03 DIAGNOSIS — M79.672 LEFT FOOT PAIN: ICD-10-CM

## 2022-11-03 DIAGNOSIS — M76.822 POSTERIOR TIBIAL TENDONITIS, LEFT: ICD-10-CM

## 2022-11-03 PROCEDURE — 73630 X-RAY EXAM OF FOOT: CPT | Mod: TC | Performed by: RADIOLOGY

## 2022-11-03 PROCEDURE — 99203 OFFICE O/P NEW LOW 30 MIN: CPT | Mod: 25 | Performed by: PODIATRIST

## 2022-11-03 PROCEDURE — 64450 NJX AA&/STRD OTHER PN/BRANCH: CPT | Mod: LT | Performed by: PODIATRIST

## 2022-11-03 RX ORDER — TRIAMCINOLONE ACETONIDE 40 MG/ML
40 INJECTION, SUSPENSION INTRA-ARTICULAR; INTRAMUSCULAR
Status: DISCONTINUED | OUTPATIENT
Start: 2022-11-03 | End: 2023-12-15

## 2022-11-03 RX ORDER — BUPIVACAINE HYDROCHLORIDE 5 MG/ML
2 INJECTION, SOLUTION EPIDURAL; INTRACAUDAL
Status: DISCONTINUED | OUTPATIENT
Start: 2022-11-03 | End: 2023-12-15

## 2022-11-03 RX ADMIN — TRIAMCINOLONE ACETONIDE 40 MG: 40 INJECTION, SUSPENSION INTRA-ARTICULAR; INTRAMUSCULAR at 08:40

## 2022-11-03 RX ADMIN — BUPIVACAINE HYDROCHLORIDE 2 ML: 5 INJECTION, SOLUTION EPIDURAL; INTRACAUDAL at 08:40

## 2022-11-03 NOTE — PROGRESS NOTES
PATIENT HISTORY:   Rosemarie Johnson is a 32 year old female who presents to clinic for pain to the left foot.  Patient notes its been going on for 3 weeks.  Is a shooting pain states that she cannot put pressure on it.  Pain is 5 out of 10.  Worse with walking.  She was given a postop shoe and Voltaren gel after being seen in urgent care but that did not help.  Denies specific injury.  Wondering what is causing the pain and what can be done to help it.    Review of Systems:  Patient denies fever, chills, rash, wound, stiffness,  numbness, weakness, heart burn, blood in stool, chest pain with activity, calf pain when walking, shortness of breath with activity, chronic cough, easy bleeding/bruising, swelling of ankles, excessive thirst, fatigue, depression, anxiety.  Patient admits to limping.     PAST MEDICAL HISTORY:   Past Medical History:   Diagnosis Date     Anemia of mother during pregnancy, delivered 3/28/2020    Formatting of this note might be different from the original. Pitx3, cytotec, hemabate, txax2 Hb 10.1 (from 13.6) Hemorrhage 1760 total      delivery delivered 3/27/2020    Last Assessment & Plan:  Formatting of this note might be different from the original. Rosemarie Johnson is a 30 year old  who was admitted at 30w1d with pregnancy complicated by chronic hypertension, vasa previa, single umbilical artery, and IUGR, 2%ile. She was admitted on 3/27 from a scheduled ultrasound for prolonged monitoring due to decelerations in the heart rate and intermitted reversed do     Chronic hypertension      Delayed or secondary postpartum hemorrhage 3/28/2020    Formatting of this note might be different from the original. 1003cc after expressed clots. cytotec 800, oxytocinx3     High-risk pregnancy, unspecified trimester 2022     PCOS (polycystic ovarian syndrome)     dx age 12     Placenta accreta 2020    Formatting of this note might be different from the original. Pathology after delivery  shows focal placenta accreta.  Patient currently asymptomatic     Vasa previa 2020        PAST SURGICAL HISTORY:   Past Surgical History:   Procedure Laterality Date     C ORAL SURGERY SINGLE TOOTH      removal of impacted tooth      SECTION N/A 2022    Procedure: REPEAT  SECTION;  Surgeon: Tabitha Pinto MD;  Location:  L+D     GYN SURGERY       WISDOM TOOTH EXTRACTION          MEDICATIONS:   Current Outpatient Medications:      calcium carbonate 750 MG CHEW, Take 2 tablets by mouth, Disp: , Rfl:      NIFEdipine ER (ADALAT CC) 60 MG 24 hr tablet, Take 1 tablet (60 mg) by mouth 2 times daily, Disp: 90 tablet, Rfl: 1     NIFEdipine ER OSMOTIC (ADALAT CC) 60 MG 24 hr tablet, Take 1 tablet (60 mg) by mouth every 12 hours, Disp: 90 tablet, Rfl: 3     pantoprazole (PROTONIX) 40 MG EC tablet, Take 1 tablet (40 mg) by mouth every morning (before breakfast), Disp: 30 tablet, Rfl: 2     Prenatal Vit-Fe Fumarate-FA (PRENATAL MULTIVITAMIN W/IRON) 27-0.8 MG tablet, Take 1 tablet by mouth daily, Disp: , Rfl:      acetaminophen (TYLENOL) 325 MG tablet, Take 2 tablets (650 mg) by mouth every 6 hours as needed for mild pain Start after Delivery. (Patient not taking: Reported on 10/31/2022), Disp: 100 tablet, Rfl: 0     ALLERGIES:  No Known Allergies     SOCIAL HISTORY:   Social History     Socioeconomic History     Marital status:      Spouse name: Shun     Number of children: 1     Years of education: Not on file     Highest education level: Not on file   Occupational History     Occupation: marketings-full time   Tobacco Use     Smoking status: Never     Smokeless tobacco: Never   Substance and Sexual Activity     Alcohol use: Not Currently     Drug use: Never     Sexual activity: Yes     Partners: Male     Birth control/protection: None   Other Topics Concern     Not on file   Social History Narrative     Not on file     Social Determinants of Health     Financial Resource Strain: Not  on file   Food Insecurity: Not on file   Transportation Needs: Not on file   Physical Activity: Not on file   Stress: Not on file   Social Connections: Not on file   Intimate Partner Violence: Not on file   Housing Stability: Not on file        FAMILY HISTORY:   Family History   Problem Relation Age of Onset     Hypertension Mother      Hypertension Father      Sleep Apnea Father      Hypertension Maternal Grandmother      Arthritis Maternal Grandmother      Parkinsonism Maternal Grandfather      Heart Disease Maternal Grandfather      Leukemia Maternal Grandfather      Heart Surgery Paternal Grandfather         EXAM:Vitals: /86   Wt 83 kg (183 lb)   LMP 12/11/2021   BMI 28.22 kg/m      Uric acid: 6.4 (8/19/2022)    General appearance: Patient is alert and fully cooperative with history & exam.  No sign of distress is noted during the visit.     Psychiatric: Affect is pleasant & appropriate.  Patient appears motivated to improve health.     Respiratory: Breathing is regular & unlabored while sitting.     HEENT: Hearing is intact to spoken word.  Speech is clear.  No gross evidence of visual impairment that would impact ambulation.     Dermatologic: Skin is intact to both lower extremities without significant lesions, rash or abrasion.  No paronychia or evidence of soft tissue infection is noted.     Vascular: DP & PT pulses are intact & regular bilaterally.  No significant edema or varicosities noted.  CFT and skin temperature is normal to both lower extremities.     Neurologic: Lower extremity sensation is intact to light touch.  No evidence of weakness or contracture in the lower extremities.  No evidence of neuropathy.     Musculoskeletal: Patient is ambulatory without assistive device or brace.  Pain on palpation to the left third intermetatarsal space and with lateral compression of the foot.    Radiographs: left foot xray -  I personally reviewed the xrays -no fractures are noted.  Accessory navicular  is noted.     ASSESSMENT:    Left foot pain  Mata's neuroma, left  Posterior tibial tendonitis, left     Medical Decision Making/Plan:  Reviewed patient's chart in [facility].  Reviewed and discussed x-rays with patient.  We discussed causes and treatments of neuromas.  These included shoe gear, orthotics, metatarsal pads, cortisone injections, ice, physical therapy, and possible surgical removal.      At this time patient would like to try a cortisone injection into the area as she is lactating and does not want anything orally.  She is also fitted for a short Aircast boot.  She will wear this for the next 1 to 4 weeks depending on the continued pain.  If still painful after 4 weeks recommend follow-up in clinic and possibly MRI of the foot.    All questions were answered to patient's satisfaction and she will call for the questions or concerns.    Procedure: Small Joint Injection/Arthrocentesis: L third MTP    Date/Time: 11/3/2022 8:40 AM  Performed by: Jenn Mtz DPM, Podiatry/Foot and Ankle Surgery  Authorized by: Jenn Mtz DPM, Podiatry/Foot and Ankle Surgery   Indications:  Pain  Needle Size:  25 G  Guidance: landmark     Approach:  Dorsal  Location:  Third toe   Location comment:  Left third intermetatarsal nerve injection    Site:  L third MTP                    Medications:  40 mg triamcinolone 40 MG/ML; 2 mL bupivacaine (PF) 0.5 %        Outcome:  Tolerated well, no immediate complications  Procedure discussed: discussed risks, benefits, and alternatives    Consent Given by:  Patient  Timeout: timeout called immediately prior to procedure    Prep: patient was prepped and draped in usual sterile fashion              Patient risk factor: Patient is at low risk for infection.        Jenn Mtz DPM, Podiatry/Foot and Ankle Surgery

## 2022-11-03 NOTE — LETTER
11/3/2022         RE: Rosemarie Johnson  [address] [place] [address]        Dear Colleague,    Thank you for referring your patient, Rosemarie Johnson, to the [facility]. Please see a copy of my visit note below.    PATIENT HISTORY:   Rosemarie Johnson is a 32 year old female who presents to clinic for pain to the left foot.  Patient notes its been going on for 3 weeks.  Is a shooting pain states that she cannot put pressure on it.  Pain is 5 out of 10.  Worse with walking.  She was given a postop shoe and Voltaren gel after being seen in urgent care but that did not help.  Denies specific injury.  Wondering what is causing the pain and what can be done to help it.    Review of Systems:  Patient denies fever, chills, rash, wound, stiffness,  numbness, weakness, heart burn, blood in stool, chest pain with activity, calf pain when walking, shortness of breath with activity, chronic cough, easy bleeding/bruising, swelling of ankles, excessive thirst, fatigue, depression, anxiety.  Patient admits to limping.     PAST MEDICAL HISTORY:   Past Medical History:   Diagnosis Date     Anemia of mother during pregnancy, delivered 3/28/2020    Formatting of this note might be different from the original. Pitx3, cytotec, hemabate, txax2 Hb 10.1 (from 13.6) Hemorrhage 1760 total      delivery delivered 3/27/2020    Last Assessment & Plan:  Formatting of this note might be different from the original. Rosemarie Johnson is a 30 year old  who was admitted at 30w1d with pregnancy complicated by chronic hypertension, vasa previa, single umbilical artery, and IUGR, 2%ile. She was admitted on 3/27 from a scheduled ultrasound for prolonged monitoring due to decelerations in the heart rate and intermitted reversed do     Chronic hypertension      Delayed or secondary postpartum hemorrhage 3/28/2020    Formatting of this note might be different from the original. 1003cc after expressed  clots. cytotec 800, oxytocinx3     High-risk pregnancy, unspecified trimester 2022     PCOS (polycystic ovarian syndrome)     dx age 12     Placenta accreta 2020    Formatting of this note might be different from the original. Pathology after delivery shows focal placenta accreta.  Patient currently asymptomatic     Vasa previa 2020        PAST SURGICAL HISTORY:   Past Surgical History:   Procedure Laterality Date     C ORAL SURGERY SINGLE TOOTH      removal of impacted tooth      SECTION N/A 2022    Procedure: REPEAT  SECTION;  Surgeon: Tabitha Pinto MD;  Location:  L+D     GYN SURGERY       WISDOM TOOTH EXTRACTION          MEDICATIONS:   Current Outpatient Medications:      calcium carbonate 750 MG CHEW, Take 2 tablets by mouth, Disp: , Rfl:      NIFEdipine ER (ADALAT CC) 60 MG 24 hr tablet, Take 1 tablet (60 mg) by mouth 2 times daily, Disp: 90 tablet, Rfl: 1     NIFEdipine ER OSMOTIC (ADALAT CC) 60 MG 24 hr tablet, Take 1 tablet (60 mg) by mouth every 12 hours, Disp: 90 tablet, Rfl: 3     pantoprazole (PROTONIX) 40 MG EC tablet, Take 1 tablet (40 mg) by mouth every morning (before breakfast), Disp: 30 tablet, Rfl: 2     Prenatal Vit-Fe Fumarate-FA (PRENATAL MULTIVITAMIN W/IRON) 27-0.8 MG tablet, Take 1 tablet by mouth daily, Disp: , Rfl:      acetaminophen (TYLENOL) 325 MG tablet, Take 2 tablets (650 mg) by mouth every 6 hours as needed for mild pain Start after Delivery. (Patient not taking: Reported on 10/31/2022), Disp: 100 tablet, Rfl: 0     ALLERGIES:  No Known Allergies     SOCIAL HISTORY:   Social History     Socioeconomic History     Marital status:      Spouse name: Hsun     Number of children: 1     Years of education: Not on file     Highest education level: Not on file   Occupational History     Occupation: marketings-full time   Tobacco Use     Smoking status: Never     Smokeless tobacco: Never   Substance and Sexual Activity     Alcohol use:  Not Currently     Drug use: Never     Sexual activity: Yes     Partners: Male     Birth control/protection: None   Other Topics Concern     Not on file   Social History Narrative     Not on file     Social Determinants of Health     Financial Resource Strain: Not on file   Food Insecurity: Not on file   Transportation Needs: Not on file   Physical Activity: Not on file   Stress: Not on file   Social Connections: Not on file   Intimate Partner Violence: Not on file   Housing Stability: Not on file        FAMILY HISTORY:   Family History   Problem Relation Age of Onset     Hypertension Mother      Hypertension Father      Sleep Apnea Father      Hypertension Maternal Grandmother      Arthritis Maternal Grandmother      Parkinsonism Maternal Grandfather      Heart Disease Maternal Grandfather      Leukemia Maternal Grandfather      Heart Surgery Paternal Grandfather         EXAM:Vitals: /86   Wt 83 kg (183 lb)   LMP 12/11/2021   BMI 28.22 kg/m      Uric acid: 6.4 (8/19/2022)    General appearance: Patient is alert and fully cooperative with history & exam.  No sign of distress is noted during the visit.     Psychiatric: Affect is pleasant & appropriate.  Patient appears motivated to improve health.     Respiratory: Breathing is regular & unlabored while sitting.     HEENT: Hearing is intact to spoken word.  Speech is clear.  No gross evidence of visual impairment that would impact ambulation.     Dermatologic: Skin is intact to both lower extremities without significant lesions, rash or abrasion.  No paronychia or evidence of soft tissue infection is noted.     Vascular: DP & PT pulses are intact & regular bilaterally.  No significant edema or varicosities noted.  CFT and skin temperature is normal to both lower extremities.     Neurologic: Lower extremity sensation is intact to light touch.  No evidence of weakness or contracture in the lower extremities.  No evidence of neuropathy.     Musculoskeletal:  Patient is ambulatory without assistive device or brace.  Pain on palpation to the left third intermetatarsal space and with lateral compression of the foot.    Radiographs: left foot xray -  I personally reviewed the xrays -no fractures are noted.  Accessory navicular is noted.     ASSESSMENT:    Left foot pain  Mata's neuroma, left  Posterior tibial tendonitis, left     Medical Decision Making/Plan:  Review[facility] in [facility].  Reviewed and discussed x-rays with patient.  We discussed causes and treatments of neuromas.  These included shoe gear, orthotics, metatarsal pads, cortisone injections, ice, physical therapy, and possible surgical removal.      At this time patient would like to try a cortisone injection into the area as she is lactating and does not want anything orally.  She is also fitted for a short Aircast boot.  She will wear this for the next 1 to 4 weeks depending on the continued pain.  If still painful after 4 weeks recommend follow-up in clinic and possibly MRI of the foot.    All questions were answered to patient's satisfaction and she will call for the questions or concerns.    Procedure: Small Joint Injection/Arthrocentesis: L third MTP    Date/Time: 11/3/2022 8:40 AM  Performed by: Jenn Mtz DPM, Podiatry/Foot and Ankle Surgery  Authorized by: Jenn Mtz DPM, Podiatry/Foot and Ankle Surgery   Indications:  Pain  Needle Size:  25 G  Guidance: landmark     Approach:  Dorsal  Location:  Third toe   Location comment:  Left third intermetatarsal nerve injection    Site:  L third MTP                    Medications:  40 mg triamcinolone 40 MG/ML; 2 mL bupivacaine (PF) 0.5 %        Outcome:  Tolerated well, no immediate complications  Procedure discussed: discussed risks, benefits, and alternatives    Consent Given by:  Patient  Timeout: timeout called immediately prior to procedure    Prep: patient was prepped and draped in usual sterile fashion              Patient risk  factor: Patient is at low risk for infection.        Jenn Mtz DPM, Podiatry/Foot and Ankle Surgery        Again, thank you for allowing me to participate in the care of your patient.        Sincerely,        Jenn Mtz DPM, Podiatry/Foot and Ankle Surgery

## 2022-11-03 NOTE — PATIENT INSTRUCTIONS
Thank you for choosing [facility] / Foot & Ankle Surgery!    DR JETT'S CLINIC:  [facility]   [address]   [place] [address]      TRIAGE LINE: [phone]  APPOINTMENTS: [phone]  RADIOLOGY: [phone]  SET UP SURGERY: [phone]  FAX NUMBER: [phone]  BILLING QUESTIONS: [phone]       Follow up: 1 month if not improved.       New Balance inserts with Metatarsal pad or Birkenstocks.    [facility] LOCATIONS  [place]  [address] [place] [address]  [phone]   [address]  [phone] [place]  [address] [facility]  [phone]   [place]  [address]  [phone]   [place]  [address]  [phone] [place] [address]  [phone]   [place]  [address]  [phone]   [place]  [address] [place] [address]  [phone] [place]  [address]  [phone]        MATA'S NEUROMA   Mata's neuroma is an enlargement or thickening of a nerve in the foot. It is also sometimes referred to as an intermetatarsal neuroma, interdigital neuroma, Mata's metatarsalgia (pain in the metatarsal head area), radha-neural fibrosis (scar tissue around a nerve) or entrapment neuropathy (abnormal nerve due to compression). A Mata's neuroma most commonly occurs in the third interspace between the third and fourth toes, followed by the second interspace between the second and third toes. Mata's neuromas have also occurred in the fourth and first interspaces, but these are rare. If you have a Mata's neuroma, there is a 15% chance it will occur bilaterally (on both feet). Mata's neuromas occur most commonly in women who are between 30 to 50 years old. The reason they are more common in women is thought to be due to the shoes women wear.   CAUSES: A Mata's neuroma is thought to be caused by trauma to the nerve, but scientists are still not sure about the exact cause of the trauma. The  "trauma may be caused by the metatarsal heads, the deep transverse intermetatarsal ligament (holds the metatarsal heads together) or an intermetatarsal bursa (fluid-filled sac). All of these structures can cause compression/trauma on the nerve which initially causes swelling and injury in the nerve. Over time if the compression/trauma continues, the nerve repairs itself with very fibrous tissue that leads to enlargement and thickening of the nerve. Other causes of trauma to the nerve may include; overpronation (foot rolls inward), hypermobility (too much motion), cavo varus (high arch foot) and excessive dorsiflexion (toes bend upward) of the toes. These biomechanical (howthe foot moves) factors may cause trauma to the nerve with every step. If the nerve becomes irritated and enlarged then it takes up more space and gets even more compressed and irritated. It becomes a vicious cycle.   SIGNS & SYMPTOMS  - Pain (sharp, stabbing, throbbing, shooting)   - Numbness   - Tingling or \"pins & needles\"   - Burning   - Cramping   - Feeling that you are stepping on something or that something is in your shoe   - Initially the symptoms may happen once in a while, but as the condition gets worse, the symptoms may happen all of the time   - It usually feels better by taking off your shoe and massaging your foot     DIAGNOSIS: Your podiatrist will ask many questions about your signs and symptoms and will perform a physical exam. Some of the exams may include a web space compression test. This is done by squeezing the metatarsals together with one hand and using the thumb and index finger of the other hand to compress the affected web space to reproduce the pain/symptoms. A palpable click (Ortega's click) is usually present. This test may also cause pain to shoot into the toes and that is called a Tinel's sign. John's test involves squeezing the metatarsals together and moving the toes up and down for 30 seconds. This will " usually cause pain or it will bring on your other symptoms. Powers's sign is positive when you stand and the affected toes spread apart. A Mata's neuroma is usually diagnosed based on the history and physical exam findings, but sometimes other tests such as an x-ray, ultrasound or an MRI are needed.   TREATMENT  1.  Footwear Changes: Wear shoes that are wide and deep in the toe box so they  do not put pressure on your toes and metatarsals. Avoid wearing high heels because they cause increased pressure on the ball of your foot (forefoot).    2.  Metatarsal Pads: These help to lift and separate the metatarsal heads to take pressure off of the nerve. They are placed just behind where you feel the pain, not on top of the painful spot.   3.  Activity modification: For example, you may try swimming instead of running until your symptoms go away.   4.  Taping   5.  Icing   6.  NSAIDs (anti-inflammatories): aleve, ibuprofen, etc.   7.  Arch Supports or Orthotics: These help to control some of the abnormal motion in your feet. The abnormal motion can lead to extra torque and pressure on the nerve.   8.  Physical Therapy  9.  Cortisone Injection: Helps to decrease the size of the irritated, enlarged nerve.   10.  Sclerosing Alcohol Injection: Helps to destroy the nerve chemically, which causes permanent numbness    SURGERY  If conservative treatment does not help surgery may be needed. Surgery may involve cutting out the nerve or cutting the intermetatarsalligament. Studies have shown surgery has an 80-85% success rate.  Will result in numbness    PREVENTION  -Avoid wearing narrow, pointed toe shoes, or high heels     TENDONITIS   Tendons are the strong fibrous portions of muscles that attach to bones and allow the muscle to move a joint when it contracts. Tendons are very strong because they have a lot of force exerted on them. Sometimes tendons can become painful because they have suffered an acute injury, in which too  much force was exerted at one time, or an overuse injury, in which a normal force was exerted too frequently or over a prolonged period of time. As a result, there is damage to the tendon and its surrounding soft tissue structures and they become inflammed. Because tendons do not have a great blood supply, they do not heal rapidly and the inflammation can become chronic.   Conservative treatment for tendinitis involves rest and anti-inflammatory measures. Ice is applied 15 minutes 2-3 times daily. Anti-inflammatory medications called NSAIDs (ibuprofen, example) can be taken provided they are used with caution, as they can lead to internal bleeding and increase the risk ofstroke and heart attack. Sometimes topical nitroglycerin is prescribed to help with pain. Often your doctor will use a special shoe or removable walking cast to immobilize the tendon, allowing it to heal without further damage from use. These devices are very useful in helping tendons heal, but they may slow you down or make you feel like your hip, knee, or back are out ofalignment. This is temporary and should go away once you are out ofthe immobilization. You should not use a walking cast when showering or driving. Another option is Platelet Rich Plasma injections. (Normally done with a Sports and Orthorapedic doctor.   If conservative measures fail, your physician may need to surgically repair the tendon by removing any chronic inflammatory tissue and sewing it back together. Sometimes it is sewn to an adjacent tendon with similar function for support and sometimes it is lengthened. . Sometimes the bones around the tendon need to be realigned or reshaped to better support the tendon or prevent further damage. Your foot and ankle surgeon will discuss the specifics of your surgery with you, should you need it.    Towel stretch: Sit on a hard surface with your injured leg stretched out in front of you. Loop a towel around your toes and the ball of  your foot and pull the towel toward your body keeping your leg straight. Hold this position for 15 to 30 seconds and then relax. Repeat 3 times. Then push the towel away with the ball of your foot. Repeat 3 times.  When you don't feel much of a stretch using the towel, you can start the standing calf stretch and the following exercises.  Standing calf stretch: Stand facing a wall with your hands on the wall at about eye level. Keep your injured leg back with your heel on the floor. Keep the other leg forward with the knee bent. Turn your back foot slightly inward (as if you were pigeon-toed). Slowly lean into the wall until you feel a stretch in the back of your calf. Hold the stretch for 15 to 30 seconds. Return to the starting position. Repeat 3 times. Do this exercise several times each day.   Standing soleus stretch: Stand facing a wall with your hands on the wall at about chest height. Keep your injured leg back with your heel on the floor. Keep the other leg forward with the knee bent. Turn your back foot slightly inward (as if you were pigeon-toed). Bend your back knee slightly and gently lean into the wall until you feel a stretch in the lower calf of your injured leg. Hold the stretch for 15 to 30 seconds. Return to the starting position. Repeat 3 times.   Achilles stretch: Stand with the ball of one foot on a stair. Reach for the step below with your heel until you feel a stretch in the arch of your foot. Hold this position for 15 to 30 seconds and then relax. Repeat 3 times.   Heel raise: Balance yourself while standing behind a chair or counter. Using the chair or counter as a support to help you, raise your body up onto your toes and hold for 5 seconds. Then slowly lower yourself down without holding onto the support. (It's OK to keep holding onto the support if you need to.) When this exercise becomes less painful, try lowering yourself down on the injured leg only. Repeat 15 times. Do 2 sets of 15.  Rest 30 seconds between sets.   Step-up: Stand with the foot of your injured leg on a support 3 to 5 inches high (like a small step or block of wood). Keep your other foot flat on the floor. Shift your weight onto the injured leg on the support. Straighten your injured leg as the other leg comes off the floor. Return to the starting position by bending your injured leg and slowly lowering your uninjured leg back to the floor. Do 2 sets of 15.   Resisted ankle eversion: Sit with both legs stretched out in front of you, with your feet about a shoulder's width apart. Tie a loop in one end of elastic tubing. Put the foot of your injured leg through the loop so that the tubing goes around the arch of that foot and wraps around the outside of the other foot. Hold onto the other end of the tubing with your hand to provide tension. Turn the foot of your injured leg up and out. Make sure you keep your other foot still so that it will allow the tubing to stretch as you move the foot of your injured leg. Return to the starting position. Do 2 sets of 15.   Balance and reach exercises: Stand next to a chair with your injured leg farther from the chair. The chair will provide support if you need it. Stand on the foot of your injured leg and bend your knee slightly. Try to raise the arch of this foot while keeping your big toe on the floor. Keep your foot in this position. With the hand that is farther away from the chair, reach forward in front of you by bending at the waist. Avoid bending your knee any more as you do this. Repeat this 10 times. To make the exercise more challenging, reach farther in front of you. Do 2 sets of 10.  the same position as above. While keeping your arch height, reach the hand that is farther away from the chair across your body toward the chair. The farther you reach, the more challenging the exercise. Do 2 sets of 10.   Resisted ankle eversion: Sit with both legs stretched out in front of  you, with your feet about a shoulder's width apart. Tie a loop in one end of elastic tubing. Put the foot of your injured leg through the loop so that the tubing goes around the arch of that foot and wraps around the outside of the other foot. Hold onto the other end of the tubing with your hand to provide tension. Turn the foot of your injured leg up and out. Make sure you keep your other foot still so that it will allow the tubing to stretch as you move the foot of your injured leg. Return to the starting position. Do 2 sets of 15.   If you have access to a wobble board, do the following exercises:  Wobble board exercises:   Stand on a wobble board with your feet shoulder width apart. Rock the board forwards and backwards 30 times, then side to side 30 times. Hold on to a chair if you need support.   Rotate the wobble board around so that the edge of the board is in contact with the floor at all times. Do this 30 times in a clockwise and then a counterclockwise direction.   Balance on the wobble board for as long as you can without letting the edges touch the floor. Try to do this for 2 minutes without touching the floor.   Rotate the wobble board in clockwise and counterclockwise circles, but do not let the edge of the board touch the floor.   When you have mastered exercises A through D, try repeating them while standing on just your injured leg.   After you are able to do these exercises on one leg, try to do them with your eyes closed. Make sure you have something nearby to support you in case you lose your balance.

## 2022-11-18 ENCOUNTER — OFFICE VISIT (OUTPATIENT)
Dept: FAMILY MEDICINE | Facility: CLINIC | Age: 32
End: 2022-11-18
Attending: FAMILY MEDICINE
Payer: COMMERCIAL

## 2022-11-18 VITALS
WEIGHT: 187.1 LBS | BODY MASS INDEX: 28.36 KG/M2 | HEART RATE: 76 BPM | SYSTOLIC BLOOD PRESSURE: 132 MMHG | DIASTOLIC BLOOD PRESSURE: 91 MMHG | HEIGHT: 68 IN

## 2022-11-18 DIAGNOSIS — Z30.431 IUD CHECK UP: ICD-10-CM

## 2022-11-18 DIAGNOSIS — I10 CHRONIC HYPERTENSION: Primary | ICD-10-CM

## 2022-11-18 PROBLEM — Z36.89 ENCOUNTER FOR TRIAGE IN PREGNANT PATIENT: Status: RESOLVED | Noted: 2022-07-28 | Resolved: 2022-11-18

## 2022-11-18 PROBLEM — O09.299: Status: RESOLVED | Noted: 2022-01-31 | Resolved: 2022-11-18

## 2022-11-18 PROBLEM — Z98.891 S/P CESAREAN SECTION: Status: RESOLVED | Noted: 2022-08-27 | Resolved: 2022-11-18

## 2022-11-18 PROBLEM — Z98.891 STATUS POST CESAREAN SECTION: Status: RESOLVED | Noted: 2022-08-27 | Resolved: 2022-11-18

## 2022-11-18 PROBLEM — O09.299 H/O POSTPARTUM HEMORRHAGE, CURRENTLY PREGNANT: Status: RESOLVED | Noted: 2022-01-31 | Resolved: 2022-11-18

## 2022-11-18 PROBLEM — Z87.59 HISTORY OF PRIOR PREGNANCY WITH IUGR NEWBORN: Status: RESOLVED | Noted: 2022-01-31 | Resolved: 2022-11-18

## 2022-11-18 PROBLEM — O09.90 HIGH-RISK PREGNANCY, UNSPECIFIED TRIMESTER: Status: RESOLVED | Noted: 2022-01-31 | Resolved: 2022-11-18

## 2022-11-18 PROBLEM — Z98.891 HISTORY OF CLASSICAL CESAREAN SECTION: Status: RESOLVED | Noted: 2022-06-20 | Resolved: 2022-11-18

## 2022-11-18 PROBLEM — Z92.29 COVID-19 VACCINE SERIES COMPLETED: Status: RESOLVED | Noted: 2022-01-31 | Resolved: 2022-11-18

## 2022-11-18 PROCEDURE — G0463 HOSPITAL OUTPT CLINIC VISIT: HCPCS

## 2022-11-18 PROCEDURE — 99214 OFFICE O/P EST MOD 30 MIN: CPT | Performed by: FAMILY MEDICINE

## 2022-11-18 RX ORDER — LOSARTAN POTASSIUM 50 MG/1
50 TABLET ORAL DAILY
Qty: 90 TABLET | Refills: 0 | Status: SHIPPED | OUTPATIENT
Start: 2022-11-18 | End: 2022-12-16

## 2022-11-18 NOTE — PROGRESS NOTES
CC: Follow Up (BP follow up and IUD string check)      SUBJECTIVE:  Rosemarie Johnson presents for follow-up of chronic hypertension. Rosemarie reports a long standing history of elevated blood pressures.  In , when she was planning to get pregnant for the first time, she went to [facility] in [place] and asked if she should start on antihypertensives, but the doctor stated that she did not need medicines yet.  She became pregnant shortly after, during her pregnancy was diagnosed with chronic hypertension started nifedipine.  After she delivered and was done breast-feeding, she was switched to losartan 50 mg daily postpartum.  When she started planning for this pregnancy, then switch back to nifedipine.  She was diagnosed with preeclampsia with severe features during pregnancy. Delivered 2022 by . She was on nifedepine 30 mg daily prior to pregnancy. Now, Rosemarie has been taking nifedipine 60 mg twice daily.  She is also prescribed labetalol 200 mg twice daily, but stated she had labetalol due to side effects.  Overall, she has tolerated nifedipine well, and also tolerated losartan well without side effects.    Most recent surveillance labs reviewed today.  Labs the day after delivery in August showed normal renal function.  She is feeling well and denies any symptoms of headache, blurry vision, ringing or buzzing in ears, chest pain, cough, SOB, palpitations, orthopnea, PND or peripheral edema. There has not been any syncope, presyncope, or symptoms suggestive of TIA or stroke or lower extremity claudication.  As of this morning, she believes she is done breast-feeding.  She goes back to work next week.  She is working on slowly increasing her physical activity, and working with physical therapy on some back pain and foot pain.    She is a non-smoker.  Social alcohol use.  No drug use.    Has a home BP Cuff, 126/85 average at home.  She brought in her blood pressure cuff at the beginning of pregnancy  "which showed that her blood pressure cuff is accurate.    Recent clinic visits show blood pressure readings -  124/86  141/93  142/90    Mom, grandmothers, great-grandmothers all have HTN  Had w/u after her 1st pregnancy for secondary HTN which was negative, also worked on weight loss to improve blood pressure.    For contraception, she had a Mirena IUD inserted at her postpartum visit on October 10.  Her  is Planning a vasectomy.  Mirena is working well, is noting a little bit of spotting.      OBJECTIVE:  BP (!) 132/91   Pulse 76   Ht 1.715 m (5' 7.52\")   Wt 84.9 kg (187 lb 1.6 oz)   LMP 12/11/2021   Breastfeeding No   BMI 28.85 kg/m    General: healthy, alert, and in no distress.  Lungs: CTAB.  Heart: RRR. No murmur.  Extremities: Warm. Well-perfused. No cyanosis or edema.  Gyn: Speculum placed in vagina, cervix visualized, IUD strings about 2 cm from the cervical os.     Last Comprehensive Metabolic Panel:  Sodium   Date Value Ref Range Status   07/29/2022 137 133 - 144 mmol/L Final     Potassium   Date Value Ref Range Status   07/29/2022 3.6 3.4 - 5.3 mmol/L Final     Chloride   Date Value Ref Range Status   07/29/2022 106 94 - 109 mmol/L Final     Carbon Dioxide (CO2)   Date Value Ref Range Status   07/29/2022 23 20 - 32 mmol/L Final     Anion Gap   Date Value Ref Range Status   07/29/2022 8 3 - 14 mmol/L Final     Glucose   Date Value Ref Range Status   07/29/2022 146 (H) 70 - 99 mg/dL Final     Urea Nitrogen   Date Value Ref Range Status   07/29/2022 11 7 - 30 mg/dL Final     Creatinine   Date Value Ref Range Status   08/28/2022 0.71 0.52 - 1.04 mg/dL Final     GFR Estimate   Date Value Ref Range Status   08/28/2022 >90 >60 mL/min/1.73m2 Final     Comment:     Effective December 21, 2021 eGFRcr in adults is calculated using the 2021 CKD-EPI creatinine equation which includes age and gender (Kimberlyn et al., NEJ, DOI: 10.1056/OSLIux3043493)     Calcium   Date Value Ref Range Status   07/29/2022 8.9 " 8.5 - 10.1 mg/dL Final         ASSESSMENT/PLAN:  HTN, borderline control - Rosemarie has not future pregnancy plans, Mirena IUD in place for contraception, and is finished breastfeeding. Will switch from nifedipine to losartan 50 mg daily for optimal BP control. I discussed with the patient the risks, benefits, and alternatives to taking this medication as well as common side effects. Check BP and BMP in 2-3 weeks and will titrate medication as needed. Worrisome signs and symptoms were discussed with Rosemarie and she was instructed to return to the clinic for concerning symptoms or to call with questions.    IUD string check - IUD strings visualized and appropriate length.  eventually planning on vasectomy.       Vonda Chacon MD  Family Medicine

## 2022-12-16 ENCOUNTER — LAB (OUTPATIENT)
Dept: LAB | Facility: CLINIC | Age: 32
End: 2022-12-16
Attending: FAMILY MEDICINE
Payer: COMMERCIAL

## 2022-12-16 ENCOUNTER — OFFICE VISIT (OUTPATIENT)
Dept: FAMILY MEDICINE | Facility: CLINIC | Age: 32
End: 2022-12-16
Attending: FAMILY MEDICINE
Payer: COMMERCIAL

## 2022-12-16 VITALS
WEIGHT: 187 LBS | DIASTOLIC BLOOD PRESSURE: 90 MMHG | HEART RATE: 78 BPM | RESPIRATION RATE: 18 BRPM | HEIGHT: 68 IN | SYSTOLIC BLOOD PRESSURE: 130 MMHG | BODY MASS INDEX: 28.34 KG/M2

## 2022-12-16 DIAGNOSIS — I10 CHRONIC HYPERTENSION: ICD-10-CM

## 2022-12-16 DIAGNOSIS — I10 CHRONIC HYPERTENSION: Primary | ICD-10-CM

## 2022-12-16 LAB
ANION GAP SERPL CALCULATED.3IONS-SCNC: 4 MMOL/L (ref 3–14)
BUN SERPL-MCNC: 13 MG/DL (ref 7–30)
CALCIUM SERPL-MCNC: 8.9 MG/DL (ref 8.5–10.1)
CHLORIDE BLD-SCNC: 107 MMOL/L (ref 94–109)
CO2 SERPL-SCNC: 27 MMOL/L (ref 20–32)
CREAT SERPL-MCNC: 0.66 MG/DL (ref 0.52–1.04)
GFR SERPL CREATININE-BSD FRML MDRD: >90 ML/MIN/1.73M2
GLUCOSE BLD-MCNC: 87 MG/DL (ref 70–99)
POTASSIUM BLD-SCNC: 3.9 MMOL/L (ref 3.4–5.3)
SODIUM SERPL-SCNC: 138 MMOL/L (ref 133–144)

## 2022-12-16 PROCEDURE — 80048 BASIC METABOLIC PNL TOTAL CA: CPT

## 2022-12-16 PROCEDURE — 99212 OFFICE O/P EST SF 10 MIN: CPT | Performed by: FAMILY MEDICINE

## 2022-12-16 PROCEDURE — 99213 OFFICE O/P EST LOW 20 MIN: CPT | Performed by: FAMILY MEDICINE

## 2022-12-16 PROCEDURE — 36415 COLL VENOUS BLD VENIPUNCTURE: CPT

## 2022-12-16 RX ORDER — LOSARTAN POTASSIUM 50 MG/1
50 TABLET ORAL DAILY
Qty: 90 TABLET | Refills: 3 | Status: SHIPPED | OUTPATIENT
Start: 2022-12-16 | End: 2023-11-30

## 2022-12-16 NOTE — NURSING NOTE
Chief Complaint   Patient presents with     Follow Up     Follow up for bp ... pt brought her bp readings from home

## 2022-12-16 NOTE — PROGRESS NOTES
CC: Follow Up (Follow up for bp ... pt brought her bp readings from home)      SUBJECTIVE:  Rosemarie Johnson presents for follow-up of chronic hypertension. Rosemarie reports a long standing history of elevated blood pressures.  In , when she was planning to get pregnant for the first time, she went to [facility] in [place] and asked if she should start on antihypertensives, but the doctor stated that she did not need medicines yet.  She became pregnant shortly after, during her pregnancy was diagnosed with chronic hypertension started nifedipine.  After she delivered and was done breast-feeding, she was switched to losartan 50 mg daily postpartum.  When she started planning for this pregnancy, then switch back to nifedipine.  She was diagnosed with preeclampsia with severe features during pregnancy. Delivered 2022 by . She was on nifedepine 30 mg daily prior to pregnancy. At last visit on 2022, nifedipine switched to losartan 50 mg daily. She is feeling much better from switching from nifedipine to losartan. She has been checking BP at home. She has her BP readings - 128/85 at home is the highest BP reading. Lowest number is 117 systolic.  She brought in her blood pressure cuff at the beginning of pregnancy which showed that her blood pressure cuff is accurate.    She does get nervous coming in to the doctor so feels that is the reason BP is elevated today; plus she was running late.     Most recent surveillance labs reviewed today.  Labs the day after delivery in August showed normal renal function.  She is feeling well and denies any symptoms of headache, blurry vision, ringing or buzzing in ears, chest pain, cough, SOB, palpitations, orthopnea, PND or peripheral edema. There has not been any syncope, presyncope, or symptoms suggestive of TIA or stroke or lower extremity claudication.      She is a non-smoker.  Social alcohol use.  No drug use.      OBJECTIVE:  BP (!) 130/90 (BP Location: Left  "arm, Patient Position: Sitting, Cuff Size: Adult Regular)   Pulse 78   Resp 18   Ht 1.715 m (5' 7.52\")   Wt 84.8 kg (187 lb)   BMI 28.84 kg/m    General: healthy, alert, and in no distress.  Heart: RRR. No murmur.  Extremities: Warm. Well-perfused. No cyanosis or edema.    Last Comprehensive Metabolic Panel:  Sodium   Date Value Ref Range Status   07/29/2022 137 133 - 144 mmol/L Final     Potassium   Date Value Ref Range Status   07/29/2022 3.6 3.4 - 5.3 mmol/L Final     Chloride   Date Value Ref Range Status   07/29/2022 106 94 - 109 mmol/L Final     Carbon Dioxide (CO2)   Date Value Ref Range Status   07/29/2022 23 20 - 32 mmol/L Final     Anion Gap   Date Value Ref Range Status   07/29/2022 8 3 - 14 mmol/L Final     Glucose   Date Value Ref Range Status   07/29/2022 146 (H) 70 - 99 mg/dL Final     Urea Nitrogen   Date Value Ref Range Status   07/29/2022 11 7 - 30 mg/dL Final     Creatinine   Date Value Ref Range Status   08/28/2022 0.71 0.52 - 1.04 mg/dL Final     GFR Estimate   Date Value Ref Range Status   08/28/2022 >90 >60 mL/min/1.73m2 Final     Comment:     Effective December 21, 2021 eGFRcr in adults is calculated using the 2021 CKD-EPI creatinine equation which includes age and gender (Kimberlyn et al., NE, DOI: 10.1056/ZDODlr6877427)     Calcium   Date Value Ref Range Status   07/29/2022 8.9 8.5 - 10.1 mg/dL Final         ASSESSMENT/PLAN:  HTN, well controlled athome - Continue losartan 50 mg daily for BP control. Check BMP today to monitor renal function and electrolytes. Worrisome signs and symptoms were discussed with Rosemarie and she was instructed to return to the clinic for concerning symptoms or to call with questions.      Vonda Chacon MD  Family Medicine    "

## 2023-02-11 ENCOUNTER — MYC MEDICAL ADVICE (OUTPATIENT)
Dept: FAMILY MEDICINE | Facility: CLINIC | Age: 33
End: 2023-02-11
Payer: COMMERCIAL

## 2023-02-20 DIAGNOSIS — I10 CHRONIC HYPERTENSION: ICD-10-CM

## 2023-04-16 ENCOUNTER — HEALTH MAINTENANCE LETTER (OUTPATIENT)
Age: 33
End: 2023-04-16

## 2023-05-21 RX ORDER — LOSARTAN POTASSIUM 50 MG/1
TABLET ORAL
Qty: 90 TABLET | Refills: 3 | OUTPATIENT
Start: 2023-05-21

## 2023-11-29 DIAGNOSIS — I10 CHRONIC HYPERTENSION: ICD-10-CM

## 2023-11-30 ENCOUNTER — MYC REFILL (OUTPATIENT)
Dept: FAMILY MEDICINE | Facility: CLINIC | Age: 33
End: 2023-11-30
Payer: COMMERCIAL

## 2023-11-30 DIAGNOSIS — I10 CHRONIC HYPERTENSION: ICD-10-CM

## 2023-12-01 DIAGNOSIS — I10 CHRONIC HYPERTENSION: ICD-10-CM

## 2023-12-01 RX ORDER — LOSARTAN POTASSIUM 50 MG/1
50 TABLET ORAL DAILY
Qty: 30 TABLET | Refills: 0 | Status: SHIPPED | OUTPATIENT
Start: 2023-12-01 | End: 2023-12-14

## 2023-12-04 RX ORDER — LOSARTAN POTASSIUM 50 MG/1
50 TABLET ORAL DAILY
Qty: 90 TABLET | Refills: 3 | OUTPATIENT
Start: 2023-12-04

## 2023-12-06 RX ORDER — LOSARTAN POTASSIUM 50 MG/1
50 TABLET ORAL DAILY
Qty: 90 TABLET | Refills: 3 | OUTPATIENT
Start: 2023-12-06

## 2023-12-14 NOTE — PROGRESS NOTES
CC: Medication Refill      SUBJECTIVE:  Rosemarie Johnson presents for follow-up of chronic hypertension, currently taking losartan 50 mg daily.     In 2019, when she was planning to get pregnant for the first time, she went to [facility] in [place] and asked if she should start on antihypertensives, but the doctor stated that she did not need medicines yet.  She became pregnant shortly after, during her pregnancy was diagnosed with chronic hypertension started nifedipine.  After she delivered and was done breast-feeding, she was switched to losartan 50 mg daily postpartum.  When she started planning for this pregnancy, then switch back to nifedipine.  She was diagnosed with preeclampsia with severe features during pregnancy. Delivered 2022 by . At visit on 2022, nifedipine switched to losartan 50 mg daily. She has not been checking BP at home this year. But overall she feels well.     Most recent surveillance labs reviewed today (2022).  She is feeling well and denies any symptoms of headache, blurry vision, ringing or buzzing in ears, chest pain, cough, SOB, palpitations, orthopnea, PND or peripheral edema. There has not been any syncope, presyncope, or symptoms suggestive of TIA or stroke or lower extremity claudication.      She is a non-smoker.  Social alcohol use.  No drug use.  Mirena IUD for contraception.    Asks about establishing PCP at this clinic and routine screening labs.       OBJECTIVE:  Vitals:    12/15/23 0901 12/15/23 0902 12/15/23 0903 12/15/23 0904   BP: 135/83 130/89 (!) 127/91 131/88   Pulse: 79 79 79 79   Weight:       Height:         Healthy, alert, and in no distress.    Last Metabolic Panel:  Sodium   Date Value Ref Range Status   2022 138 133 - 144 mmol/L Final     Potassium   Date Value Ref Range Status   2022 3.9 3.4 - 5.3 mmol/L Final     Chloride   Date Value Ref Range Status   2022 107 94 - 109 mmol/L Final     Carbon Dioxide (CO2)   Date  Value Ref Range Status   12/16/2022 27 20 - 32 mmol/L Final     Anion Gap   Date Value Ref Range Status   12/16/2022 4 3 - 14 mmol/L Final     Glucose   Date Value Ref Range Status   12/16/2022 87 70 - 99 mg/dL Final     Urea Nitrogen   Date Value Ref Range Status   12/16/2022 13 7 - 30 mg/dL Final     Creatinine   Date Value Ref Range Status   12/16/2022 0.66 0.52 - 1.04 mg/dL Final     GFR Estimate   Date Value Ref Range Status   12/16/2022 >90 >60 mL/min/1.73m2 Final     Comment:     Effective December 21, 2021 eGFRcr in adults is calculated using the 2021 CKD-EPI creatinine equation which includes age and gender (Kimberlyn et al., NEJM, DOI: 10.1056/[id number])     Calcium   Date Value Ref Range Status   12/16/2022 8.9 8.5 - 10.1 mg/dL Final         ASSESSMENT/PLAN:  HTN, chronic, well controlled - Continue losartan 50 mg daily for BP control. Check BMP today to monitor renal function and electrolytes. Also check lipid panel for routine screening for high cholesterol. Worrisome signs and symptoms were discussed with Rosemarie and she was instructed to return to the clinic for concerning symptoms or to call with questions. RTC in 1 year for annual physical, sooner prn.      Vonda Chacon MD  Family Medicine

## 2023-12-15 ENCOUNTER — OFFICE VISIT (OUTPATIENT)
Dept: FAMILY MEDICINE | Facility: CLINIC | Age: 33
End: 2023-12-15
Attending: FAMILY MEDICINE
Payer: COMMERCIAL

## 2023-12-15 ENCOUNTER — LAB (OUTPATIENT)
Dept: LAB | Facility: CLINIC | Age: 33
End: 2023-12-15
Attending: FAMILY MEDICINE
Payer: COMMERCIAL

## 2023-12-15 VITALS
HEART RATE: 79 BPM | WEIGHT: 186.2 LBS | SYSTOLIC BLOOD PRESSURE: 131 MMHG | BODY MASS INDEX: 28.22 KG/M2 | DIASTOLIC BLOOD PRESSURE: 88 MMHG | HEIGHT: 68 IN

## 2023-12-15 DIAGNOSIS — Z13.220 SCREENING FOR CHOLESTEROL LEVEL: ICD-10-CM

## 2023-12-15 DIAGNOSIS — I10 CHRONIC HYPERTENSION: Primary | ICD-10-CM

## 2023-12-15 DIAGNOSIS — I10 CHRONIC HYPERTENSION: ICD-10-CM

## 2023-12-15 LAB
ANION GAP SERPL CALCULATED.3IONS-SCNC: 7 MMOL/L (ref 7–15)
BUN SERPL-MCNC: 12.7 MG/DL (ref 6–20)
CALCIUM SERPL-MCNC: 9.2 MG/DL (ref 8.6–10)
CHLORIDE SERPL-SCNC: 104 MMOL/L (ref 98–107)
CHOLEST SERPL-MCNC: 144 MG/DL
CREAT SERPL-MCNC: 0.69 MG/DL (ref 0.51–0.95)
DEPRECATED HCO3 PLAS-SCNC: 29 MMOL/L (ref 22–29)
EGFRCR SERPLBLD CKD-EPI 2021: >90 ML/MIN/1.73M2
FASTING STATUS PATIENT QL REPORTED: NO
GLUCOSE SERPL-MCNC: 81 MG/DL (ref 70–99)
HDLC SERPL-MCNC: 51 MG/DL
LDLC SERPL CALC-MCNC: 77 MG/DL
NONHDLC SERPL-MCNC: 93 MG/DL
POTASSIUM SERPL-SCNC: 4.2 MMOL/L (ref 3.4–5.3)
SODIUM SERPL-SCNC: 140 MMOL/L (ref 135–145)
TRIGL SERPL-MCNC: 79 MG/DL

## 2023-12-15 PROCEDURE — 80048 BASIC METABOLIC PNL TOTAL CA: CPT

## 2023-12-15 PROCEDURE — 80061 LIPID PANEL: CPT

## 2023-12-15 PROCEDURE — 36415 COLL VENOUS BLD VENIPUNCTURE: CPT

## 2023-12-15 PROCEDURE — 99213 OFFICE O/P EST LOW 20 MIN: CPT | Performed by: FAMILY MEDICINE

## 2023-12-15 PROCEDURE — 99214 OFFICE O/P EST MOD 30 MIN: CPT | Performed by: FAMILY MEDICINE

## 2023-12-15 RX ORDER — LOSARTAN POTASSIUM 50 MG/1
50 TABLET ORAL DAILY
Qty: 90 TABLET | Refills: 4 | Status: SHIPPED | OUTPATIENT
Start: 2023-12-15

## 2024-06-10 ENCOUNTER — OFFICE VISIT (OUTPATIENT)
Dept: URGENT CARE | Facility: URGENT CARE | Age: 34
End: 2024-06-10
Payer: COMMERCIAL

## 2024-06-10 VITALS
SYSTOLIC BLOOD PRESSURE: 139 MMHG | DIASTOLIC BLOOD PRESSURE: 100 MMHG | WEIGHT: 178.3 LBS | TEMPERATURE: 98 F | OXYGEN SATURATION: 98 % | BODY MASS INDEX: 27.5 KG/M2 | HEART RATE: 74 BPM

## 2024-06-10 DIAGNOSIS — J02.9 SORE THROAT: Primary | ICD-10-CM

## 2024-06-10 LAB — DEPRECATED S PYO AG THROAT QL EIA: NEGATIVE

## 2024-06-10 PROCEDURE — 99213 OFFICE O/P EST LOW 20 MIN: CPT | Performed by: PHYSICIAN ASSISTANT

## 2024-06-10 PROCEDURE — 87651 STREP A DNA AMP PROBE: CPT | Performed by: PHYSICIAN ASSISTANT

## 2024-06-11 DIAGNOSIS — J02.0 STREPTOCOCCAL SORE THROAT: Primary | ICD-10-CM

## 2024-06-11 LAB — GROUP A STREP BY PCR: DETECTED

## 2024-06-11 RX ORDER — AMOXICILLIN 875 MG
875 TABLET ORAL 2 TIMES DAILY
Qty: 20 TABLET | Refills: 0 | Status: SHIPPED | OUTPATIENT
Start: 2024-06-11 | End: 2024-06-21

## 2024-06-11 NOTE — PROGRESS NOTES
SUBJECTIVE:  Rosemarie Johnson is a 34 year old female who comes in with concerns for possible strep throat.  Patient states that last night she began with a sore throat.  History of strep and strep going around .  Has been using some over-the-counter tea for symptomatic relief.  Denies any headache, fevers, GI symptoms or rashes.  No cough or cold symptoms.  Patient otherwise in normal state of good health.  Still been able to eat and drink.    Past Medical History:   Diagnosis Date    Anemia of mother during pregnancy, delivered 3/28/2020    Formatting of this note might be different from the original. Pitx3, cytotec, hemabate, txax2 Hb 10.1 (from 13.6) Hemorrhage 1760 total     delivery delivered 3/27/2020    Last Assessment & Plan:  Formatting of this note might be different from the original. Rosemarie Johnson is a 30 year old  who was admitted at 30w1d with pregnancy complicated by chronic hypertension, vasa previa, single umbilical artery, and IUGR, 2%ile. She was admitted on 3/27 from a scheduled ultrasound for prolonged monitoring due to decelerations in the heart rate and intermitted reversed do    Chronic hypertension     Delayed or secondary postpartum hemorrhage 3/28/2020    Formatting of this note might be different from the original. 1003cc after expressed clots. cytotec 800, oxytocinx3    High-risk pregnancy, unspecified trimester 2022    History of classical  section 2022    History of placenta accreta in prior pregnancy, currently pregnant 2022    Per patient: was told focal accreta was noted on placenta pathology report.     History of prior pregnancy with IUGR  2022    PCOS (polycystic ovarian syndrome)     dx age 12    Placenta accreta 2020    Formatting of this note might be different from the original. Pathology after delivery shows focal placenta accreta.  Patient currently asymptomatic    S/P  section 2022    Vasa previa  1/30/2020     Patient Active Problem List   Diagnosis    Chronic hypertension     Current Outpatient Medications   Medication Sig Dispense Refill    losartan (COZAAR) 50 MG tablet Take 1 tablet (50 mg) by mouth daily 90 tablet 4     No current facility-administered medications for this visit.     Social History     Socioeconomic History    Marital status:      Spouse name: Shun    Number of children: 1    Years of education: Not on file    Highest education level: Not on file   Occupational History    Occupation: [employer]-full time   Tobacco Use    Smoking status: Never    Smokeless tobacco: Never   Vaping Use    Vaping status: Never Used   Substance and Sexual Activity    Alcohol use: Not Currently    Drug use: Never    Sexual activity: Yes     Partners: Male     Birth control/protection: None   Other Topics Concern    Not on file   Social History Narrative    Not on file     Social Determinants of Health     Financial Resource Strain: Low Risk  (12/14/2023)    Financial Resource Strain     Within the past 12 months, have you or your family members you live with been unable to get utilities (heat, electricity) when it was really needed?: No   Food Insecurity: Low Risk  (12/14/2023)    Food Insecurity     Within the past 12 months, did you worry that your food would run out before you got money to buy more?: No     Within the past 12 months, did the food you bought just not last and you didn t have money to get more?: No   Transportation Needs: Low Risk  (12/14/2023)    Transportation Needs     Within the past 12 months, has lack of transportation kept you from medical appointments, getting your medicines, non-medical meetings or appointments, work, or from getting things that you need?: No   Physical Activity: Sufficiently Active (8/16/2021)    [facility]    Exercise Vital Sign     Days of Exercise per Week: 6 days     Minutes of Exercise per Session: 30 min   Stress: No Stress  Concern Present (8/16/2021)    [facility]    [employer] - Occupational Stress Questionnaire     Feeling of Stress : Only a little   Social Connections: Unknown (8/16/2021)    [facility] from [facility], [facility]    Social Connection and Isolation Panel [NHANES]     Frequency of Communication with Friends and Family: More than three times a week     Frequency of Social Gatherings with Friends and Family: Once a week     Attends Druze Services: Patient declined     Active Member of Clubs or Organizations: Yes     Attends Club or Organization Meetings: 1 to 4 times per year     Marital Status:    Interpersonal Safety: Not At Risk (8/16/2021)    [facility], [facility]    Humiliation, Afraid, Rape, and Kick questionnaire     Fear of Current or Ex-Partner: No     Emotionally Abused: No     Physically Abused: No     Sexually Abused: No   Housing Stability: Low Risk  (12/14/2023)    Housing Stability     Do you have housing? : Yes     Are you worried about losing your housing?: No     ROS  negative other than stated above    Exam:  GENERAL APPEARANCE: healthy, alert and no distress  EYES: EOMI,  PERRL  HENT: ear canals and TM's normal and nose and mouth without ulcers or lesions  NECK: no adenopathy, no asymmetry, masses, or scars and thyroid normal to palpation  RESP: lungs clear to auscultation - no rales, rhonchi or wheezes  CV: regular rates and rhythm, normal S1 S2, no S3 or S4 and no murmur, click or rub -  SKIN: no suspicious lesions or rashes    RST  negative        assessment/plan:  (J02.9) Sore throat  (primary encounter diagnosis)  Comment:   Plan: Streptococcus A Rapid Screen w/Reflex to PCR,         Group A Streptococcus PCR Throat Swab        Patient with 1 day history of sore throat.  Strep was negative and culture is pending.  Advised to continue with over-the-counter med for symptom relief.  Red flag signs were discussed  will follow-up with primary symptoms worsen or new symptoms develop

## 2024-06-23 ENCOUNTER — HEALTH MAINTENANCE LETTER (OUTPATIENT)
Age: 34
End: 2024-06-23

## 2024-10-19 ENCOUNTER — IMMUNIZATION (OUTPATIENT)
Dept: FAMILY MEDICINE | Facility: CLINIC | Age: 34
End: 2024-10-19
Payer: COMMERCIAL

## 2024-10-19 PROCEDURE — 90480 ADMN SARSCOV2 VAC 1/ONLY CMP: CPT

## 2024-10-19 PROCEDURE — 90471 IMMUNIZATION ADMIN: CPT

## 2024-10-19 PROCEDURE — 90656 IIV3 VACC NO PRSV 0.5 ML IM: CPT

## 2024-10-19 PROCEDURE — 91320 SARSCV2 VAC 30MCG TRS-SUC IM: CPT

## 2024-12-06 ENCOUNTER — LAB (OUTPATIENT)
Dept: LAB | Facility: CLINIC | Age: 34
End: 2024-12-06
Attending: FAMILY MEDICINE
Payer: COMMERCIAL

## 2024-12-06 DIAGNOSIS — I10 CHRONIC HYPERTENSION: ICD-10-CM

## 2024-12-06 LAB
ANION GAP SERPL CALCULATED.3IONS-SCNC: 11 MMOL/L (ref 7–15)
BUN SERPL-MCNC: 17 MG/DL (ref 6–20)
CALCIUM SERPL-MCNC: 9.3 MG/DL (ref 8.8–10.4)
CHLORIDE SERPL-SCNC: 104 MMOL/L (ref 98–107)
CREAT SERPL-MCNC: 0.69 MG/DL (ref 0.51–0.95)
EGFRCR SERPLBLD CKD-EPI 2021: >90 ML/MIN/1.73M2
GLUCOSE SERPL-MCNC: 93 MG/DL (ref 70–99)
HCO3 SERPL-SCNC: 24 MMOL/L (ref 22–29)
POTASSIUM SERPL-SCNC: 4 MMOL/L (ref 3.4–5.3)
SODIUM SERPL-SCNC: 139 MMOL/L (ref 135–145)

## 2024-12-06 PROCEDURE — 82310 ASSAY OF CALCIUM: CPT

## 2024-12-06 PROCEDURE — 80048 BASIC METABOLIC PNL TOTAL CA: CPT

## 2024-12-06 PROCEDURE — 36415 COLL VENOUS BLD VENIPUNCTURE: CPT

## 2024-12-09 ENCOUNTER — MYC MEDICAL ADVICE (OUTPATIENT)
Dept: FAMILY MEDICINE | Facility: CLINIC | Age: 34
End: 2024-12-09
Payer: COMMERCIAL

## 2024-12-09 DIAGNOSIS — A59.01 TRICHOMONAS VAGINALIS (TV) INFECTION: Primary | ICD-10-CM

## 2024-12-09 RX ORDER — METRONIDAZOLE 500 MG/1
500 TABLET ORAL 2 TIMES DAILY
Qty: 14 TABLET | Refills: 0 | Status: SHIPPED | OUTPATIENT
Start: 2024-12-09 | End: 2024-12-16

## 2024-12-10 NOTE — TELEPHONE ENCOUNTER
Please send EPT for trichomonas for her  metronidazole 2 g x1 dose if Rosemarie confirms no drug allergy and place future orders for vaginal PCR re-testing in 1-3 months.     Thank you!  Vonda

## 2024-12-11 NOTE — TELEPHONE ENCOUNTER
Patient's  Shun called.  Patient's  is still awaiting the Rx.   Please send Rx as soon as you can.   Thanks!  Hanna GUTIERREZ

## 2024-12-11 NOTE — TELEPHONE ENCOUNTER
Placed order for EPT flagyl. Called patient to let her know that the RX is being faxed to pharmacy.     Also relayed to patient that she needs to come back in 1-3 months for repeat PCR vaginal panel testing. Order placed per provider request.       Patient appreciative. No other questions.

## 2025-03-03 ENCOUNTER — MYC MEDICAL ADVICE (OUTPATIENT)
Dept: FAMILY MEDICINE | Facility: CLINIC | Age: 35
End: 2025-03-03
Payer: COMMERCIAL

## 2025-03-05 NOTE — TELEPHONE ENCOUNTER
Patient requesting labs for vaginal discharge.     She was last seen in clinic 12/2024 with Dr. Chacon. Trichomonas treated for both patient and partner. Patient was to return in 1-3 months for repeat PCR vaginal panel testing. Order still standing.     [email or web address] message sent to patient informing of order and advised to call scheduling line to make an appointment for nurse-only visit.

## 2025-04-21 ENCOUNTER — MYC MEDICAL ADVICE (OUTPATIENT)
Dept: FAMILY MEDICINE | Facility: CLINIC | Age: 35
End: 2025-04-21
Payer: COMMERCIAL

## (undated) DEVICE — CATH TRAY FOLEY 16FR BARDEX W/DRAIN BAG STATLOCK 300316A

## (undated) DEVICE — STRAP KNEE/BODY 31143004

## (undated) DEVICE — SOL WATER IRRIG 1000ML BOTTLE 07139-09

## (undated) DEVICE — GLOVE ESTEEM POWDER FREE SMT 6.5  2D72PT65

## (undated) DEVICE — SOL NACL 0.9% IRRIG 1000ML BOTTLE 07138-09

## (undated) DEVICE — PACK C-SECTION LF PL15OTA83B

## (undated) DEVICE — GLOVE PROTEXIS BLUE W/NEU-THERA 7.0  2D73EB70

## (undated) DEVICE — DRSG STERI STRIP 1/4X3" R1541

## (undated) DEVICE — STOCKING SLEEVE COMPRESSION CALF LG

## (undated) DEVICE — ESU GROUND PAD UNIVERSAL W/O CORD

## (undated) DEVICE — SOL ADH LIQUID BENZOIN SWAB 0.6ML C1544

## (undated) DEVICE — SU VICRYL 3-0 CTX 36" UND J980H

## (undated) DEVICE — PREP CHLORAPREP 26ML TINTED ORANGE  260815

## (undated) DEVICE — SU VICRYL 0 CT-1 36" J346H

## (undated) DEVICE — SU MONOCRYL 4-0 PS-2 18" UND Y496G

## (undated) DEVICE — DRSG ABDOMINAL 07 1/2X8" 7197D

## (undated) RX ORDER — FENTANYL CITRATE 50 UG/ML
INJECTION, SOLUTION INTRAMUSCULAR; INTRAVENOUS
Status: DISPENSED
Start: 2022-08-27

## (undated) RX ORDER — MORPHINE SULFATE 2 MG/ML
INJECTION, SOLUTION INTRAMUSCULAR; INTRAVENOUS
Status: DISPENSED
Start: 2022-08-27

## (undated) RX ORDER — ONDANSETRON 2 MG/ML
INJECTION INTRAMUSCULAR; INTRAVENOUS
Status: DISPENSED
Start: 2022-08-27

## (undated) RX ORDER — LABETALOL HYDROCHLORIDE 5 MG/ML
INJECTION, SOLUTION INTRAVENOUS
Status: DISPENSED
Start: 2022-08-27

## (undated) RX ORDER — HYDRALAZINE HYDROCHLORIDE 20 MG/ML
INJECTION INTRAMUSCULAR; INTRAVENOUS
Status: DISPENSED
Start: 2022-08-27